# Patient Record
Sex: FEMALE | Race: WHITE
[De-identification: names, ages, dates, MRNs, and addresses within clinical notes are randomized per-mention and may not be internally consistent; named-entity substitution may affect disease eponyms.]

---

## 2023-11-07 ENCOUNTER — HOSPITAL ENCOUNTER
Dept: HOSPITAL 101 - LAB | Age: 63
Discharge: HOME | End: 2023-11-07
Payer: MEDICARE

## 2023-11-07 DIAGNOSIS — E03.9: Primary | ICD-10-CM

## 2023-11-07 DIAGNOSIS — I50.22: ICD-10-CM

## 2023-11-07 DIAGNOSIS — I11.0: ICD-10-CM

## 2023-11-07 DIAGNOSIS — R53.82: ICD-10-CM

## 2023-11-07 DIAGNOSIS — Z51.81: ICD-10-CM

## 2023-11-07 LAB
ADD MANUAL DIFF: NO
ALANINE AMINOTRANSFERASE: 17 U/L (ref 14–59)
ALBUMIN GLOBULIN RATIO: 0.9
ALBUMIN LEVEL: 3.4 G/DL (ref 3.4–5)
ALKALINE PHOSPHATASE: 100 U/L (ref 46–116)
ANION GAP: 11.7
ASPARTATE AMINO TRANSFERASE: 53 U/L (ref 15–37)
BLOOD UREA NITROGEN: 10 MG/DL (ref 7–18)
CALCIUM: 9.2 MG/DL (ref 8.5–10.1)
CARBON DIOXIDE: 32.5 MMOL/L (ref 21–32)
CHLORIDE: 98 MMOL/L (ref 98–107)
CO2 BLD-SCNC: 32.5 MMOL/L (ref 21–32)
ESTIMATED GFR (AFRICAN AMERICA: >60 (ref 60–?)
ESTIMATED GFR (NON-AFRICAN AME: >60 (ref 60–?)
GLOBULIN: 4 G/DL
GLUCOSE BLD-MCNC: 80 MG/DL (ref 74–106)
HCT VFR BLD CALC: 39.9 % (ref 36–48)
HEMATOCRIT: 39.9 % (ref 36–48)
HEMOGLOBIN: 12.9 G/DL (ref 12–16)
IMMATURE GRANULOCYTES ABS AUTO: 0.01 10^3/UL (ref 0–0.03)
IMMATURE GRANULOCYTES PCT AUTO: 0.2 % (ref 0–0.5)
LYMPHOCYTES  ABSOLUTE AUTO: 1.9 10^3/UL (ref 1.2–3.8)
MCV RBC: 92.8 FL (ref 81–99)
MEAN CORPUSCULAR HEMOGLOBIN: 30 PG (ref 26.7–34)
MEAN CORPUSCULAR HGB CONC: 32.3 G/DL (ref 29.9–35.2)
MEAN CORPUSCULAR VOLUME: 92.8 FL (ref 81–99)
PLATELET # BLD: 263 10^3/UL (ref 150–450)
PLATELET COUNT: 263 10^3/UL (ref 150–450)
POTASSIUM SERPLBLD-SCNC: 3.2 MMOL/L (ref 3.5–5.1)
POTASSIUM: 3.2 MMOL/L (ref 3.5–5.1)
RED BLOOD COUNT: 4.3 10^6/UL (ref 4.2–5.4)
SODIUM BLD-SCNC: 139 MMOL/L (ref 136–145)
SODIUM: 139 MMOL/L (ref 136–145)
TOTAL PROTEIN: 7.4 G/DL (ref 6.4–8.2)
VITAMIN B12: 294 PG/ML (ref 193–986)
WBC # BLD: 5.5 10^3/UL (ref 4–11)
WHITE BLOOD COUNT: 5.5 10^3/UL (ref 4–11)

## 2023-11-07 PROCEDURE — 82607 VITAMIN B-12: CPT

## 2023-11-07 PROCEDURE — 36415 COLL VENOUS BLD VENIPUNCTURE: CPT

## 2023-11-07 PROCEDURE — 85025 COMPLETE CBC W/AUTO DIFF WBC: CPT

## 2023-11-07 PROCEDURE — 80053 COMPREHEN METABOLIC PANEL: CPT

## 2023-11-07 PROCEDURE — 82306 VITAMIN D 25 HYDROXY: CPT

## 2023-11-07 PROCEDURE — 84439 ASSAY OF FREE THYROXINE: CPT

## 2024-02-07 ENCOUNTER — HOSPITAL ENCOUNTER
Dept: HOSPITAL 101 - LAB | Age: 64
Discharge: HOME | End: 2024-02-07
Payer: MEDICARE

## 2024-02-07 DIAGNOSIS — I48.11: Primary | ICD-10-CM

## 2024-02-07 LAB — MAGNESIUM: 1.7 MG/DL (ref 1.8–2.4)

## 2024-02-07 PROCEDURE — 36415 COLL VENOUS BLD VENIPUNCTURE: CPT

## 2024-02-07 PROCEDURE — 83735 ASSAY OF MAGNESIUM: CPT

## 2024-02-07 NOTE — XMS_ITS
Comprehensive CCD (C-CDA v2.1)  
  
                          Created on: 2024  
  
  
Giacomo Benavides  
External Reference #: CDR,PersonID:[id number]  
: 1960  
Sex: Female  
  
Demographics  
  
  
                                        Address             [address] [place]  [address]  
   
                                        Home Phone          [phone]  
   
                                        Home Phone          [phone]  
   
                                        Home Phone          [phone]  
   
                                        Home Phone          [phone]  
   
                                        Work Phone          [phone]  
   
                                        Preferred Language  en  
   
                                        Marital Status        
   
                                        Mandaen Affiliation Unknown  
   
                                        Race                White  
   
                                        Ethnic Group        Not  or Lati  
no  
  
  
Author  
  
  
                                        Name                Unknown  
   
                                        Address             [address]  
[address]  
[place]  [address]  
   
                                        Phone               [phone]  
   
                                        Organization        [facility]  
  
  
Care Team Providers  
  
  
                                Care Team Member Name Role            Phone  
   
                                Cesario Piedra Unavailable     [phone]  
   
                                Unavailable     Unavailable     [phone]  
   
                                DorotheaJe bettencourtew Unavailable     [phone]  
   
                                Dorothea, Cesario Unavailable     [phone]  
   
                                JESSE Wallace Attending Provider [phone]  
   
                                NO FAMILY, PHYSICIAN Primary Care Provider Unava  
MD Saeed Etienne Attending Provider [phone]  
0  
   
                                Pierce Wallace  Unavailable     [phone]  
   
                                DERREKC, DR DONALDSON Admitting       Unavailable  
   
                                DOROTHEA, CESARIO Primary Care    Unavailable  
   
                                MISC, DR DONALDSON Attending       Unavailable  
   
                                MISC, DR DONALDSON Consulting      Unavailable  
   
                                DOROTHEA, CESARIO Admitting       Unavailable  
   
                                DOROTHEA, CESARIO Primary Care    Unavailable  
   
                                DOROTHEA, CESARIO Attending       Unavailable  
   
                                DOROTHEA, CESARIO Consulting      Unavailable  
   
                                JEMAL, DR NASSAR Admitting       Unavailable  
   
                                AGUSTIN, DR ROGELIO VEE Consulting      Unavailable  
   
                                DOROTHEA, CESARIO Primary Care    Unavailable  
   
                                JEMAL, DR NASSAR Attending       Unavailable  
   
                                SALVADOR HUANG        Consulting      Unavailable  
   
                                JEMAL, DR NASSAR Consulting      Unavailable  
   
                                SHAIKH BEE H Admitting       Unavailable  
   
                                DOROTHEA, CESARIO Primary Care    Unavailable  
   
                                SHAIKH BEE H Attending       Unavailable  
   
                                DOROTHEA, CESARIO Attending       Unavailable  
   
                                DOROTHEA, CESARIO Consulting      Unavailable  
   
                                DOROTHEA, CESARIO Admitting       Unavailable  
   
                                DOROTHEA, CESARIO Attending       Unavailable  
   
                                DOROTHEA, CESARIO Consulting      Unavailable  
   
                                DOROTHEA, CESARIO Primary Care    Unavailable  
   
                                DOROTHEA, CESARIO Admitting       Unavailable  
   
                                NO FAMILY, PHYSICIAN Primary Care Provider Unava  
MD Laverne Watsonbuck Attending Provider [phone]  
   
                                DO Cesario Piedra Primary Care Provider [phone]  
   
                                DO Cesario Piedra Attending Provider [phone]  
   
                                NO FAMILY, PHYSICIAN Primary Care    Unavailable  
   
                                Cesario Joaquin Admitting       Unavailable  
   
                                JoaquinCesario hoover Attending       Unavailable  
   
                                Cesario Piedra KATHY Admitting       Unavailable  
   
                                DorotheaCesario bettencourt KATHY Primary Care    Unavailable  
   
                                Dorothea Cesario CHAVEZ Attending       Unavailable  
   
                                NO FAMILY, PHYSICIAN Primary Care    Unavailable  
   
                                Redd So Admitting       Unavailab  
le  
   
                                Redd So Attending       Unavailab  
le  
  
  
  
Allergies  
  
  
                                                    Allergy   
Classification                          Reported   
Allergen(s)                             Allergy   
Type                                    Date of   
Onset                     Reaction(s)               Facility  
   
                                                      
(20 sources)                            Prochlorperazine;   
Translations:   
[Compazine]                             Drug   
Allergy                                 neurological sx     [facility]   
[facility]   
[facility]  
Other Phone:   
[phone]  
   
                                                      
(1 source)                Prochlorperazine          Drug   
Allergy                                 20  
13                                                  [facility]   
Repository  
   
                                                      
(1 source)                Prochlorperazine          Drug   
Allergy                                 06-10-20  
20                                                  [facility]   
Repository  
  
  
  
Medications  
Current Medications  
  
  
  
                      Medication Drug Class(es) Dates      Sig (Normalized) Sig   
(Original)  
   
                                                    [id number]   
actuat albuterol   
0.09 mg/actuat   
metered dose   
inhaler  
(20 sources)                            beta2-Adrenergic   
Agonist                                 Start:   
05-                              take 1 puff(s) by   
inhalation every   
four hours as needed                    Albuterol Sulfate   
 (90 Base)   
MCG/ACT 1 puff as   
needed Inhalation   
every 4 hrs for   
30 day(s) 10 May,   
2021 Active  
  
  
  
                                        Start: 05-   take 1 puff(s) by in  
halation   
every four hours as needed              Albuterol Sulfate  (90   
Base) MCG/ACT 1 puff as needed   
Inhalation every 4 hrs for 30   
day(s) 10 May, 2021 Active  
   
                                Start: 05-                   
   
                                Start: 2019                 Albuterol Sulf  
ate (2.5 MG/3ML)   
0.083% Inhalation Nebulization   
Solution USE AS DIRECTED.   
Quantity: 0 Refills: 0 Ordered:   
4-Dec-2019 DO Start : 4-Dec-2019   
Active  
   
                                                    Start: 2019  
End: 2022                         take 2.5 mg by inhalation four   
times daily                             Albuterol Sulfate Discontinued 2.5   
MG INHALATION Four times daily -   
Respiratory 360    
12:00am 2022 4:48am  
  
  
  
                                                    Albuterol Sulfate (2.5 MG/ 3  
 ML) 2.5   
MG/3ML 0.083% Nebulization Solution  
(9 sources)                                                     Albuterol Sulfat  
e (2.5 MG/ 3 ML) 2.5   
MG/3ML 0.083% Nebulization Solution 3ml   
Inhalation 4 times a day Active  
  
  
  
                                                                  
  
  
  
                                                    apixaban 5 mg oral   
tablet  
(8 sources)         Factor Xa Inhibitor                     take 1 tablet by   
mouth every   
twelve hours                            Eliquis 5 MG 1   
tablet Orally   
Twice a day   
Active  
   
                                                    aspirin 81 mg   
delayed release   
oral tablet  
(20 sources)                            Platelet Aggregation   
Inhibitor, Nonsteroidal   
Anti-inflammatory Drug                  Start:   
2022                              take 81 mg by   
mouth once daily                        Aspirin Active 81   
MG PO Daily 90    
12:00am  
  
  
  
                                                    Start: 2019  
End: 2020           take 81 mg by mouth once daily Aspirin Discontinued 81  
 MG PO   
Daily   
2019 1:00am 2020 11:58am  
   
                                                            take 1 tablet by kathrin  
th once   
daily                                   Aspirin 81 MG 1 tablet Orally Once a   
day Active  
  
  
  
                                                    atorvastatin 80 mg   
oral tablet  
(20 sources)                            HMG-CoA   
Reductase   
Inhibitor                 Start: 2021         take 1 tablet   
by mouth once   
daily                                   Atorvastatin   
(Lipitor) 80 mg   
Tablet Active 80 MG   
PO Daily 2022 12:00am  
  
  
  
                                                    Start: 2019  
End: 2020                         take 40 mg by mouth once daily   
at bedtime                              Atorvastatin Discontinued 40 MG PO   
Daily at bedtime  1:00am 2020   
3:48pm  
   
                                                    Start: 2019  
End: 06-           take 10 mg by mouth once daily Atorvastatin Discontinu  
ed 10 MG   
PO   
Daily 2019 1:00am   
2019 5:45pm  
  
  
  
                                                    carvedilol 3.125 mg   
oral tablet  
(11 sources)                            alpha-Adrenergic   
Blocker,   
beta-Adrenergic   
Blocker                                 Start:   
2022                              take 3.125 mg   
by mouth   
twice daily   
at mealtime                             Carvedilol   
Active 3.125 MG   
PO Twice daily   
with meals 60   
 12:00am  
   
                                                    empagliflozin 10 mg   
oral tablet  
(11 sources)                            Sodium-Glucose   
Cotransporter 2   
Inhibitor                               Start:   
2022                              take 1 tablet   
by mouth   
every   
twenty-four   
hours                                   Jardiance 10 MG   
1 tablet Orally   
Once a day for   
90 day(s)    
Active  
   
                                                    furosemide 40 mg   
oral tablet  
(20 sources)              Loop Diuretic             Start:   
2022                              take 40 mg by   
mouth once   
daily                                   Furosemide   
Active 40 MG PO   
Daily at 0800   
30    
12:00am  
  
  
  
                                        Start: 2020   take 0.5 tablet by m  
outh once   
daily                                   Furosemide 20 MG 1/2 tablet Orally   
daily for 90 days    
Active  
   
                                        Start: 2020   take 1 tablet by kathrin  
th every   
twenty-four hours                       Furosemide 20 MG 1 tablet Orally   
daily for 90 days    
Active  
   
                                                    Start: 2020  
End: 2022           take 40 mg by mouth once daily Furosemide Discontinued  
 40 MG PO   
Daily 2020 12:01pm 2022 4:48am  
  
  
  
                                                    levothyroxine sodium   
0.088 mg oral tablet  
(20 sources)        l-Thyroxine         Start: 2019   take 1 tablet   
by mouth once   
daily                                   Levothyroxine   
(Synthroid) 88 mcg   
Tablet Active 88 MCG   
PO DAILY@0630 30    
12:00am  
  
  
  
                                                    Start: 2019  
End: 2019                         take 100 ug by mouth once   
daily                                   Levothyroxine Discontinued 100 MCG PO   
Daily 2019 1:00am 2019 2:12pm  
   
                                                                Levothyroxine So  
dium 88 MCG TAKE 1   
TABLET BY MOUTH EVERY DAY FOR 90 DAYS   
for 90 Active  
  
  
  
                                                    magnesium oxide 400 mg   
oral tablet  
(14 sources)                            Start: 2022   take 400 mg by   
mouth twice daily                       Magnesium Oxide Active   
400 MG PO Twice daily   
60    
12:00am  
  
  
  
                                                                Magnesium Oxide   
Active  
  
  
  
                                                    nitrofurantoin,   
macrocrystals 25 mg /   
nitrofurantoin,   
monohydrate 75 mg   
oral capsule  
(2 sources)                             Nitrofuran   
Antibacterial                           Start:   
2022                              take 1   
capsule by   
mouth every   
twelve hours                            Macrobid 100 MG 1   
capsule with food   
Orally every 12   
hrs for 7 day(s)   
28 Dec, 2022   
Active  
   
                                                    ondansetron 8 mg oral   
tablet  
(1 source)                              Serotonin-3   
Receptor Antagonist                                 take 1   
tablet by   
mouth every   
twenty-four   
hours                                   Ondansetron HCl 8   
MG 1 tablet as   
needed Orally   
Once a day for 30   
days Active  
   
                                                    pantoprazole 40 mg   
delayed release oral   
tablet  
(20 sources)                            Proton Pump   
Inhibitor                               Start:   
2019                              take 40 mg   
by mouth   
once daily                              Pantoprazole   
Active 40 MG PO   
Daily 2020 12:00am  
   
                                                    sacubitril 24 mg /   
valsartan 26 mg oral   
tablet  
(20 sources)                            Angiotensin 2   
Receptor Blocker                        Start:   
2022                              take 2   
tablets by   
mouth twice   
daily                                   Sacubitril-Valsar  
dorado (Entresto)   
24-26 mg Tablet   
Active 2 TAB PO   
Twice daily 120   
30 2022 12:00am  
  
  
  
                                                                ENTRESTO 24 mg/2  
6 mg 1 orally twice a day Active  
  
  
  
Completed/Discontinued Medications  
  
  
  
                      Medication Drug Class(es) Dates      Sig (Normalized) Sig   
(Original)  
   
                                                    alendronic acid 70   
mg oral tablet  
(5 sources)               Bisphosphonate            Start:   
2019  
End:   
2020                              take 70 mg by mouth   
every week                              Alendronate   
Discontinued 70 MG   
PO every week   
2019   
1:00am 2020 12:57pm  
   
                                                    ALPRAZolam 1 mg   
oral tablet  
(20 sources)              Benzodiazepine            Start:   
2019  
End:   
2020                              take 1 mg by mouth   
three times daily                       Alprazolam   
Discontinued 1 MG   
PO Three times   
daily 0 2019 2:09pm 2020 4:18pm  
  
  
  
                                                            take 1 tablet by kathrin  
th every twelve   
hours                                   ALPRAZolam 1 MG 1 tablet Orally Twice a   
day   
Active  
   
                                                            take 1 tablet by kathrin  
th three times daily   
as needed                               ALPRAZolam ER 1 MG Oral Tablet Extended   
Release 24 Hour 1 tablet three times daily   
as needed Quantity: 0 Refills: 0 Ordered:   
10-Feb-2022 DO Active  
  
  
  
                                                    amiodarone   
hydrochloride 200 mg   
oral tablet  
(14 sources)        Antiarrhythmic      Start: 2022   take 1 tablet   
by mouth once   
daily                                   Amiodarone HCl -   
200 MG Oral Tablet   
TAKE 1 TABLET   
DAILY. Quantity:   
90 Refills: 1   
Ordered:   
2022   
Tomi Rodgers MD   
Start :   
2022 Active   
new start  
  
  
  
                                                    Start: 03-  
End: 2020                         take 200 mg by mouth once   
daily                                   Amiodarone Discontinued 200 MG PO   
Daily 2020 12:00am 2020 3:48pm  
  
  
  
                                                    atenolol 25 mg oral   
tablet  
(2 sources)                             beta-Adrenergic   
Blocker                                 Start:   
2019  
End: 2019                         take 25 mg   
by mouth   
once daily                              Atenolol   
Discontinued 25 MG   
PO Daily 2019 1:00am   
2019 10:02am  
   
                                                    azithromycin 250 mg   
oral tablet  
(2 sources)                             Macrolide   
Antimicrobial                           Start:   
2019  
End: 03-                         take 250 mg   
by mouth   
every   
twenty-four   
hours                                   Azithromycin   
Discontinued 250   
MG PO Q24H 3 3   
2019   
12:00am 2020 4:04pm  
   
                                                    busPIRone   
hydrochloride 15 mg   
oral tablet  
(12 sources)                                        Start:   
03-  
End: 2022                         take 30 mg   
by mouth   
twice daily                             Buspirone   
Discontinued 30 MG   
PO Twice daily   
2020   
4:03pm 2022 2:17pm  
  
  
  
                                                    Start: 2019  
End: 03-                         take 15 mg by mouth twice   
daily                                   Buspirone Discontinued 15 MG PO Twice   
daily 60 2019 12:00am   
2020 4:04pm  
  
  
  
                                                    clopidogrel 75 mg   
oral tablet  
(12 sources)                            P2Y12 Platelet   
Inhibitor                               Start: 2019  
End: 2022                         take 75 mg by   
mouth once   
daily                                   Clopidogrel   
Discontinued 75 MG   
PO Daily  12:00am   
2022   
5:08am  
   
                                                    24 hr dilTIAZem   
hydrochloride 180 mg   
extended release   
oral capsule  
(5 sources)                             Calcium Channel   
Blocker                   Start: 2019         take 1 capsule   
by mouth once   
daily in the   
morning                                 dilTIAZem HCl ER   
Beads 180 MG Oral   
Capsule Extended   
Release 24 Hour   
TAKE 1 CAPSULE   
DAILY IN THE   
MORNING. Quantity:   
0 Refills: 0   
Ordered:   
4-Dec-2019 DO   
Start : 4-Dec-2019   
Active  
  
  
  
                                                    Start: 2019  
End: 03-                         take 120 mg by mouth once   
daily                                   Diltiazem Hcl Discontinued 120 MG PO   
Daily    
1:00am 2020 4:04pm  
  
  
  
                                                    ergocalciferol 1.25   
mg oral capsule  
(6 sources)                             Provitamin D2   
Compound                                Start:   
2019                              take 1   
capsule by   
mouth every   
week                                    Ergocalciferol 1.25   
MG (85589 UT) Oral   
Capsule TAKE 1   
CAPSULE WEEKLY.   
Quantity: 0   
Refills: 0 Ordered:   
4-Dec-2019 DO Start   
: 4-Dec-2019 Active  
   
                                                    ferrous sulfate 325   
mg oral tablet  
(2 sources)                                         Start:   
2020  
End: 2022                         take 325 mg   
by mouth   
once daily                              Ferrous Sulfate   
Discontinued 325 MG   
PO Daily 2020 12:00am   
2022   
5:08am  
   
                                                    folic acid 1 mg oral   
tablet  
(9 sources)                                         Start:   
2019  
End: 03-                         take 1 mg by   
mouth once   
daily                                   Folic Acid   
Discontinued 1 MG   
PO Daily 2019 1:00am   
2020   
4:04pm  
   
                                                    hydrOXYzine pamoate   
50 mg oral capsule  
(2 sources)               Antihistamine             Start:   
2019  
End: 03-                         take 50 mg   
by mouth   
three times   
daily                                   Hydroxyzine Pamoate   
Discontinued 50 MG   
PO Three times   
daily    
1:00am 2020 4:04pm  
   
                                                    lamoTRIgine 150 mg   
oral tablet  
(13 sources)                            Mood Stabilizer,   
Anti-epileptic   
Agent                                   Start:   
2022  
End: 2022                         take 1   
tablet by   
mouth once   
daily                                   Lamotrigine   
(Lamictal) 150 mg   
Tablet Discontinued   
150 MG PO Daily   
2022   
12:00am 2022 2:17pm  
  
  
  
                                                    Start: 2020  
End: 2020                         take 25 mg by mouth once daily   
at bedtime                              Lamotrigine Discontinued 25 MG PO   
Daily at bedtime  12:00am 2020 12:01pm  
   
                                                    Start: 2020  
End: 2020                         take 150 mg by mouth once daily   
in the morning                          Lamotrigine Discontinued 150 MG PO   
Every morning 2020   
12:00am 2020 4:21pm  
   
                                                    Start: 2020  
End: 2020                         take 75 mg by mouth once daily   
at bedtime                              Lamotrigine Discontinued 75 MG PO   
Daily at bedtime 2020   
12:00am 2020 4:32pm  
   
                                                            take 1 tablet by kathrin  
th once   
daily                                   LaMICtal XR 50 MG Oral Tablet   
Extended Release 24 Hour Take 1   
tablet daily Quantity: 0 Refills: 0   
Ordered: 10-Feb-2022 DO Active  
  
  
  
                                                    lisinopril 5 mg   
oral tablet  
(16 sources)                            Angiotensin   
Converting Enzyme   
Inhibitor                 Start: 02-         take 1 tablet   
by mouth once   
daily                                   Lisinopril 5 MG   
Oral Tablet TAKE 1   
TABLET DAILY.   
Quantity: 90   
Refills: 3   
Ordered:   
10-Feb-2022   
Tomi Rodgers MD   
Start :   
10-Feb-2022 Active   
dose increased  
  
  
  
                                        Start: 2021   take 1 tablet by kathrin  
th once   
daily                                   Lisinopril 2.5 MG Oral Tablet TAKE 1   
TABLET BY MOUTH EVERY DAY Quantity:   
90 Refills: 3 Ordered: 30-Dec-2021   
Tomi Rodgers MD Start :   
30-Dec-2021 Active  
   
                                Start: 2020 take 5 mg by mouth once daily   
Lisinopril Active 5 MG PO Daily   
2020 12:00am  
   
                                        Start: 2019   take 1 tablet by kathrin  
th once   
daily                                   Lisinopril 5 MG Oral Tablet TAKE 1   
TABLET DAILY. Quantity: 90 Refills:   
0 Ordered: 4-Dec-2019 DO Start :   
4-Dec-2019 Active  
   
                                                    Start: 2019  
End: 2020           take 5 mg by mouth once daily Lisinopril Discontinued   
5 MG PO   
Daily 2019 1:00am   
2020 11:59am  
   
                                                            take 0.5 tablet by m  
out once   
daily                                   Lisinopril 5 MG 1/2 tablet Orally   
Once a day for 30 days Active  
  
  
  
                                                    LORazepam 0.5 mg   
oral tablet  
(3 sources)               Benzodiazepine            Start:   
2019                              take 1 tablet   
by mouth   
three times   
daily as   
needed                                  LORazepam 0.5 MG   
Oral Tablet TAKE 1   
TABLET 3 TIMES   
DAILY AS NEEDED.   
Quantity: 0   
Refills: 0   
Ordered:   
4-Dec-2019 DO   
Start : 4-Dec-2019   
Active  
   
                                                    methotrexate 2.5   
mg oral tablet  
(9 sources)                             Folate Analog   
Metabolic Inhibitor                     Start:   
2019                              take 8   
tablets by   
mouth every   
week                                    Methotrexate   
Sodium 2.5 MG Oral   
Tablet TAKE 8   
TABLETS PER WEEK.   
Quantity: 0   
Refills: 0   
Ordered:   
4-Dec-2019 DO   
Start : 4-Dec-2019   
Active  
  
  
  
                                                    Start: 2019  
End: 2020                                     Methotrexate Discontinued 0   
.ROUTE .COMPLEX 2019  
   
1:00am 2020 1:00pm 8 2.5 MG TAB ONCE WEEKLY on   
   
                                                                Methotrexate Sod  
ium Active  
  
  
  
                                                    metoprolol   
tartrate 25 mg   
oral tablet  
(10 sources)                            beta-Adrenergic   
Blocker                                 Start: 2020  
End: 2020                         take 25 mg   
by mouth   
twice daily                             Metoprolol   
Tartrate   
Discontinued 25 MG   
PO Twice daily 60   
30 2020 12:00am 2020 12:00pm  
  
  
  
                                                take 0.5 tablet by mouth once da  
marco a Metoprolol Succinate ER 25 MG 1/2 tablet   
Orally Once a day Active  
   
                                                            take 1 tablet by kathrin  
th every twenty-four   
hours                                   Metoprolol Succinate ER 25 MG 1 tablet   
Orally Once a day Active  
  
  
  
                                                    Nebulizer Accessories  
(2 sources)                                         Start: 2019  
End: 2022                                     Nebulizer Accessories Discon  
tinued 0   
.ROUTE .MEDSUPPLY    
12:00am 2022 4:49am As   
directed  
  
  
  
                                                    Start: 2019  
End: 2022                                     Nebulizer Accessories Discon  
tinued 0 .ROUTE .MEDSUPPLY  11:00pm 2022 3:49am As directed  
  
  
  
                                                    nitroglycerin 0.4 mg   
sublingual tablet  
(19 sources)              Nitrate Vasodilator       Start: 2019  
End: 2022                                     Nitroglycerin   
Discontinued 0.4 MG   
SUBLINGUAL every 5 to   
15 minutes 2020 12:00am 2022 5:09am  
  
  
  
                                                    Start: 2019  
End: 2019                                     Nitroglycerin Discontinued 0  
.4 MG SUBLINGUAL every 5 to 15   
minutes  1:00am 2019 12:02am   
until response; do not exceed 3 doses per episode  
  
  
  
                                                    omeprazole 40 mg   
oral tablet  
(4 sources)                             Proton Pump   
Inhibitor                               Start: 2019  
End: 03-                         take 40 mg by   
mouth once   
daily at   
bedtime                                 Omeprazole   
Discontinued 40 MG   
PO Daily at bedtime   
 1:00am   
2020   
4:04pm  
  
  
  
                                                    Start: 2019  
End: 2019                         take 40 mg by mouth twice   
daily                                   Omeprazole Discontinued 40 MG PO   
Twice daily 2019   
1:00am 2019 10:04am  
  
  
  
                                                    potassium chloride 20   
meq extended release   
oral tablet  
(20 sources)                            Start: 2022   take 2 tablets by   
mouth once daily                        Potassium Chloride Modesta   
ER 20 MEQ Oral Tablet   
Extended Release TAKE 2   
TABLET Daily Quantity:   
0 Refills: 0 Ordered:   
2022 DO Start :   
2022 Active  
  
  
  
                                Start: 2022                 Potassium Chlo  
ride (Klor-Con M20) 20   
mEq Tablet,Er Particles/Crystals Active   
40 MEQ PO Daily 60    
12:00am  
   
                                        Start: 2022   take 1 tablet by kathrin  
th three   
times daily                             Potassium Chloride ER 10 MEQ Oral   
Tablet Extended Release Take 1 tablet   
by mouth three times a day Quantity:   
270 Refills: 3 Ordered: 16-Mar-2022   
Tomi Rodgers MD Start : 16-Mar-2022   
Active  
   
                                                    Start: 2020  
End: 2022                                     Potassium Chloride (Klor-Con  
 M20) 20   
mEq tablet,ER particles/crystals   
Discontinued 20 MEQ PO Daily 2020 12:01pm 2022 5:08am  
   
                                        Start: 2020   take 1 tablet by kathrin  
th every   
twenty-four hours                       Potassium Chloride ER 10 MEQ 1 tablet   
with food Orally Once a day for 30 days   
26 Mar, 2020 Active  
   
                                        Start: 2020   take 1 tablet by kathrin  
th every   
eight hours                             Potassium Chloride ER 10 MEQ 1 tablet   
with food Orally tid for 30 day(s) 26   
Mar, 2020 Active  
   
                                                    Start: 2020  
End: 2020                                     Potassium Chloride (Klor-Con  
 M20) 20   
mEq Tablet,Er Particles/Crystals   
Discontinued 40 MEQ PO Daily 60  12:00am 2020   
12:01pm  
  
  
  
                                                    predniSONE 5 mg oral   
tablet  
(7 sources)                             Start: 2019   take 0.5 tablet by   
mouth once daily                        predniSONE 5 MG Oral   
Tablet TAKE 0.5 TABLET   
Daily Quantity: 0   
Refills: 0 Ordered:   
4-Dec-2019 DO Start :   
4-Dec-2019 Active  
  
  
  
                                                    Start: 2019  
End: 2022           take 2.5 mg by mouth once daily Prednisone Discontinue  
d 2.5 MG   
PO   
Daily 2019 1:00am   
2022 4:41am  
   
                                                            take 1 tablet by kathrin  
th every   
twenty-four hours                       prednisone 10 MG 1 Tablet Orally   
Once a day Active  
  
  
  
                                                    rOPINIRole 0.5 mg   
oral tablet  
(4 sources)                             Nonergot   
Dopamine Agonist                        Start: 2019  
End: 03-                         take 0.5 mg by   
mouth once   
daily at   
bedtime                                 Ropinirole   
Discontinued 0.5 MG   
PO Daily at bedtime   
   
12:00am 2020 4:04pm  
  
  
  
                                                    Start: 2019  
End: 06-                         take 0.5 mg by mouth once daily   
at bedtime                              Ropinirole Discontinued 0.5 MG PO   
Daily at bedtime 2019   
1:00am 2019 12:20pm  
  
  
  
                                                    sertraline 100 mg   
oral tablet  
(5 sources)                             Serotonin   
Reuptake   
Inhibitor                               Start: 2022  
End: 2022                         take 1 tablet   
by mouth once   
daily                                   Sertraline (Zoloft)   
100 mg Tablet   
Discontinued 100 MG   
PO Daily 2022 12:00am   
2022   
2:17pm  
  
  
  
                                                                Zoloft 50 MG Ora  
l Tablet TAKE 1 AND 1/2 TABLETS DAILY. Quantity: 0 Refills: 0   
Ordered: 10-Feb-2022 DO Active  
  
  
  
                                                    sotalol hydrochloride   
120 mg oral tablet  
(7 sources)         Antiarrhythmic      Start: 2022   take 0.5   
tablet by   
mouth twice   
daily                                   Sotalol HCl - 120   
MG Oral Tablet   
TAKE 1/2 TABLET   
TWICE A DAY   
Quantity: 90   
Refills: 3   
Ordered:   
2022   
Tomi Rodgers MD   
Start :   
2022 Active  
  
  
  
                                                    Start: 2020  
End: 2022                                     Sotalol (Sotalol Af) 120 mg   
Tablet   
Discontinued 60 MG PO Twice daily  12:00am 2022 2:17pm  
   
                                                            take 0.5 tablet by m  
outh every   
twelve hours                            Sotalol HCl 120 MG 1/2 tablet Orally   
every 12 hrs Active  
   
                                                                  
  
  
  
                                                    spironolactone 25 mg   
oral tablet  
(17 sources)                            Aldosterone   
Antagonist                              Start:   
2022                              take 0.5   
tablet by   
mouth once   
daily                                   Spironolactone 25 MG   
Oral Tablet TAKE 1/2   
TABLET BY MOUTH   
EVERYDAY Quantity:   
45 Refills: 3   
Ordered: 2022   
Tomi Rodgers MD   
Start : 2022   
Active  
  
  
  
                                        Start: 2022   take 12.5 mg by mout  
h once   
daily                                   Spironolactone Active 12.5 MG PO Daily   
15 30 April 7th, 2022 12:00am  
  
  
  
                                                    ticagrelor 90 mg   
oral tablet  
(2 sources)                                         Start: 2019  
End: 06-                         take 1 tablet   
by mouth twice   
daily                                   Ticagrelor   
(Brilinta) 90 mg   
Tablet Discontinued   
90 MG PO Twice   
daily 180 90   
2019   
1:00am 2019 11:44am  
   
                                                    tiZANidine 2 mg   
oral capsule  
(2 sources)                             Central alpha-2   
Adrenergic   
Agonist                                             take 1 capsule   
by mouth every   
twenty-four   
hours                                   Zanaflex 2 MG 1   
capsule as needed   
Orally once a day   
Not-Taking  
   
                                                    warfarin sodium 5   
mg oral tablet  
(20 sources)                            Vitamin K   
Antagonist                Start: 2019         take 1 tablet   
by mouth once   
daily                                   Warfarin Sodium 5   
MG Oral Tablet TAKE   
1 TABLET DAILY AS   
DIRECTED by   
[facility]   
[facility]   
Quantity: 0   
Refills: 0 Ordered:   
4-Dec-2019 DO Start   
: 4-Dec-2019 Active  
  
  
  
                                Start: 06-                 Warfarin (Coum  
claudette) 2 mg tablet   
Active 1 MG PO As Directed 2019 12:00am as directed by   
[facility]  
   
                                                    Start: 2019  
End: 06-                         take 1 tablet by mouth once   
daily                                   Warfarin (Coumadin) 5 mg Tablet   
Discontinued 5 MG PO Daily at 1700   
30 30 2019 1:00am   
2019 5:48pm  
  
  
  
Problems  
Active Problems  
  
  
                      Problem Classification Problem    Date       Documented Da  
te Episodic/Chronic  
   
                                                    Abdominal pain  
(20 sources)                            Left sided abdominal   
pain; Translations:   
[Unspecified abdominal   
pain]                                   Onset:   
  
3                         2019                Episodic  
   
                                                    Acute and unspecified   
renal failure  
(2 sources)                             Injury of kidney;   
Translations: [Acute   
kidney failure,   
unspecified]                            2022          Episodic  
   
                                                    Anxiety disorders  
(16 sources)                            Anxiety; Translations:   
[Anxiety disorder,   
unspecified]                            2019          Chronic  
   
                                                    Cardiac dysrhythmias  
(20 sources)                            Paroxysmal atrial   
fibrillation;   
Translations: [Atrial   
fibrillation]                           Onset:   
11-  
1  
Resolved:   
  
2                                                   Chronic  
   
                                        Comment on above:   Status post radiofre  
quency ablation at ;   
   
                                                    Chronic obstructive   
pulmonary disease and   
bronchiectasis  
(14 sources)                            Chronic obstructive   
lung disease;   
Translations: [Chronic   
obstructive pulmonary   
disease, unspecified]                                         Chronic  
   
                                                    Complications of   
surgical procedures or   
medical care  
(1 source)                              Hypotension due to   
drugs                                                       Episodic  
   
                                                    Congestive heart   
failure;   
nonhypertensive  
(20 sources)                            Acute on chronic   
systolic heart   
failure; Translations:   
[Acute on chronic   
systolic (congestive)   
heart failure]                          Onset:   
  
2  
Resolved:   
  
2                                                   Chronic  
   
                                                    Coronary   
atherosclerosis and   
other heart disease  
(20 sources)                            Coronary   
atherosclerosis;   
Translations:   
[Coronary   
atherosclerosis of   
native coronary   
artery]                                 2019          Chronic  
   
                                                    Coronary   
atherosclerosis and   
other heart disease  
(12 sources)                            Patient post   
percutaneous   
transluminal coronary   
angioplasty;   
Translations:   
[Percutaneous   
transluminal coronary   
angioplasty status]                                         Episodic  
   
                                        Comment on above:   LAD 2019;   
   
                                                    Deficiency and other   
anemia  
(13 sources)                            Iron deficiency   
anemia; Translations:   
[Iron deficiency   
anemia, unspecified]                                         Episodic  
   
                                                    Disorders of lipid   
metabolism  
(14 sources)                            Dyslipidemia;   
Translations: [Other   
and unspecified   
hyperlipidemia]                         06-          Chronic  
   
                                                    Esophageal disorders  
(16 sources)                            Gastroesophageal   
reflux disease;   
Translations:   
[Gastro-esophageal   
reflux disease without   
esophagitis]                            Onset:   
  
3                                                   Chronic  
   
                                                    Essential hypertension  
(20 sources)                            Benign essential   
hypertension;   
Translations: [Benign   
essential   
hypertension]                           Onset:   
11-  
1  
Resolved:   
  
2                                                   Chronic  
   
                                                    Fluid and electrolyte   
disorders  
(6 sources)                             Metabolic acidosis;   
Translations:   
[Metabolic acidosis]                     2019          Episodic  
   
                                                    Genitourinary symptoms   
and ill-defined   
conditions  
(2 sources)     Dysuria                                         Episodic  
   
                                                    Hypertension with   
complications and   
secondary hypertension  
(1 source)                              Hypertensive heart   
disease with heart   
failure; Translations:   
[HTN HEART DISEASE   
W/HEART FAIL]                           Onset:   
  
3                                                   Chronic  
   
                                                    Malaise and fatigue  
(3 sources)                             Fatigue; Translations:   
[Chronic fatigue,   
unspecified]                                                Chronic  
   
                                                    Malaise and fatigue  
(2 sources)                             Asthenia;   
Translations:   
[Weakness]                              2020          Episodic  
   
                                                    Mood disorders  
(14 sources)                            Bipolar II disorder;   
Translations: [Bipolar   
II disorder]                            Onset:   
11-  
1  
Resolved:   
11-  
1                                                   Chronic  
   
                                                    Nausea and vomiting  
(12 sources)                            Nausea; Translations:   
[Nausea]                                                    Episodic  
   
                                                    Osteoarthritis  
(1 source)                              Unspecified   
osteoarthritis,   
unspecified site;   
Translations:   
[UNSPECIFIED   
OSTEOARTHRITIS UNS   
SITE]                                   Onset:   
  
3                                                   Chronic  
   
                                                    Osteoporosis  
(13 sources)                            Adult idiopathic   
generalized   
osteoporosis;   
Translations: [Other   
osteoporosis without   
current pathological   
fracture]                                                   Chronic  
   
                                                    Other aftercare  
(17 sources)                            Drug therapy finding;   
Translations:   
[Long-term (current)   
use of other   
medications]                                                Episodic  
   
                                                    Other aftercare  
(9 sources)                             Encounter for   
therapeutic drug level   
monitoring;   
Translations: [ENC   
THERAPEUTC DRUG LEVL   
MONITORING]                             Onset:   
11-  
1  
Resolved:   
11-  
1                                                   Episodic  
   
                                                    Other aftercare  
(2 sources)                             Polypharmacy ;   
Translations: [Other   
long term (current)   
drug therapy]                           2019          Episodic  
   
                                                    Other aftercare  
(1 source)                              Other long term   
(current) drug   
therapy; Translations:   
[OTH LONG TERM CURRENT   
DRUG THERAPY]                           Onset:   
  
3                                                   Episodic  
   
                                                    Other and ill-defined   
heart disease  
(7 sources)                             Systolic dysfunction;   
Translations: [Heart   
disease, unspecified]                                         Chronic  
   
                                                    Other and ill-defined   
heart disease  
(2 sources)                             Left ventricular   
systolic dysfunction;   
Translations: [Heart   
disease, unspecified]                     2022          Chronic  
   
                                                    Other circulatory   
disease  
(2 sources)                             Low blood pressure;   
Translations:   
[Hypotension,   
unspecified]                            2022          Episodic  
   
                                                    Other connective   
tissue disease  
(2 sources)                             Rhabdomyolysis;   
Translations:   
[Rhabdomyolysis]                        2019          Episodic  
   
                                                    Other gastrointestinal   
disorders  
(13 sources)                            Pharyngeal dysphagia;   
Translations:   
[Dysphagia, pharyngeal   
phase]                                                      Episodic  
   
                                                    Other hematologic   
conditions  
(4 sources)                             Raised cardiac enzyme   
or marker;   
Translations: [Other   
specified   
abnormalities of   
plasma proteins]                        06-          Episodic  
   
                                                    Other liver diseases  
(2 sources)                             Enzyme level -   
finding; Translations:   
[Elevated transaminase   
measurement]                            2020          Episodic  
   
                                                    Other liver diseases  
(2 sources)                             Cardiac enzymes   
abnormal;   
Translations:   
[Abnormal levels of   
other serum enzymes]                     2019          Episodic  
   
                                                    Other lower   
respiratory disease  
(2 sources)                             Hypoxia; Translations:   
[Hypoxemia]                             2022          Episodic  
   
                                                    Other nutritional;   
endocrine; and   
metabolic disorders  
(12 sources)                            Obesity; Translations:   
[Obesity, unspecified]                                         Chronic  
   
                                                    Other nutritional;   
endocrine; and   
metabolic disorders  
(13 sources)                            Obese class II;   
Translations: [Body   
mass index (BMI)   
38.0-38.9, adult]                                           Chronic  
   
                                                    Other nutritional;   
endocrine; and   
metabolic disorders  
(1 source)                              Body mass index (BMI)   
38.0-38.9, adult                                            Chronic  
   
                                                    Other screening for   
suspected conditions   
(not mental disorders   
or infectious disease)  
(20 sources)                            Prolonged QT interval;   
Translations:   
[Nonspecific abnormal   
electrocardiogram   
[ECG] [EKG]]                            Onset:   
11-  
1  
Resolved:   
11-  
1                                                   Episodic  
   
                                                    Pneumonia (except that   
caused by tuberculosis   
or sexually   
transmitted disease)  
(4 sources)                             Community acquired   
pneumonia;   
Translations:   
[Pneumonia,   
unspecified organism]                     06-          Episodic  
   
                                                    Residual codes;   
unclassified  
(4 sources)                             Tobacco user;   
Translations: [Tobacco   
use]                                    2022          Episodic  
   
                                                    Residual codes;   
unclassified  
(2 sources)                             Acute confusion;   
Translations:   
[Disorientation,   
unspecified]                            2019          Episodic  
   
                                                    Rheumatoid arthritis   
and related disease  
(20 sources)                            Rheumatoid arthritis -   
hand joint;   
Translations:   
[Rheumatoid arthritis,   
unspecified]                            Onset:   
  
2                                                   Chronic  
   
                                                    Screening and history   
of mental health and   
substance abuse codes  
(7 sources)                             Ex-smoker;   
Translations:   
[Personal history of   
tobacco use]                                                Episodic  
   
                                                    Septicemia (except in   
labor)  
(2 sources)                             Sepsis; Translations:   
[Sepsis, unspecified   
organism]                               2019          Episodic  
   
                                                    Substance-related   
disorders  
(19 sources)                            Smoker; Translations:   
[Tobacco use disorder]                  Onset:   
11-  
1  
Resolved:   
11-  
1                                                   Chronic  
   
                                                    Thyroid disorders  
(20 sources)                            Acquired   
hypothyroidism;   
Translations:   
[Hypothyroidism,   
unspecified]                            Onset:   
11-  
1  
Resolved:   
  
2                                                   Chronic  
   
                                                    Unclassified  
(1 source)                              Encounter for   
screening mammogram   
for malignant neoplasm   
of breast;   
Translations:   
[Encounter for   
screening mammogram   
for malignant neoplasm   
of breast]                              Onset:   
  
3                                                     
  
  
Past or Other Problems  
  
  
                      Problem Classification Problem    Date       Documented Da  
te Episodic/Chronic  
   
                                                    Other aftercare  
(1 source)                              Long term   
(current) use of   
anticoagulants;   
Translations:   
[LONG TERM   
CURRNT USE   
ANTICOAGULANTS]                         Onset:   
2022                                          Episodic  
  
  
  
Results  
  
  
                          Test Name    Value        Interpretation Reference   
Range                                   Facility  
   
                                                    MM screening mammo BI w/CADo  
n 2023   
   
                                                    MM screening mammo BI   
w/CAD                                   [facility] [place] [address]  
  
Mammography Report  
Signed  
  
Patient: Giacomo Benavides   
MR#: [id number]  
9  
: 1960   
Acct:[id number]  
  
Age/Sex: 62 / F ADM Date:   
23  
  
Loc: [place] Room: Type: [facility]  
Attending Dr: Cesario Piedra DO  
Copies to: Cesario Piedra DO  
  
  
  
Ordering Provider:   
Cesario Piedra DO  
Date of Service: 23  
Accession #:   
([id number]) MM/MM   
screening mammo BI w/CAD:   
Encounter for screening   
mammogram for  
malignant neoplasm of  
  
  
  
  
CLINICAL DATA: Screening   
for malignancy.  
  
SCREENING MAMMOGRAM -   
FULL FIELD DIGITAL WITH   
TOMOSYNTHESIS AND CAD  
  
COMPARISON:Mammograms   
dating back to   
  
Tomosynthesis   
craniocaudal and   
mediolateral oblique   
views of both breasts   
were obtained using low-  
dose digital technique.   
This examination was   
reviewed with the aid of   
CAD.  
  
The breast tissue is   
composed of scattered   
fibroglandular densities.   
There are no dominant   
masses,  
typically malignant   
calcifications or   
architectural distortion.   
There has been no   
significant  
interval change.  
  
  
  
  
ORDER #: [id number] MM/MM   
screening mammo BI w/CAD  
IMPRESSION:  
  
NO MAMMOGRAPHIC EVIDENCE   
OF MALIGNANCY.  
  
ROUTINE FOLLOW-UP IS   
RECOMMENDED IN ONE YEAR.  
  
RESULT CODE: 1  
Negative  
  
  
DENSITY CODE: 2   
(approximately 25-50%   
glandular)  
  
  
FOLLOW UP: 1YR  
  
  
The false-negative rate   
of mammography is   
approximately 10-percent.  
Management of a palpable   
abnormality must be based   
on clinical grounds.  
Patient was entered into   
a reminder system with a   
target due date for the   
next mammogram.  
  
Impression dictated by:   
Harsh Goodson Jr.,   
[email or web address] 9:36 AM  
  
  
Dictation Location: [facility]  
  
  
  
Transcribed By: [facility]   
23  
Dictated By: Harsh Goodson Jr, DO 23  
  
Signed By:  
23       [facility]  
   
                                                    PROF CHEM 8 (BAS METB)on    
   
                      Anion gap [Moles/Vol] 14.7 mmol/L Normal                [facility]  
   
                                        Comment on above:   Performed By: #### L  
IPID, [id number] ####  
[facility]  
[address] [address]  
Dr. Mae Thompson   
   
                      Calcium [Mass/Vol] 9.3 mg/dL  Normal     8.5-10.1   [facility]  
   
                                        Comment on above:   Performed By: #### L  
IPID, [id number] ####  
[facility]  
[address] [address]  
Dr. Mae Thompson   
   
                      Chloride [Moles/Vol] 99 mmol/L  Normal          [facility]  
   
                                        Comment on above:   Performed By: #### L  
IPID, [id number] ####  
[facility]  
[address] [address]  
Dr. Mae Thompson   
   
                      CO2 [Moles/Vol] 29.3 mmol/L Normal     21.0-32.0  [facility]  
   
                                        Comment on above:   Performed By: #### L  
IPID, [id number] ####  
[facility]  
[address] [address]  
Dr. Mae Thompson   
   
                      Creatinine [Mass/Vol] 0.85 mg/dL Normal     0.55-1.02  [facility]  
   
                                        Comment on above:   Performed By: #### L  
IPID, [id number] ####  
[facility]  
[address] [place] [address]  
Dr. Mae Thompson   
   
                      EGFR-AF AMERICAN >60        Normal     >=60       [facility]  
   
                                        Comment on above:   Performed By: #### L  
IPID, [id number] ####  
[facility]  
[address] [address]  
Dr. Mae Thompson   
   
                      EGFR-NON AF AMERICAN >60        Normal     >=60       [facility]  
   
                                        Comment on above:   Performed By: #### L  
IPID, [id number] ####  
[facility]  
[address] [place] [address]  
Dr. Mae Thompson   
   
                      Glucose [Mass/Vol] 89 mg/dL   Normal          [facility]  
   
                                        Comment on above:   Performed By: #### L  
IPID, [id number] ####  
[facility]  
[address] [place] [address]  
Dr. Mae Thompson   
   
                      Potassium [Moles/Vol] 4.0 mmol/L Normal     3.5-5.1    [facility]  
   
                                        Comment on above:   Performed By: #### L  
IPID, [id number] ####  
[facility]  
[address] [place] [address]  
Dr. Mae Thompson   
   
                      Sodium [Moles/Vol] 139 mmol/L Normal     136-145    [facility]  
   
                                        Comment on above:   Performed By: #### L  
IPID, [id number] ####  
[facility]  
[address] [place] [address]  
Dr. Mae Thompson   
   
                                                    Urea nitrogen   
[Mass/Vol]      9.0 mg/dL       Normal          7.0-18.0        [facility]  
   
                                        Comment on above:   Performed By: #### L  
IPID, [id number] ####  
[facility]  
[address] [place] [address]  
Dr. Mae Thompson   
   
                                                    Urea   
nitrogen/Creatinine   
[Mass ratio]    10.6 mg/mg      Normal                          [facility]  
   
                                        Comment on above:   Performed By: #### L  
IPID, [id number] ####  
[facility]  
[address] [place] [address]  
Dr. Mae Thompson   
   
                                                    XR HAND ALEXIS MIN 3Von   
023   
   
                                        XR HAND ALEXIS MIN 3V  EXAMINATION: XR HAND  
 ALEXIS   
MIN 3V, XR WRIST ALEXIS MIN   
3 V  
HISTORY: Pain of   
bilateral hands  
COMPARISON: No relevant   
comparison available.  
FINDINGS:  
RIGHT FINDINGS:  
BONES: No significant   
arthropathy or acute   
abnormality.  
SOFT TISSUES: No visible   
soft tissue swelling.  
OTHER: Negative.  
LEFT FINDINGS:  
BONES: Small cortical   
erosion along the medial   
head of first metacarpal   
and  
medial base of fifth   
metacarpal. Partial   
subluxation of the first  
carpal-metacarpal joint   
bilaterally. No fracture,   
dislocation, bone lesion.  
SOFT TISSUES: No visible   
soft tissue swelling.  
OTHER: Negative.  
IMPRESSION:  
RIGHT CONCLUSION: No   
significant findings to   
suggest rheumatoid   
arthritis.  
Laxity/mild subluxation   
of the first   
metacarpophalangeal   
joint. Relative  
arthritic/degenerative   
sparing of the remaining   
joints.  
LEFT CONCLUSION: Small   
erosion involving the   
first and fifth   
metacarpals which  
can be seen with   
rheumatoid arthritis.   
Relative   
arthritic/degenerative   
sparing  
of the joints.  
Electronically   
authenticated by: ROGELIO VELEZ Date: 2023   
08:38               Normal                                  [facility]  
   
                                                    CBC AUTO DIFFon 04-   
   
                      BASO #     0.0 103/ul Normal     0.0-0.1    [facility]  
   
                                        Comment on above:   Performed By: #### C  
BC ####  
[facility]  
[address] [place] [address]  
Dr. Mae Thompson   
   
                                                    Basophils/100 WBC   
(Bld)           0.3 %           Normal          0.2-2.0         [facility]  
   
                                        Comment on above:   Performed By: #### C  
BC ####  
[facility]  
[address] [place] [address]  
Dr. Mae Thompson   
   
                      EO #       0.2 103/ul Normal     0.0-0.7    [facility]  
   
                                        Comment on above:   Performed By: #### C  
BC ####  
[facility]  
[address] [place] [address]  
Dr. Mae Thompson   
   
                                                    Eosinophils/100 WBC   
(Bld)           2.0 %           Normal          0.9-7.0         [facility]  
   
                                        Comment on above:   Performed By: #### C  
BC ####  
[facility]  
[address] [place] [address]  
Dr. Mae Thompson   
   
                                                    Erythrocyte   
distribution width   
(RBC) [Ratio]   15.9 %          Critically high 11.0-15.0       [facility]  
   
                                        Comment on above:   Performed By: #### C  
BC ####  
[facility]  
[address] [place] [address]  
Dr. Mae Thompson   
   
                                                    Hematocrit (Bld)   
[Volume fraction] 44.3 %          Normal          36.0-48.0       [facility]  
   
                                        Comment on above:   Performed By: #### C  
BC ####  
[facility]  
[address] [place] [address]  
Dr. Mae Thompson   
   
                                                    Hemoglobin (Bld)   
[Mass/Vol]      14.3 g/dL       Normal          12.0-16.0       [facility]  
   
                                        Comment on above:   Performed By: #### C  
BC ####  
[facility]  
[address] [place] [address]  
Dr. Mae Thompson   
   
                      IG #       0.03 10e3/ul Normal     0.00-0.03  [facility]  
   
                                        Comment on above:   Performed By: #### C  
BC ####  
[facility]  
[address] [place] [address]  
Dr. Mae Thompson   
   
                      IG %       0.3 %      Normal     0.0-0.5    [facility]  
   
                                        Comment on above:   Performed By: #### C  
BC ####  
[facility]  
[address] [place] [address]  
Dr. Mae Thompson   
   
                      LYMPH #    2.4 103/ul Normal     1.2-3.8    [facility]  
   
                                        Comment on above:   Performed By: #### C  
BC ####  
[facility]  
[address] [place] [address]  
Dr. Mae Thompson   
   
                                                    Lymphocytes/100 WBC   
(Bld)           24.2 %          Normal          20.5-60.0       [facility]  
   
                                        Comment on above:   Performed By: #### C  
BC ####  
[facility]  
[address] [place] [address]  
Dr. Mae Thompson   
   
                      MANUAL DIFF REQ NO         Normal                [facility]  
   
                                        Comment on above:   Performed By: #### C  
BC ####  
[facility]  
[address] [place] [address]  
Dr. Mae Thompson   
   
                                                    MCH (RBC) [Entitic   
mass]           29.1 pg         Normal          26.7-34.0       [facility]  
   
                                        Comment on above:   Performed By: #### C  
BC ####  
[facility]  
[address] [address]  
Dr. Mae Thompson   
   
                      MCHC (RBC) [Mass/Vol] 32.3 g/dL  Normal     29.9-35.2  [facility]  
 [facility]  
   
                                        Comment on above:   Performed By: #### C  
BC ####  
[facility]  
[address] [place] [address]  
Dr. Mae Thompson   
   
                                                    MCV (RBC) [Entitic   
vol]            90.2 fL         Normal          81.0-99.0       [facility]  
   
                                        Comment on above:   Performed By: #### C  
BC ####  
[facility]  
[address] [place] [address]  
Dr. Mae Thompson   
   
                      MONO #     0.9 103/ul Critically high 0.3-0.8    [facility]  
   
                                        Comment on above:   Performed By: #### C  
BC ####  
[facility]  
[address] [place] [address]  
Dr. Mae Thompson   
   
                                                    Monocytes/100 WBC   
(Bld)           9.1 %           Normal          1.7-12.0        [facility]  
   
                                        Comment on above:   Performed By: #### C  
BC ####  
[facility]  
[address] [place] [address]  
Dr. Mae Thompson   
   
                      NEUT #     6.2 103/ul Normal     1.4-6.5    [facility]  
   
                                        Comment on above:   Performed By: #### C  
BC ####  
[facility]  
[address] [place] [address]  
Dr. Mae Thompson   
   
                                                    Neutrophils/100 WBC   
(Bld)           64.1 %          Normal          43.0-75.0       [facility]  
   
                                        Comment on above:   Performed By: #### C  
BC ####  
[facility]  
[address] [place] [address]  
Dr. Mae Thompson   
   
                                                    Platelet mean volume   
(Bld) [Entitic vol] 10.5 fL         Normal          9.5-13.5        [facility]  
   
                                        Comment on above:   Performed By: #### C  
BC ####  
[facility]  
[address] [place] [address]  
Dr. Mae Thompson   
   
                      PLT        341 103/ul Normal     150-450    [facility]  
   
                                        Comment on above:   Performed By: #### C  
BC ####  
[facility]  
[address] [address]  
Dr. Mae Thompson   
   
                      RBC        4.91 106/ul Normal     4.20-5.40  [facility]  
   
                                        Comment on above:   Performed By: #### C  
BC ####  
[facility]  
[address] [place] [address]  
Dr. Mae Thompson   
   
                      WBC        9.7 103/ul Normal     4.0-11.0   [facility]  
   
                                        Comment on above:   Performed By: #### C  
BC ####  
[facility]  
[address] [address]  
Dr. Mae Thompson   
   
                                                    CRPon 04-   
   
                      CRP [Mass/Vol] mg/L       Normal     <=1.0      [facility]  
   
                                        Comment on above:   Performed By: #### L  
MIMAID [id number] ####  
[facility]  
[address] [place] [address]  
Dr. Mae Thompson   
   
                                                    LIPID PROFILEon 04-   
   
                      CHOL-HDL RATIO NORM SEE BELOW  Normal                [facility]  
   
                                        Comment on above:   Result Comment: 3.3   
- 4.4 LOW RISK 4.4 - 7.1 AVERAGE RISK 7.1   
-   
11.0 MODERATE RISK >11.0 HIGH RISK   
   
                                                            Performed By: #### L  
IPID, [id number] ####  
[facility]  
[address] [place] [address]  
Dr. Mae Thompson   
   
                      Cholesterol [Mass/Vol] 146 mg/dL  Normal     <=200      [facility]  
   
                                        Comment on above:   Performed By: #### L  
IPID, [id number] ####  
[facility]  
[address] [place] [address]  
Dr. Mae Thompson   
   
                                                    Cholesterol in HDL   
[Mass/Vol]      43 mg/dL        Normal          40-60           [facility]  
   
                                        Comment on above:   Performed By: #### L  
IPID, [id number] ####  
[facility]  
[address] [place] [address]  
Dr. Mae Thompson   
   
                                                    Cholesterol in LDL   
[Mass/Vol]      76.2 mg/dL      Normal                          [facility]  
   
                                        Comment on above:   Performed By: #### L  
IPID, [id number] ####  
[facility]  
[address] [address]  
Dr. Mae Thompson   
   
                                                    Cholesterol.total/Chol  
esterol in HDL [Mass   
ratio]          3.4 {ratio}     Normal                          [facility]  
   
                                        Comment on above:   Performed By: #### L  
IPID, [id number] ####  
[facility]  
[address] [address]  
Dr. Mae Thompson   
   
                                        HDL NORMAL          > or = 60 mg/dl - LO  
W   
CARDIOVASCULAR RISK <40   
mg/dl - HIGH   
CARDIOVASCULAR RISK Normal                                  [facility]  
   
                                        Comment on above:   Performed By: #### L  
IPID [id number] ####  
[facility]  
[address] [address]  
Dr. Mae Thompson   
   
                      LDL CALC NORMAL SEE BELOW  Normal                [facility]  
   
                                        Comment on above:   Result Comment: <100  
 mg/dl OPTIMAL 100 - 129 mg/dl NEAR OR   
ABOVE OPTIMAL 130 - 159 mg/dl BORDERLINE HIGH 160 - 189 mg/dl   
HIGH >190 mg/dl VERY HIGH   
   
                                                            Performed By: #### L  
IPID, [id number] ####  
[facility]  
[address] [address]  
Dr. Mae Thompson   
   
                                                    Triglyceride   
[Mass/Vol]      134 mg/dL       Normal          <=150           [facility]  
   
                                        Comment on above:   Performed By: #### L  
IPID, [id number] ####  
[facility]  
[address] [place] [address]  
Dr. Mae Thompson   
   
                      VLDL CALC  26.8 mg/dL Normal                [facility]  
   
                                        Comment on above:   Performed By: #### L  
IPID, [id number] ####  
[facility]  
[address] [address]  
Dr. Mae Thompson   
   
                                                    PROF 14(COMP METB)on 04-10-2  
023   
   
                      Albumin [Mass/Vol] 3.6 g/dL   Normal     3.4-5.0    [facility]  
   
                                        Comment on above:   Performed By: #### L  
IPID, [id number] ####  
[facility]  
[address] [address]  
Dr. Mae Thompson   
   
                                                    Albumin/Globulin [Mass   
ratio]          0.8 {ratio}     Normal                          [facility]  
   
                                        Comment on above:   Performed By: #### L  
IPID, [id number] ####  
[facility]  
[address] [address]  
Dr. Mae Thompson   
   
                                                    ALP [Catalytic   
activity/Vol]   99 U/L          Normal                    [facility]  
   
                                        Comment on above:   Performed By: #### L  
IPID, [id number] ####  
[facility]  
[address] [place] [address]  
Dr. Mae Thompson   
   
                                                    ALT [Catalytic   
activity/Vol]   21 U/L          Normal          14-59           [facility]  
   
                                        Comment on above:   Performed By: #### L  
IPID, [id number] ####  
[facility]  
[address] [place] [address]  
Dr. Mae Thompson   
   
                      Anion gap [Moles/Vol] 12.9 mmol/L Normal                [facility]  
   
                                        Comment on above:   Performed By: #### L  
IPID, [id number] ####  
[facility]  
[address] [place] [address]  
Dr. Mae Thompson   
   
                                                    AST [Catalytic   
activity/Vol]   54 U/L          Critically high 15-37           [facility]  
   
                                        Comment on above:   Performed By: #### L  
IPID, [id number] ####  
[facility]  
[address] [place] [address]  
Dr. Mae Thompson   
   
                      Bilirubin [Mass/Vol] 0.7 mg/dL  Normal     0.2-1.0    [facility]  
   
                                        Comment on above:   Performed By: #### L  
IPID, [id number] ####  
[facility]  
[address] [place] [address]  
Dr. Mae Thompson   
   
                      Calcium [Mass/Vol] 10.1 mg/dL Normal     8.5-10.1   [facility]  
   
                                        Comment on above:   Performed By: #### L  
IPID, [id number] ####  
[facility]  
[address] [place] [address]  
Dr. Mae Thompson   
   
                      Chloride [Moles/Vol] 101 mmol/L Normal          [facility]  
   
                                        Comment on above:   Performed By: #### L  
IPID, [id number] ####  
[facility]  
[address] [place] [address]  
Dr. Mae Thompson   
   
                      CO2 [Moles/Vol] 30.2 mmol/L Normal     21.0-32.0  [facility]  
   
                                        Comment on above:   Performed By: #### L  
IPID, [id number] ####  
[facility]  
[address] [address]  
Dr. Mae Thompson   
   
                      Creatinine [Mass/Vol] 0.93 mg/dL Normal     0.55-1.02  The  
 [facility]  
   
                                        Comment on above:   Performed By: #### L  
IPID, [id number] ####  
[facility]  
[address] [address]  
Dr. aMe Thompson   
   
                      EGFR-AF AMERICAN >60        Normal     >=60       [facility]  
   
                                        Comment on above:   Performed By: #### L  
IPID, [id number] ####  
[facility]  
[address] [place] [address]  
Dr. Mae Thompson   
   
                      EGFR-NON AF AMERICAN >60        Normal     >=60       [facility]  
   
                                        Comment on above:   Performed By: #### L  
IPID, [id number] ####  
[facility]  
[address] [place] [address]  
Dr. Mae Thompson   
   
                                                    Globulin (S)   
[Mass/Vol]      4.3 g/dL        Normal                          [facility]  
   
                                        Comment on above:   Performed By: #### L  
IPID, [id number] ####  
[facility]  
[address] [place] [address]  
Dr. Mae Thompson   
   
                      Glucose [Mass/Vol] 100 mg/dL  Normal          [facility]  
   
                                        Comment on above:   Performed By: #### L  
IPID, [id number] ####  
[facility]  
[address] [place] [address]  
Dr. Mae Thompson   
   
                      Potassium [Moles/Vol] 4.1 mmol/L Normal     3.5-5.1    The  
 [facility]  
   
                                        Comment on above:   Performed By: #### L  
IPID, [id number] ####  
[facility]  
[address] [place] [address]  
Dr. Mae Thompson   
   
                      Protein [Mass/Vol] 7.9 g/dL   Normal     6.4-8.2    [facility]  
   
                                        Comment on above:   Performed By: #### L  
IPID, [id number] ####  
[facility]  
[address] [address]  
Dr. Mae Thompson   
   
                      Sodium [Moles/Vol] 140 mmol/L Normal     136-145    [facility]  
   
                                        Comment on above:   Performed By: #### L  
IPID, [id number] ####  
[facility]  
[address] [address]  
Dr. Mae Thompson   
   
                                                    Urea nitrogen   
[Mass/Vol]      10.0 mg/dL      Normal          7.0-18.0        [facility]  
   
                                        Comment on above:   Performed By: #### L  
IPID, [id number] ####  
[facility]  
[address] [place] [address]  
Dr. Mae Thompson   
   
                                                    Urea   
nitrogen/Creatinine   
[Mass ratio]    10.8 mg/mg      Normal                          [facility]  
   
                                        Comment on above:   Performed By: #### L  
IPID, [id number] ####  
[facility]  
[address] [place] [address]  
Dr. Mae Thompson   
   
                                                    SED RATE WESTERGRENon 04-10-  
2023   
   
                      SED RATE   32 mm/hr   Critically high <=30       [facility]  
   
                                        Comment on above:   Performed By: #### S  
EDR ####  
[facility]  
[address] [place] [address]  
Dr. Mae Thompson   
   
                                                    TSH W/ REFLEX TO FT4on 04-10  
-2023   
   
                          TSH          0.637 uIU/mL Normal       0.358-3.74  
0                                       [facility]  
   
                                        Comment on above:   Performed By: #### L  
IPID, [id number] ####  
[facility]  
[address] [place] [address]  
Dr. Mae Thompson   
   
                                                    XR CHEST 2 Von 04-   
   
                                        XR CHEST 2 V        EXAM: XR CHEST 2 V  
HISTORY: Rheumatoid   
arthritis  
COMPARISON: 3/3/2022  
TECHNIQUE: PA and lateral   
views of the chest.  
FINDINGS:  
The cardiomediastinal   
silhouette is normal.  
No focal consolidation is   
identified.  
There is no pneumothorax.  
No pleural effusion is   
noted.  
The osseous structures   
are intact.  
IMPRESSION:  
No acute cardiopulmonary   
process.  
Electronically   
authenticated by: SALVADOR HUANG   
Date: 2023-04-10 12:48 Normal                                  [facility]  
   
                                                    Albumin [Mass/volume] in Ser  
um or PlasmaOrdered By: Cesario Joaquin on 2023  
   
   
                      Albumin [Mass/Vol] 4.2 g/dL              3.2-5.5    [facility]  
   
                                                    Basophils Auto (Bld) [#/Vol]  
Ordered By: Cesario Joaquin on 2023   
   
                                                    Basophils (Bld)   
[#/Vol]         0.1 10*3/uL                     0.0-0.2         [facility]  
   
                                                    Basophils/100 WBC Auto (Bld)  
Ordered By: Cesario Joaquin on 2023   
   
                                                    Basophils/100 WBC   
(Bld)           0.6 %                           .               [facility]  
   
                                                    Complete Blood Count Auto Di  
ffon 2023   
   
                                                    Basophils (Bld)   
[#/Vol]         0.1 10*3/uL     Normal          0.0-0.2         [facility]  
   
                                        Comment on above:   Performed By: #### E  
SR, CBC, CMP ####  
[facility]  
[address] [place] [address]   
   
                                                    Basophils/100 WBC   
(Bld)           0.6 %           Normal          .               [facility]  
   
                                        Comment on above:   Performed By: #### E  
SR, CBC, CMP ####  
[facility]  
[address] [place] [address]   
   
                                                    Eosinophils (Bld)   
[#/Vol]         0.0 10*3/uL     Normal          0.0-0.45        [facility]  
   
                                        Comment on above:   Performed By: #### E  
SR, CBC, CMP ####  
[address]   
   
                                                    Eosinophils/100 WBC   
(Bld)           0.5 %           Normal          .               [facility]  
   
                                        Comment on above:   Performed By: #### E  
SR, CBC, CMP ####  
[facility]  
[address]   
   
                                                    Erythrocyte   
distribution width   
(RBC) [Ratio]   14.8 %          Normal          11.9-15.3       [facility]  
   
                                        Comment on above:   Performed By: #### E  
SR, CBC, CMP ####  
[facility]  
[address]   
   
                                                    Hematocrit (Bld)   
[Volume fraction] 46.9 %          High            34.0-46.4       [facility]  
   
                                        Comment on above:   Performed By: #### E  
SR, CBC, CMP ####  
[facility]  
[address]   
   
                                                    Hemoglobin (Bld)   
[Mass/Vol]      15.2 g/dL       Normal          11.8-15.4       [facility]  
   
                                        Comment on above:   Performed By: #### E  
SR, CBC, CMP ####  
[address]   
   
                                                    Lymphocytes (Bld)   
[#/Vol]         2.6 10*3/uL     Normal          1.00-4.8        [facility]  
   
                                        Comment on above:   Performed By: #### E  
SR, CBC, CMP ####  
[address]   
   
                                                    Lymphocytes/100 WBC   
(Bld)           27.5 %          Normal          .               [facility]  
   
                                        Comment on above:   Performed By: #### E  
SR, CBC, CMP ####  
[address]   
   
                                                    MCH (RBC) [Entitic   
mass]           28.5 pg         Normal          24.7-34.3       [facility]  
   
                                        Comment on above:   Performed By: #### E  
SR, CBC, CMP ####  
[address]   
   
                                                    MCV (RBC) [Entitic   
vol]            88.1 fL         Normal                    [facility]  
   
                                        Comment on above:   Performed By: #### E  
SR, CBC, CMP ####  
[address]   
   
                                                    Mean Corpuscular HGB   
Conc            32.4 g/dL       Normal          32.0-35.0       [facility]  
   
                                        Comment on above:   Performed By: #### E  
SR, CBC, CMP ####  
[place] [address] [place]   
   
                                                    Monocytes (Bld)   
[#/Vol]         1.0 10*3/uL     High            0.0-0.8         [facility]  
   
                                        Comment on above:   Performed By: #### E  
SR, CBC, CMP ####  
[address]   
   
                                                    Monocytes/100 WBC   
(Bld)           10.3 %          Normal          .               [facility]  
   
                                        Comment on above:   Performed By: #### E  
SR, CBC, CMP ####  
[facility]  
[address] [place] [address] [place]   
   
                                                    Neutrophils (Bld)   
[#/Vol]         5.8 10*3/uL     Normal          1.8-7.7         [facility]  
   
                                        Comment on above:   Performed By: #### E  
SR, CBC, CMP ####  
[facility]  
[address] [place] [address] [place]   
   
                                                    Neutrophils/100 WBC   
(Bld)           61.1 %          Normal          .               [facility]  
   
                                        Comment on above:   Performed By: #### E  
SR, CBC, CMP ####  
[facility]  
[address]   
   
                      NRBC%      0.1 /100{WBC} Normal     0-0.5      [facility]  
   
                                        Comment on above:   Performed By: #### E  
SR, CBC, CMP ####  
[facility]  
[address]   
   
                                                    Platelet mean volume   
(Bld) [Entitic vol] 10.2 fL         Normal          6.3-10.7        [facility]  
   
                                        Comment on above:   Performed By: #### E  
SR, CBC, CMP ####  
[facility]  
[address] [place] [address] [place]   
   
                                                    Platelets (Bld)   
[#/Vol]         357 10*3/uL     Normal          150-450         [facility]  
   
                                        Comment on above:   Performed By: #### E  
SR, CBC, CMP ####  
[facility]  
[address]   
   
                      RBC (Bld) [#/Vol] 5.32 10*6/uL High       3.60-5.00  [facility]  
   
                                        Comment on above:   Performed By: #### E  
SR, CBC, CMP ####  
[facility]  
[address] [place] [address] [place]   
   
                      WBC (Bld) [#/Vol] 9.5 10*3/uL Normal     3.8-11.6   [facility]  
   
                                        Comment on above:   Performed By: #### E  
SR, CBC, CMP ####  
[facility]  
[address]   
   
                                                    Comprehensive Metabolic Pane  
isma 2023   
   
                      Albumin [Mass/Vol] 4.2 g/dL   Normal     3.2-5.5    [facility]  
   
                                        Comment on above:   Performed By: #### E  
SR, CBC, CMP ####  
[address]   
   
                                                    Albumin/Globulin [Mass   
ratio]          1.2 {ratio}     Normal                          [facility]  
   
                                        Comment on above:   Performed By: #### E  
SR, CBC, CMP ####  
[address]   
   
                                                    ALP [Catalytic   
activity/Vol]   105 U/L         High            32-92           [facility]  
   
                                        Comment on above:   Result Comment: PERF  
ORMED BY:  
[place] [address]  
[phone]  
PATHOLOGIST MEDICAL DIRECTOR  
KAYLAH GEORGE M.D.   
   
                                                            Performed By: #### E  
SR, CBC, CMP ####  
[address]   
   
                                                    ALT [Catalytic   
activity/Vol]   12 U/L          Normal          10-60           [facility]  
   
                                        Comment on above:   Performed By: #### E  
SR, CBC, CMP ####  
[address]   
   
                      Anion gap [Moles/Vol] 12.9 mmol/L Normal     6.0-15.0   [facility]  
   
                                        Comment on above:   Performed By: #### E  
SR, CBC, CMP ####  
[address]   
   
                                                    AST [Catalytic   
activity/Vol]   16 U/L          Normal          10-42           [facility]  
   
                                        Comment on above:   Performed By: #### E  
SR, CBC, CMP ####  
[facility]  
[address] [place] [address]   
   
                      Bilirubin [Mass/Vol] 0.7 mg/dL  Normal     0.3-1.2    [facility]  
   
                                        Comment on above:   Performed By: #### E  
SR, CBC, CMP ####  
[facility]  
[address] [place] [address]   
   
                      Calcium [Mass/Vol] 9.9 mg/dL  Normal     8.2-10.2   [facility]  
   
                                        Comment on above:   Performed By: #### E  
SR, CBC, CMP ####  
[facility]  
[address] [place] [address] [place]   
   
                      Chloride [Moles/Vol] 99 mmol/L  Normal          [facility]  
   
                                        Comment on above:   Performed By: #### E  
SR, CBC, CMP ####  
[facility]  
[address]   
   
                      CO2 [Moles/Vol] 26.6 mmol/L Normal     22.0-30.0  [facility]  
   
                                        Comment on above:   Performed By: #### E  
SR, CBC, CMP ####  
[facility]  
[address]   
   
                      Creatinine [Mass/Vol] 0.89 mg/dL Normal     0.44-1.03  [facility]  
   
                                        Comment on above:   Performed By: #### E  
SR, CBC, CMP ####  
[facility]  
[address] [place] [address]   
   
                                                    Estimated GFR (   
Taylor         > 60            [facility]  
   
                                        Comment on above:   Result Comment: GFR   
estimated reference range: According to   
KDOQI  
guidelines, <60 ml/min/1.73m2 is sufficient to diagnose a  
patient with chronic kidney disease.   
   
                                                            Performed By: #### E  
SR, CBC, CMP ####  
[facility]  
[address] [place] [address]   
   
                                                    Estimated GFR   
(Non- Am > 60            [facility]  
   
                                        Comment on above:   Performed By: #### E  
SR, CBC, CMP ####  
[facility]  
[address]   
   
                                                    Globulin (S)   
[Mass/Vol]      3.4 g/dL        [facility]  
   
                                        Comment on above:   Performed By: #### E  
SR, CBC, CMP ####  
[facility]  
[address] [place] [address]   
   
                      Glucose [Mass/Vol] 91 mg/dL   Normal          [facility]  
   
                                        Comment on above:   Result Comment: Rand  
 Glucose Reference Range is dependent on  
   
time and  
content of last meal. Glucose of more than 200 mg/dL in a  
nonstressed, ambulatory subject supports the diagnosis of  
Diabetes Mellitus.  
ADA recommended reference range   
   
                                                            Performed By: #### E  
SR, CBC, CMP ####  
[facility]  
[address] [id number] [place]   
   
                      Potassium [Moles/Vol] 4.5 mmol/L Normal     3.5-5.1    [facility]  
   
                                        Comment on above:   Performed By: #### E  
SR, CBC, CMP ####  
[facility]  
[address] [place] [address] [place]   
   
                      Protein [Mass/Vol] 7.6 g/dL   Normal     6.1-7.9    [facility]  
   
                                        Comment on above:   Performed By: #### E  
SR, CBC, CMP ####  
[facility]  
[address] [place] [address] [place]   
   
                      Sodium [Moles/Vol] 134 mmol/L Low        136-146    [facility]  
   
                                        Comment on above:   Performed By: #### E  
SR, CBC, CMP ####  
[facility]  
[address] [place] [address] [place]   
   
                                                    Urea nitrogen   
[Mass/Vol]      10 mg/dL        Normal          9-23            [facility]  
   
                                        Comment on above:   Performed By: #### E  
SR, CBC, CMP ####  
[facility]  
[address] [place] [address] [place]   
   
                                                    Creatinine and Glomerular fi  
ltration rate.predicted panel (S/P/Bld)Ordered By:   
Cesario Joaquin on 2023   
   
                      Creatinine [Mass/Vol] 0.89 mg/dL            0.44-1.03  [facility]  
   
                                                    Eosinophils Auto (Bld) [#/Vo  
l]Ordered By: Cesario Joaquin on 2023   
   
                                                    Eosinophils (Bld)   
[#/Vol]         0.0 10*3/uL                     0.0-0.45        [facility]  
   
                                                    Eosinophils/100 WBC Auto (Bl  
d)Ordered By: Cesario Joaquin on 2023   
   
                                                    Eosinophils/100 WBC   
(Bld)           0.5 %                           .               [facility]  
   
                                                    Erythrocyte Sedimentation Ra  
rubi 2023   
   
                      ESR (Bld) [Velocity] 62 mm/h    High       0-29       [facility]  
   
                                        Comment on above:   Result Comment: PERF  
ORMED BY:  
[facility]  
[address] [place] [address]  
[phone]  
PATHOLOGIST MEDICAL DIRECTOR  
KAYLAH GEORGE M.D.   
   
                                                            Performed By: #### E  
SR, CBC, CMP ####  
[facility]  
[address]   
   
                                                    Erythrocyte distribution wid  
th Auto (RBC) [Ratio]Ordered By: Cesario Joaquin on   
2023   
   
                                                    Erythrocyte   
distribution width   
(RBC) [Ratio]   14.8 %                          11.9-15.3       [facility]  
   
                                                    Erythrocyte sedimentation ra  
te by Photometric methodOrdered By: Cesario Joaquin   
on   
2023   
   
                                                    ESR Photometric method   
(Bld) [Velocity] 62 mm/hr                        0-29            [facility]  
   
                                                    Estimated glomerular filtrat  
ion rate (GFR) non- AmericanOrdered By:   
Cesario Joaquin on 2023   
   
                                                    GFR/1.73 sq   
M.predicted among   
non-blacks MDRD   
(S/P/Bld) [Vol   
rate/Area]      > 60 mL/Min                                     [facility]  
   
                                                    Globulin Calc (S) [Mass/Vol]  
Ordered By: Cesario Joaquin on 2023   
   
                                                    Globulin (S)   
[Mass/Vol]      3.4 g/dL                                        [facility]  
   
                                                    Hematocrit Auto (Bld) [Volum  
e fraction]Ordered By: Cesario Joaquin on 2023   
   
                                                    Hematocrit (Bld)   
[Volume fraction] 46.9 %                          34.0-46.4       [facility]  
   
                                                    Hemoglobin [Mass/volume] in   
BloodOrdered By: Cesario Joaquin on 2023   
   
                                                    Hemoglobin (Bld)   
[Mass/Vol]      15.2 g/dL                       11.8-15.4       [facility]  
   
                                                    Leukocytes [#/volume] correc  
vinh for nucleated erythrocytes in Blood by Automated  
   
counOrdered By: Cesario Joaquin on 2023   
   
                                                    WBC corrected for nucl   
RBC Auto (Bld) [#/Vol] 9.5 10*3/uL                     3.8-11.6        [facility]  
   
                                                    Lymphocytes Auto (Bld) [#/Vo  
l]Ordered By: Cesario Joaquin on 2023   
   
                                                    Lymphocytes (Bld)   
[#/Vol]         2.6 10*3/uL                     1.00-4.8        [facility]  
   
                                                    Lymphocytes/100 WBC Auto (Bl  
d)Ordered By: Cesario Joaquin on 2023   
   
                                                    Lymphocytes/100 WBC   
(Bld)           27.5 %                          .               [facility]  
   
                                                    MCH Auto (RBC) [Entitic mass  
]Ordered By: Cesario Joaquin on 2023   
   
                                                    MCH (RBC) [Entitic   
mass]           28.5 pg                         24.7-34.3       [facility]  
   
                                                    MCHC Auto (RBC) [Mass/Vol]Or  
dered By: Cesario Joaquin on 2023   
   
                      MCHC (RBC) [Mass/Vol] 32.4 g/dL             32.0-35.0  [facility]  
   
                                                    MCV Auto (RBC) [Entitic vol]  
Ordered By: Cesario Joaquin on 2023   
   
                                                    MCV (RBC) [Entitic   
vol]            88.1 fL                                   [facility]  
   
                                                    Monocytes Auto (Bld) [#/Vol]  
Ordered By: Cesario Joaquin on 2023   
   
                                                    Monocytes (Bld)   
[#/Vol]         1.0 10*3/uL                     0.0-0.8         [facility]  
   
                                                    Monocytes/100 WBC Auto (Bld)  
Ordered By: Cesario Joaquin on 2023   
   
                                                    Monocytes/100 WBC   
(Bld)           10.3 %                          .               [facility]  
   
                                                    Neutrophils Auto (Bld) [#/Vo  
l]Ordered By: Cesario Joaquin on 2023   
   
                                                    Neutrophils (Bld)   
[#/Vol]         5.8 10*3/uL                     1.8-7.7         [facility]  
   
                                                    Neutrophils/100 WBC Auto (Bl  
d)Ordered By: Cesario Joaquin on 2023   
   
                                                    Neutrophils/100 WBC   
(Bld)           61.1 %                          .               [facility]  
   
                                                    No Panel InformationOrdered   
By: Cesario Joaquin on 2023   
   
                                                    Estimated GFR (   
American)       > 60 mL/Min                                     [facility]  
   
                                        Comment on above:   GFR estimated refere  
nce range: According to KDOQI guidelines,   
<60 ml/min/1.73m2 is sufficient to diagnose a patient with   
chronic kidney disease.   
   
                                                    Pharmacy Creatinine   
Clearance (Chem N/A                                             [facility]  
   
                                                    Nucleated erythrocytes [Pres  
ence] in Blood by Automated countOrdered By: Cesario Joaquin on 2023   
   
                                                    Nucleated RBC Auto Ql   
(Bld)           0.1 /100{WBC}                   0-0.5           [facility]  
   
                                                    Platelet mean volume Auto (B  
ld) [Entitic vol]Ordered By: Cesario Joaquin on   
2023   
   
                                                    Platelet mean volume   
(Bld) [Entitic vol] 10.2 fL                         6.3-10.7        [facility]  
   
                                                    Platelets Auto (Bld) [#/Vol]  
Ordered By: Cesario Joaquin on 2023   
   
                                                    Platelets (Bld)   
[#/Vol]         357 10*3/uL                     150-450         [facility]  
   
                                                    Protein [Mass/volume] in Ser  
um or PlasmaOrdered By: Cesario Joaquin on 2023  
   
   
                      Protein [Mass/Vol] 7.6 g/dL              6.1-7.9    [facility]  
   
                                                    RBC Auto (Bld) [#/Vol]Ordere  
d By: Cesario Joaquin on 2023   
   
                      RBC (Bld) [#/Vol] 5.32 10*6/uL            3.60-5.00  [facility]  
   
                                                    Serum or plasma alanine amin  
otransferase measurement without P-5'-P (enzymatic   
activiOrdered By: Cesario Joaquin on 2023   
   
                                                    ALT No additional   
P-5'-P [Catalytic   
activity/Vol]   12 U/L                          10-60           [facility]  
   
                                                    Serum or plasma albumin/glob  
ulin mass ratioOrdered By: Cesario Joaquin on   
2023   
   
                                                    Albumin/Globulin [Mass   
ratio]          1.2 {ratio}                                     [facility]  
   
                                                    Serum or plasma alkaline amanda  
sphatase measurement (enzymatic   
activity/volume)Ordered   
By: Cesario Joaquin on 2023   
   
                                                    ALP [Catalytic   
activity/Vol]   105 U/L                         32-92           [facility]  
   
                                                    Serum or plasma anion gap de  
terminationOrdered By: Cesario Joaquin on 2023   
   
                      Anion gap [Moles/Vol] 12.9 mmol/L            6.0-15.0   [facility]  
   
                                                    Serum or plasma aspartate am  
inotransferase measurement (enzymatic   
activity/volume)Ordered By: Cesario Joaquin on 2023   
   
                                                    AST [Catalytic   
activity/Vol]   16 U/L                          10-42           [facility]  
   
                                                    Serum or plasma calcium sue  
urement (mass/volume)Ordered By: Cesario Joaquin on   
2023   
   
                      Calcium [Mass/Vol] 9.9 mg/dL             8.2-10.2   [facility]  
   
                                                    Serum or plasma chloride manjit  
surement (moles/volume)Ordered By: Cesario Joaquin on  
   
2023   
   
                      Chloride [Moles/Vol] 99 mmol/L                  [facility]  
   
                                                    Serum or plasma glucose sue  
urement (mass/volume)Ordered By: Cesario Joaquin on   
2023   
   
                      Glucose [Mass/Vol] 91 mg/dL                   [facility]  
   
                                        Comment on above:   ADA recommended refe  
rence rangeRandom Glucose Reference Range   
is dependent on time and content of last meal. Glucose of more   
than 200 mg/dL in a nonstressed, ambulatory subject supports   
the diagnosis of Diabetes Mellitus.   
   
                                                    Serum or plasma potassium me  
asurement (moles/volume)Ordered By: Cesario Joaquin   
on   
2023   
   
                      Potassium [Moles/Vol] 4.5 mmol/L            3.5-5.1    [facility]  
   
                                                    Serum or plasma sodium measu  
rement (moles/volume)Ordered By: Cesario Joaquin on   
2023   
   
                      Sodium [Moles/Vol] 134 mmol/L            136-146    [facility]  
   
                                                    Serum or plasma total biliru  
bin measurement (mass/volume)Ordered By: Cesario Joaquin   
on 2023   
   
                      Bilirubin [Mass/Vol] 0.7 mg/dL             0.3-1.2    [facility]  
   
                                                    Serum or plasma total carbon  
 dioxide measurement (moles/volume)Ordered By:   
Cesario Joaquin on 2023   
   
                      CO2 [Moles/Vol] 26.6 mmol/L            22.0-30.0  [facility]  
   
                                                    Serum or plasma urea nitroge  
n measurement (mass/volume)Ordered By: Cesario Joaquin on   
2023   
   
                                                    Urea nitrogen   
[Mass/Vol]      10 mg/dL                        9-23            [facility]  
   
                                                    WBC Auto (Bld) [#/Vol]Ordere  
d By: Cesario Joaquin on 2023   
   
                      WBC (Bld) [#/Vol] 9.5 10*3/uL            3.8-11.6   [facility]  
   
                                                    Urinalysis - AUTOMATEDon    
   
                      Appearance (U) Cloudy                           [employer]  
t   
[employer]   
[employer]  
Other Phone:   
[phone]  
   
                      Bilirubin Ql (U) Negative                         [employer]  
[employer]   
[employer]   
[employer]  
Other Phone:   
[phone]  
   
                      Color (U)  Light Yellow                       [place]   
[employer]   
[employer]  
Other Phone:   
[phone]  
   
                      Glucose Ql (U) 250                              [place]  
t   
[employer]   
[employer]  
Other Phone:   
[phone]  
   
                      Hemoglobin Ql (U) Small                            [employer]  
[employer]   
[email or web address]  
Other Phone:   
[phone]  
   
                      Ketones Ql (U) Negative                         [employer] [place]  
t   
[employer]   
[employer]  
Other Phone:   
[phone]  
   
                                                    Leukocyte esterase   
Test strip Ql (U) Trace                                           [facility]   
[employer]   
[employer]  
Other Phone:   
[phone]  
   
                      Nitrite Ql (U) Negative                         [place]  
t   
[employer]   
[employer]  
Other Phone:   
[phone]  
   
                      pH (U)     5.0 [pH]                         [facility]   
[employer]   
[employer]  
Other Phone:   
[phone]  
   
                      Protein Ql (U) Negative                         [place]  
t   
[employer]   
[employer]  
Other Phone:   
[phone]  
   
                                                    Specific gravity (U)   
[Rel density]   1.015                                           [facility]   
[employer]   
[employer]  
Other Phone:   
[phone]  
   
                                                    Urobilinogen (U)   
[Mass/Vol]      0.2 mg/dL                                       [facility]   
[employer]   
[employer]  
Other Phone:   
[phone]  
   
                      Urinalysis - AUTOMATED                                  No  
[employer]  
Other Phone:   
[phone]  
   
                                                    Urine Cultureon 2022   
   
                                                    Bacteria identified Cx   
Nom (U)                                                         [place]   
[employer]   
[employer]  
Other Phone:   
[phone]  
   
                                                    Urine culture routineOrdered  
 By: Pierce Wallace on 2022   
   
                                                    Bacteria identified Cx   
Nom (U)         bacilli - 2 Days                                 [facility]  
   
                                                    MAGNESIUMon 11-   
   
                      Magnesium [Mass/Vol] 2.1 mg/dL  Normal     1.8-2.4    [facility]   
[facility]  
   
                                        Comment on above:   Performed By: #### C  
MP, MG, TSH ####  
[facility]  
[address] [address]  
Dr. Mae Thompson   
   
                                                    PROF 14(COMP METB)on 11-15-2  
022   
   
                      Albumin [Mass/Vol] 3.5 g/dL   Normal     3.4-5.0    [facility]  
   
                                        Comment on above:   Performed By: #### C  
MP, MG, TSH ####  
[facility]  
[address] [address]  
Dr. Mae Thompson   
   
                                                    Albumin/Globulin [Mass   
ratio]          0.8 {ratio}     Normal                          [facility]  
   
                                        Comment on above:   Performed By: #### C  
MP, MG, TSH ####  
[facility]  
[address] [address]  
Dr. Mae Thompson   
   
                                                    ALP [Catalytic   
activity/Vol]   103 U/L         Normal                    [facility]  
   
                                        Comment on above:   Performed By: #### C  
MP, MG, TSH ####  
[facility]  
[address] [address]  
Dr. Mae Thompson   
   
                                                    ALT [Catalytic   
activity/Vol]   13 U/L          Critically low  14-59           [facility]  
   
                                        Comment on above:   Performed By: #### C  
MP, MG, TSH ####  
[facility]  
[address] [place] [address]  
Dr. Mae Thompson   
   
                      Anion gap [Moles/Vol] 9.8 mmol/L Normal                [facility]  
   
                                        Comment on above:   Performed By: #### C  
MP, MG, TSH ####  
[facility]  
[address] [address]  
Dr. Mae Thompson   
   
                                                    AST [Catalytic   
activity/Vol]   39 U/L          Critically high 15-37           [facility]  
   
                                        Comment on above:   Performed By: #### C  
MP, MG, TSH ####  
[facility]  
[address] [address]  
Dr. Mae Thompson   
   
                      Bilirubin [Mass/Vol] 0.5 mg/dL  Normal     0.2-1.0    [facility]  
   
                                        Comment on above:   Performed By: #### C  
MP, MG, TSH ####  
[facility]  
[address] [address]  
Dr. Mae Thompson   
   
                      Calcium [Mass/Vol] 9.7 mg/dL  Normal     8.5-10.1   [facility]  
   
                                        Comment on above:   Performed By: #### C  
MP, MG, TSH ####  
[facility]  
[address] [address]  
Dr. Mae Thompson   
   
                      Chloride [Moles/Vol] 102 mmol/L Normal          [facility]  
   
                                        Comment on above:   Performed By: #### C  
MP, MG, TSH ####  
[facility]  
[address] [address]  
Dr. Mae Thompson   
   
                      CO2 [Moles/Vol] 29.0 mmol/L Normal     21.0-32.0  [facility]  
   
                                        Comment on above:   Performed By: #### C  
MP, MG, TSH ####  
[facility]  
[address] [address]  
Dr. Mae Thompson   
   
                      Creatinine [Mass/Vol] 0.94 mg/dL Normal     0.55-1.02  [facility]  
   
                                        Comment on above:   Performed By: #### C  
MP, MG, TSH ####  
[facility]  
[address] [address]  
Dr. Mae Thompson   
   
                      EGFR-AF AMERICAN >60        Normal     >=60       [facility]  
   
                                        Comment on above:   Performed By: #### C  
MP, MG, TSH ####  
[facility]  
[address] [address]  
Dr. Mae Thompson   
   
                      EGFR-NON AF AMERICAN =60        Normal     >=60       [facility]  
   
                                        Comment on above:   Performed By: #### C  
MP, MG, TSH ####  
[facility]  
[address] [address]  
Dr. Mae Thompson   
   
                                                    Globulin (S)   
[Mass/Vol]      4.5 g/dL        Normal                          [facility]  
   
                                        Comment on above:   Performed By: #### C  
MP, MG, TSH ####  
[facility]  
[address] [address]  
Dr. Mae Thompson   
   
                      Glucose [Mass/Vol] 89 mg/dL   Normal          [facility]  
   
                                        Comment on above:   Performed By: #### C  
MP, MG, TSH ####  
[facility]  
[address] [address]  
Dr. Mae Thompson   
   
                      Potassium [Moles/Vol] 3.8 mmol/L Normal     3.5-5.1    [facility]  
   
                                        Comment on above:   Performed By: #### C  
MP, MG, TSH ####  
[facility]  
[address] [address]  
Dr. Mae Thompson   
   
                      Protein [Mass/Vol] 8.0 g/dL   Normal     6.4-8.2    [facility]  
   
                                        Comment on above:   Performed By: #### C  
MP, MG, TSH ####  
[facility]  
[address] [place] [address]  
Dr. Mae Thompson   
   
                      Sodium [Moles/Vol] 137 mmol/L Normal     136-145    [facility]  
   
                                        Comment on above:   Performed By: #### C  
MP, MG, TSH ####  
[facility]  
[address] [place] [address]  
Dr. Mae Thompson   
   
                                                    Urea nitrogen   
[Mass/Vol]      18.0 mg/dL      Normal          7.0-18.0        [facility]  
   
                                        Comment on above:   Performed By: #### C  
MP, MG, TSH ####  
[facility]  
[address] [place] [address]  
Dr. Mae Thompson   
   
                                                    Urea   
nitrogen/Creatinine   
[Mass ratio]    19.1 mg/mg      Normal                          [facility]  
   
                                        Comment on above:   Performed By: #### C  
MP, MG, TSH ####  
[facility]  
[address] [place] [address]  
Dr. Mae Thompson   
   
                                                    TSHon 11-   
   
                          TSH          1.188 uIU/mL Normal       0.358-3.74  
0                                       [facility]  
   
                                        Comment on above:   Performed By: #### C  
MP, MG, TSH ####  
[facility]  
[address] [place] [address]  
Dr. Mae Thompson   
   
                                                    MAGNESIUMon 2022   
   
                      Magnesium [Mass/Vol] 2.1 mg/dL  Normal     1.8-2.4    [facility]  
   
                                        Comment on above:   Performed By: #### L  
IPID, [id number] ####  
[facility]  
[address] [place] [address]  
Dr. Mae Thompson   
   
                                                    PROF CHEM 8 (BAS METB)on    
   
                      Anion gap [Moles/Vol] 10.7 mmol/L Normal                [facility]  
   
                                        Comment on above:   Performed By: #### L  
IPID, [id number] ####  
[facility]  
[address] [place] [address]  
Dr. Mae Thompson   
   
                      Calcium [Mass/Vol] 9.5 mg/dL  Normal     8.5-10.1   [facility]  
   
                                        Comment on above:   Performed By: #### L  
IPID, [id number] ####  
[facility]  
[address] [address]  
Dr. Mae Thompson   
   
                      Chloride [Moles/Vol] 103 mmol/L Normal          [facility]  
   
                                        Comment on above:   Performed By: #### L  
IPID, [id number] ####  
[facility]  
[address] [address]  
Dr. Mae Thompson   
   
                      CO2 [Moles/Vol] 30.4 mmol/L Normal     21.0-32.0  [facility]  
   
                                        Comment on above:   Performed By: #### L  
IPID, [id number] ####  
[facility]  
[address] [place] [address]  
Dr. Mae Thompson   
   
                      Creatinine [Mass/Vol] 1.13 mg/dL Critically high 0.55-1.02  
  [facility]  
   
                                        Comment on above:   Performed By: #### L  
IPID, [id number] ####  
[facility]  
[address] [address]  
Dr. Mae Thompson   
   
                      EGFR-AF AMERICAN 59 mL/min/1.73m2 Critically low >=60       
  [facility]  
   
                                        Comment on above:   Performed By: #### L  
IPID, [id number] ####  
[facility]  
[address] [place] [address]  
Dr. Mae Thompson   
   
                      EGFR-NON AF AMERICAN 49 mL/min/1.73m2 Critically low >=60   
      [facility]  
   
                                        Comment on above:   Performed By: #### L  
IPID, [id number] ####  
[facility]  
[address] [place] [address]  
Dr. Mae Thompson   
   
                      Glucose [Mass/Vol] 93 mg/dL   Normal          [facility]  
   
                                        Comment on above:   Performed By: #### L  
IPID, [id number] ####  
[facility]  
[address] [place] [address]  
Dr. Mae Thompson   
   
                      Potassium [Moles/Vol] 4.1 mmol/L Normal     3.5-5.1    [facility]  
   
                                        Comment on above:   Performed By: #### L  
IPID, [id number] ####  
[facility]  
[address] [address]  
Dr. Mae Thompson   
   
                      Sodium [Moles/Vol] 140 mmol/L Normal     136-145    [facility]  
   
                                        Comment on above:   Performed By: #### L  
IPID, [id number] ####  
[facility]  
[address] [address]  
Dr[address] Mae Thompson   
   
                                                    Urea nitrogen   
[Mass/Vol]      18.0 mg/dL      Normal          7.0-18.0        [facility]  
   
                                        Comment on above:   Performed By: #### L  
IPID, [id number] ####  
[facility]  
[address] [place], [place] [address]  
Dr. Mae Thompson   
   
                                                    Urea   
nitrogen/Creatinine   
[Mass ratio]    15.9 mg/mg      Normal                          [facility]  
   
                                        Comment on above:   Performed By: #### L  
IPID, [id number] ####  
[facility]  
[address] [address]  
Dr. Mae Thompson   
   
                                                    Tobacco Screening.on   
022   
   
                                                    Adult depression   
screening assessment No                                              [facility]   
[facility]  
y 89 DO  
Work Phone:   
[phone]  
0  
   
                                                    Tobacco use status   
[facility]            b) No                                           [facility]   
[facility]  
y 89 DO  
Work Phone:   
[phone]  
0  
   
                                                    Heart Rateon 2022   
   
                      Heart Rate Regular                          [facility]   
[facility]  
y 250 DO  
Work Phone:   
[phone]  
0  
   
                                                    Office Visit (Cardiology)on   
2022   
   
                                        Follow-up visit     Diagnoses/Problems  
Assessed  
Benign essential   
hypertension (401.1)   
(I10)  
Orders  
Benign essential   
hypertension  
Stop: Furosemide 40 MG   
Oral Tablet  
Persistent atrial   
fibrillation  
Stop: Potassium Chloride   
ER 10 MEQ Oral Tablet   
Extended Release  
Unlinked  
Stop: Potassium Chloride   
10 MEQ TBCR  
Patient Instructions  
By signing my name below,   
I, Evie Long LPN, Scribe, attest that this   
documentation has been   
prepared under the   
direction and in the   
presence of Dr. Tomi Rodgers MD.  
All medical record   
entries made by the   
Scribe were at my   
direction and personally   
dictated by me. I have   
reviewed the chart and   
agree that the record   
accurately reflects my   
personal performance of   
the history, physical   
exam, discussion and   
plan.  
Please bring all   
medicines, vitamins, and   
herbal supplements with   
you when you come to the   
office.  
Prescriptions will not be   
filled unless you are   
compliant with your   
follow up appointments or   
have a follow up   
appointment scheduled as   
per instruction of your   
physician. Refills should   
be requested at the time   
of your visit.  
Keep appointment as   
scheduled  
  
Chief Complaint  
GIACOMO BENAVIDES is being seen   
for hypertension and BP   
Check.  
Patient is in the office   
for hypertension   
management since   
lisinopril 5 mg daily was   
added her blood pressure   
has become under control.   
She does not require   
diuretic therapy and   
potassium which I will   
discontinue.  
  
Active Problems Problems  
Benign essential   
hypertension (401.1)   
(I10)  
Assessed By: Neyda Regalado; Last Assessed: 22   
Mar 2022  
Current Meds  
  
Medication   
NameInstruction  
Albuterol Sulfate (2.5   
MG/3ML) 0.083% Inhalation   
Nebulization SolutionUSE   
AS DIRECTED.  
ALPRAZolam ER 1 MG Oral   
Tablet Extended Release   
24 Hour1 tablet three   
times daily as needed  
Atorvastatin Calcium 40   
MG Oral TabletTAKE 1   
TABLET AT BEDTIME.  
busPIRone HCl - 15 MG   
Oral TabletTAKE 1 TABLET   
TWICE DAILY.  
Clopidogrel Bisulfate 75   
MG Oral TabletTAKE 1   
TABLET BY MOUTH EVERY DAY  
Ergocalciferol 1.25 MG   
(88413 UT) Oral   
CapsuleTAKE 1 CAPSULE   
WEEKLY.  
Furosemide 40 MG Oral   
TabletTAKE ONE TABLET BY   
MOUTH DAILY AT 8 AM  
LaMICtal XR 50 MG Oral   
Tablet Extended Release   
24 HourTake 1 tablet   
daily  
Levothyroxine Sodium 88   
MCG Oral TabletTAKE 1   
TABLET DAILY AS DIRECTED.  
Lisinopril 5 MG Oral   
TabletTAKE 1 TABLET   
DAILY.  
Nitroglycerin 0.4 MG   
Sublingual Tablet   
SublingualPLACE 1 TABLET   
UNDER THE TONGUE EVERY 5   
MINUTES UP TO 3 DOSES AS   
NEEDED FOR CHEST PAIN.  
Pantoprazole Sodium 40 MG   
Oral Tablet Delayed   
ReleaseTAKE 1 TABLET   
DAILY.  
Potassium Chloride 10 MEQ   
TBCRTAKE 1 TABLET 3 TIMES   
DAILY.  
Potassium Chloride ER 10   
MEQ Oral Tablet Extended   
ReleaseTake 1 tablet by   
mouth three times a day  
Sotalol HCl - 120 MG Oral   
TabletTAKE 1/2 TABLET   
TWICE A DAY  
Warfarin Sodium 5 MG Oral   
TabletTAKE 1 TABLET DAILY   
AS DIRECTED by [facility]   
Zoloft 50 MG Oral   
TabletTAKE 1 AND 1/2   
TABLETS DAILY.  
Patient did not bring   
medications list or   
bottles. Updated verbally   
with patient.  
Allergies  
Medication  
Compazine  
Recorded By: Viviana Ring; 2022 7:02:42   
AM  
Vitals  
Vital Signs  
Recorded: 2022   
02:54PMRecorded:   
2022 02:51PM  
Yshunwna260, LUE,   
Bfeudqb518, RUE, Sitting  
Fqpowmysm90, LUE,   
Khywllc52, RUE, Sitting  
Heart Rate56, L Radial  
Pulse QualityRegular, L   
Radial  
Height5 ft 4 in  
Tjakni261 lb  
BMI Jombzikujv81.58 kg/m2  
BSA Calculated1.89  
Signatures  
Electronically signed by   
: Tomi Rodgers MD; Mar   
22 2022 5:17PM EST   
(Author)            Normal                                   [facility]  
   
                                                    Office Visit (Cardiology)on   
02-   
   
                                        Follow-up visit     Diagnoses/Problems  
Assessed  
Persistent atrial   
fibrillation (427.31)   
(I48.19)  
Status post   
radiofrequency ablation   
at   
High risk medication use   
(V58.69) (Z79.899)  
Benign essential   
hypertension (401.1)   
(I10)  
Class 1 obesity with body   
mass index (BMI) of 34.0   
to 34.9 in adult   
(278.00,V85.34)  
(E66.9,Z68.34)  
Dyslipidemia (272.4)   
(E78.5)  
S/P PTCA (percutaneous   
transluminal coronary   
angioplasty) (V45.82)   
(Z98.61)  
LAD 2019  
Orders  
Benign essential   
hypertension  
Start: Lisinopril 5 MG   
Oral Tablet; TAKE 1   
TABLET DAILY  
Benign essential   
hypertension,   
Dyslipidemia, High risk   
medication use,   
Persistent atrial  
fibrillation  
Lipid Panel;   
Status:Active; Requested   
for:[id number];  
Benign essential   
hypertension, High risk   
medication use,   
Persistent atrial   
fibrillation  
ALT - Alanine   
Aminotransferase, Serum;   
Status:Active; Requested   
for:67Oih1447;  
AST; Status:Active;   
Requested for:45Ijw1446;  
Basic Metabolic Panel;   
Status:Active; Requested   
for:88Wge5102;  
Complete Blood Count;   
Status:Active; Requested   
for:64Igq1852;  
SocHx: Never a smoker  
Tobacco Use Screening;   
Status:Complete; Done:   
90Tah1991  
Patient Instructions  
By signing my name below,   
I, Myke Alberto LPNScribe, attest that this   
documentation has been   
prepared under the   
direction and in the   
presence of Dr. Tomi Rodgers MD.  
All medical record   
entries made by the   
Scribe were at my   
direction and personally   
dictated by me. I have   
reviewed the chart and   
agree that the record   
accurately reflects my   
personal performance of   
the history, physical   
exam, discussion and   
plan.  
Please bring all   
medicines, vitamins, and   
herbal supplements with   
you when you come to the   
office.  
Prescriptions will not be   
filled unless you are   
compliant with your   
follow up appointments or   
have a follow up   
appointment scheduled as   
per instruction of your   
physician. Refills should   
be requested at the time   
of your visit.  
Follow up in 6 months  
Blood pressure follow up   
in 3 weeks  
  
Chief Complaint  
GIACOMO BENAVIDES is being seen   
for a 6 month follow-up   
of.  
Patient is in the office   
for follow-up for the   
problems noted below.   
Since her last visit she   
has not had any major   
breakthrough atrial   
fibrillation. She is on   
sotalol 60 mg twice daily   
and Coumadin therapy. She   
continues to smoke half   
pack of cigarettes per   
day something I always   
discouraged her from   
doing. She has no   
symptoms suggestive   
angina pectoris or heart   
failure. Her weight is   
still working progress.   
Her examination is   
unremarkable for obesity   
and mild hypertension.   
EKG confirmed normal   
sinus rhythm and normal   
QTc interval.  
Assessment/recommendation  
s:  
1?coronary artery disease   
single vessel status post   
plasty to the LAD in   
2019. She will   
remain on Plavix along   
with statin therapy,   
there has been no recent   
angina pectoris patient   
remains at risk for   
recurrent disease due to   
active tobacco abuse,   
this was made clear to   
her  
2?paroxysmal atrial   
fibrillation status post   
radiofrequency ablation   
at [facility]   
with recurrent atrial   
fibrillation, presently   
on sotalol 60 mg twice   
daily. ECG confirmed   
normal sinus rhythm   
today. Patient remains on   
Coumadin  
3?hyperlipidemia on   
statin therapy , lipid   
profile is due and was   
requested  
4? obesity encouraged   
more weight control with   
diet and exercise  
5?hypertension on medical   
therapy not completely   
under control, will   
increase lisinopril up to   
5 mg daily and check her   
blood pressure readings   
in few weeks  
6?rheumatoid arthritis on   
methotrexate and   
prednisone followed by   
rheumatology  
7?high-risk medication   
with warfarin and sotalol   
with no complications.  
8?tobacco abuse, was   
counseled again on   
tobacco cessation  
  
  
Current Meds  
Medication   
NameInstruction  
Albuterol Sulfate (2.5   
MG/3ML) 0.083% Inhalation   
Nebulization SolutionUSE   
AS DIRECTED.  
ALPRAZolam ER 1 MG Oral   
Tablet Extended Release   
24 Hour1 tablet three   
times daily as needed  
Atorvastatin Calcium 40   
MG Oral TabletTAKE 1   
TABLET AT BEDTIME.  
busPIRone HCl - 15 MG   
Oral TabletTAKE 1 TABLET   
TWICE DAILY.  
Clopidogrel Bisulfate 75   
MG Oral TabletTAKE 1   
TABLET BY MOUTH EVERY DAY  
Ergocalciferol 1.25 MG   
(61291 UT) Oral   
CapsuleTAKE 1 CAPSULE   
WEEKLY.  
Furosemide 40 MG Oral   
TabletTAKE 1 TABLET   
DAILY.  
LaMICtal XR 50 MG Oral   
Tablet Extended Release   
24 HourTake 1 tablet   
daily  
Levothyroxine Sodium 88   
MCG Oral TabletTAKE 1   
TABLET DAILY AS DIRECTED.  
Lisinopril 2.5 MG Oral   
TabletTAKE 1 TABLET BY   
MOUTH EVERY DAY  
Nitroglycerin 0.4 MG   
Sublingual Tablet   
SublingualPLACE 1 TABLET   
UNDER THE TONGUE EVERY 5   
MINUTES UP TO 3 DOSES AS   
NEEDED FOR CHEST PAIN.  
Pantoprazole Sodium 40 MG   
Oral Tablet Delayed   
ReleaseTAKE 1 TABLET   
DAILY.  
Potassium Chloride 10 MEQ   
TBCRTAKE 1 TABLET 3 TIMES   
DAILY.  
Sotalol HCl - 120 MG Oral   
Tablet1/2 tablet twice a   
day  
Warfarin Sodium 5 MG Oral   
TabletTAKE 1 TABLET DAILY   
AS DIRECTED by [facility]  
Zoloft 50 MG Oral   
TabletTAKE 1 AND 1/2   
TABLETS DAILY.  
Allergies  
Medication  
Compazine  
Recorded By: Viviana Ring; 2022 7:02:42   
(more content not   
included)...        Normal                                  [facility]  
   
                                                    Tobacco Screening.on 02-10-2  
022   
   
                                                    Tobacco use status   
[facility]            b) No                                           [facility]   
[facility]  
y 250 DO  
Work Phone:   
[phone]  
0  
   
                                                    Daily Progress Note-Electrop  
hysiologyon 2020   
   
                                                    Daily Progress   
Note-Electrophysiology                  Service:   
Electrophysiology  
  
Subjective Data:  
GIACOMO BENAVIEDS is a 59   
year old Female who is   
Hospital Day # 2.  
  
Additional Information:  
-Pt was examined at   
9:45AM and tele reviewed  
-Pt was seen by EP   
fellowJuan Diego Ty MD this   
AM also  
-denies   
CP/dyspnea/LH/palps;   
cannot feel that she is   
in AFL  
-pt is NPO for DCCV today   
per Dr Mullins  
  
  
Objective Data:  
  
Objective Information:  
T PRBPSpO2  
[id number]/8697%  
Date/Time 12:   
13: 13:   
13: 13:00  
Range(36.2C - 36.8C ) (75   
- 98 ) (11 - 26 ) (85 -   
115 )/ (48 - 86 ) (89%  
- 99% )  
Pain reported at    
12:00: 0 = None  
  
Tele reviewed: AFL 80s  
  
EKG 20 8:45AM   
reviewed by EP fellow   
also: atyp AFL 80s, no   
acute changes  
  
Physical Exam  
  
Constitutional: A+O x 3,   
no distress  
Eyes: Anicteric  
Head/Neck: NC/AT  
Respiratory/Thorax: CTAB  
Cardiovascular: irreg, no   
rub  
Gastrointestinal: soft,   
NT/ND +BS  
Extremities: no LE edema  
  
RFV access site-no   
bleeding, hematoma, or   
ecchymosis  
Psychological:   
Appropriate speech and   
affect.  
Skin: Warm and dry  
  
Medication  
  
Medications:  
  
Continuous Medications  
-------------------------  
-------  
No continuous medications   
are active  
  
Scheduled Medications  
-------------------------  
-------  
  
1. Aspirin Chewable: 81   
mg Oral Daily  
2. busPIRone (BUSPAR): 15   
mg Oral Every 12 Hours  
3. Clopidogrel: 75 mg   
Oral Daily  
4. dilTIAZem (CARDIZEM   
CD) Extended Release (24   
hour): 180 mg Oral Every   
24  
Hours  
5. Levothyroxine: 88   
microgram(s) Oral Daily  
6. Lisinopril: 5 mg Oral   
Daily  
7. Ophthalmic Ointment: 1   
application(s) Right Eye   
Every 12 Hours  
8. Pantoprazole: 40 mg   
Oral Daily  
9. predniSONE: 2.5 mg   
Oral Every 24 Hours  
10. Warfarin: 5 mg Oral   
Daily  
  
PRN Medications  
-------------------------  
-------  
  
1. Acetaminophen: 650 mg   
Oral Every 6 Hours  
2. ALPRAZolam: 0.5 mg   
Oral Every 8 Hours  
3. Morphine Injectable: 1   
mg IntraVenous Push Every   
4 Hours  
4. Ondansetron   
Injectable: 4 mg   
IntraVenous Push Every 8   
Hours  
5. Zolpidem: 5 mg Oral At   
Bedtime  
  
  
Recent Lab Results  
  
Results:  
  
  
I have reviewed these   
laboratory results:  
  
PT + INR, Plasma   
2020 11:23:00  
  
ResultValue  
Prothrombin Time, Plasma   
28.5 H  
International Normalized   
Ratio, Plasma 2.5 H  
  
  
Assessment and Plan:  
Assessment:  
58yo F with HTN, HLD,   
CAD/PCI LAD 2019, CVA   
, RA on Pred, and AF   
on  
Coumadin (managed by   
[facility] coumadin   
clinic). Follows with   
cards Dr Rodgers. Underwent PVI   
and atypical AFL ablation   
yesterday but went back   
into  
AFL last night. Per   
Fellow, POCT INR   
yesterday was 1.9;   
Lovenox 1mg/kg x 1  
given per Dr Mullins while   
awaiting stat INR, which   
is 2.5 today. Awaiting   
DCCV  
then will be dc'd home.  
  
*DCCV unsuccessful x 2   
shocks. Pt really wants   
to go home today. Per Dr Mullins, dc diltiazem ER   
and start Amio 400mg 3x/d   
x 3 days, then 200mg   
daily  
(mini-load). Pt to get   
EKG in Dr Rodgers's   
office in 1 week (says   
she has appt  
next week)-->if still in   
AFL, can get DCCV   
locally. Pt was advised   
on  
coumadin/AMio interaction   
and that she would likely   
need less coumadin. Pt  
agrees to get INR on 20.  
  
-MED changes: Cont   
Protonix daily as home,   
Dc dilt, Amio as   
above(called in Rx)  
-post procedure written   
instructions reviewed   
with pt and all questions  
answered  
-Pt was advised on   
importance of adherence   
to Coumadin regimen and   
rec to have  
minimum weekly INR x 4   
weeks then interval as   
determined by Coumadin   
clinic  
-90d intermittent EM   
given to pt by [facility]   
tech who instructed pt on   
use  
-F/u with cards Dr Rodgers 1 month (pt to   
call for appt)  
-F/w Dr Mullins 20 in   
[place] [address] office  
  
GRANT Robins PA-C, [facility]  
Cardiac Electrophysiology  
  
  
  
  
Electronic Signatures for   
Addendum Section:  
Myke Matthews (PAC)   
(Signed Addendum   
2020 16:21)  
Greater than 30 min spent   
on coordination of   
discharge.  
  
Electronic Signatures:  
Myke Matthews (PAC)   
(Signed 2020   
16:21)  
Authored: Service,   
Subjective Data,   
Objective Data,   
Assessment and Plan,  
Signature/Cosignature/Att  
estation  
  
  
Last Updated: 2020   
16:21 by Myke Matthews   
(PAC)               Normal                                  [facility]  
   
                                                    Discharge Planning Mksu0ed 0  
2020   
   
                                                    Discharge Planning   
Note2                                   Discharge Planning:  
Anticipated Discharge   
[id number]  
Discharge Planning  
20 16:45 Pt given   
discharge instructions.   
Prescription sent to   
pharmacy.  
Pt and pt's daughter   
verbalized understanding   
of discharge   
instructions.  
peripheral IV removed and   
pt removed from tele.   
right groin site   
dry/intact  
with no hematoma. No   
other discharge needs at   
this time- Nieves Trinh RN  
  
Assessment:  
Discharge Planning   
Assessment   
[id number]  
Stated Reason for   
Admissionprocedure (1)  
Arrived Fromhome (1)  
Lives Withalone(1)  
Living   
Arrangementshouse(1)  
Resource/Environmental   
Concernsnone(1)  
Anticipated Transition   
[place](1)  
Services Anticipated at   
Transitionnon(1)  
  
  
  
Electronic Signatures:  
Nieves Trinh (WINSTON)   
(Signed 2020   
16:54)  
Authored: Discharge   
Planning Note2  
  
  
Last Updated: 2020   
16:54 by Nieves Trinh   
(WINSTON)  
  
References:  
1. Data Referenced From   
 Patient Profile - Adult   
v2  2020 08:21 Normal                                  [facility]  
   
                                                    PT/INRon 2020   
   
                                                    INR Coag (PPP)   
[Relative time] 2.5 {INR}       High            0.9 - 1.1       [facility]  
   
                                        Comment on above:   Performed By: #### P  
TINR ####  
[facility]  
[id number]  
[place] [address]   
   
                      PT Coag (PPP) [Time] 28.5 s     High       9.7 - 12.7 [facility]  
   
                                        Comment on above:   Performed By: #### P  
TINR ####  
[facility]  
[id number]  
[place] [address]   
   
                                                    ACT-HIGH RANGEon 2020   
   
                      ACT-HIGH RANGE 221 SECONDS High       96 - 152   [facility]  
   
                                        Comment on above:   Result Comment: Note  
 new reference range as of 2019.  
Target ACT range will vary based on the patient population,  
clinical status, and surgical intervention occurring.   
   
                                                            Performed By: #### A  
CTP ####  
[facility]  
[address]  
[place] [address]   
   
                      ACT-HIGH RANGE 352 SECONDS High       96 - 152   [facility]  
   
                                        Comment on above:   Result Comment: Note  
 new reference range as of 2019.  
Target ACT range will vary based on the patient population,  
clinical status, and surgical intervention occurring.   
   
                                                            Performed By: #### A  
CTP ####  
[facility]  
[address]  
[place] [address]   
   
                      ACT-HIGH RANGE 360 SECONDS High       96 - 152   [facility]  
   
                                        Comment on above:   Result Comment: Note  
 new reference range as of 2019.  
Target ACT range will vary based on the patient population,  
clinical status, and surgical intervention occurring.   
   
                                                            Performed By: #### A  
CTP ####  
[facility]  
[address] [place]  
[place] [address]   
   
                      ACT-HIGH RANGE 351 SECONDS High       96 - 152   [facility]  
   
                                        Comment on above:   Result Comment: Note  
 new reference range as of 2019.  
Target ACT range will vary based on the patient population,  
clinical status, and surgical intervention occurring.   
   
                                                            Performed By: #### A  
CTP ####  
[facility]  
[address]  
[place] [address]   
   
                      ACT-HIGH RANGE 325 SECONDS High       96 - 152   [facility]  
   
                                        Comment on above:   Result Comment: Note  
 new reference range as of 2019.  
Target ACT range will vary based on the patient population,  
clinical status, and surgical intervention occurring.   
   
                                                            Performed By: #### A  
CTP ####  
[facility]  
[address]  
[place] [address]   
   
                      ACT-HIGH RANGE 286 SECONDS High       96 - 152   [facility]  
   
                                        Comment on above:   Result Comment: Note  
 new reference range as of 2019.  
Target ACT range will vary based on the patient population,  
clinical status, and surgical intervention occurring.   
   
                                                            Performed By: #### A  
CTP ####  
[facility]  
[address]  
[place] [address]   
   
                      ACT-HIGH RANGE 288 SECONDS High       96 - 152   [facility]  
   
                                        Comment on above:   Result Comment: Note  
 new reference range as of 2019.  
Target ACT range will vary based on the patient population,  
clinical status, and surgical intervention occurring.   
   
                                                            Performed By: #### A  
CTP ####  
[facility]  
[address] [place]  
[place] [address]   
   
                                                    Admission Risk Screen - Adul  
ton 2020   
   
                                                    Admission Risk Screen   
- Adult                                 Allergies:  
Allergies:  
Compazine: Unknown  
  
Patient Verification:  
New W ID Band Applied in   
my Departmentyes  
Patient Identity Verified   
Bypatient  
ID Band FULL Name,   
include Middle, spelling   
matches patient's ID used   
for  
verificationyes  
ID Band  Matches   
Patient ID used for   
Verficationyes  
ID Band MRN Matches EMR   
MRNyes  
  
Advance Directive:  
Advance Directive/DNRyes  
Advance Directive   
typeLiving Will, Durable   
Power of  for   
Healthcare  
Living Will   
AvailabilityLiving Will   
not available now  
Living Will   
[id number]  
Durable Power of    
AvailabilityDPOA not   
available now  
Durable Power of    
[id number]  
Durable Power of    
contact (name and   
number)pt doesn't know  
  
Falls Screen:  
Type of   
Assessmentadmission  
Moderate Risk   
Factorsnursing judgment   
(specify)  
High Risk Factorsnursing   
judgment (specify)  
Risk for Injury   
Associated with   
Fallcoagulation blood   
thinners (Coumadin,  
heparin gtt),   
coagulopathy  
Fall Risk Conclusionhigh   
falls risk with risk for   
associated injury  
Universal Safety   
InterventionsWDL *orient   
to call system *instruct   
to call for  
assistance before getting   
out of bed *non-slip   
footwear when patient is   
out of  
bed *call bell in reach   
*personal items and   
telephone in reach   
*physically safe  
environment (no spills or   
clutter) *bed in lowest   
position with wheels   
locked  
*appropriate side rails   
in place *room/bathroom   
lighting operational,   
light  
cord in reach   
*appropriate signage on   
door  
Fall and Injury Risk   
Interventionssupervised   
toileting (mandatory for   
all high  
risk patients), exit   
(bed/chair) alarms   
(mandatory for all high   
risk patients),  
educate pt/family,   
monitor med side effects,   
consult Pharmacy,   
individualized  
toileting schedule,   
nursing judgement   
(specify), educate   
patient/family for  
risk for injury   
(fractures and bleeding)  
  
  
Family Violence Screen:  
Are you or have you been   
threatened or abused   
physically, emotionally,   
or  
sexually by anyoneno  
Do you feel UNSAFE going   
back to the place where   
you are livingno  
Clinical assessment: Are   
there any apparent signs   
of injuries/behaviors   
that  
could be related to   
abuse/neglectno  
Social Service Consult   
for abuse/neglect needed   
this visitno  
  
Functional Screen:  
Functional Screen: In the   
recent/past 2-4 weeks,   
patient or family have  
noticedno issues that   
require a speech/language   
consult at this time  
  
AM-PAC- Basic   
Mobility/Daily Activity:  
Patient baseline   
bedboundno  
  
Learning Assessment   
(Patient):  
Patient is Able to be   
Assessed for Learningyes  
Factors Influencing   
Readiness to Learnpain  
Factors that Impact   
Ability to Learnnone  
Devices/Methods Used to   
Communicatenone  
Learning   
Preferencesindividual   
instruction; verbal   
instruction  
Cultural   
Considerationsnone  
Developmental   
Considerationsnone  
Mandaen   
Considerationsnone  
  
Learning Assessment   
(Other Learner):  
Other learner availableno  
  
Suicide/Depression   
Screen:  
During the past month,   
have you often been   
bothered by feeling down,  
depressed or hopelessno  
During the past month,   
have you often had little   
interest or pleasure in  
doing thingsno  
Have you had any thoughts   
of harming yourselfno  
Have you had any thoughts   
of harming anyone elseno  
  
Adult Nutrition Screen:  
Have you recently lost   
weight without tryingno  
Have you been eating   
poorly because of a   
decreased appetiteno  
Malnutrition Screening   
Tool Score0  
Malnutrition Screening   
Tool RiskMST = 0 or 1 Not   
at risk. Eating well with  
little or no weight loss  
Nutrition Consult needed   
this visitno  
Can Patient Participate   
in Room Serviceyes  
Patient requires Paper   
Dishes/Plastic Utensilsno  
  
Pain Screen:  
Pain ScaleCognitively   
Impaired Pain Assessment   
Tool  
Pain Scale   
Educationteaching   
provided  
Current Pain Level0 =   
None  
Acceptable Pain Level0 =   
None  
Expression of Pain   
(nonverbal)none  
Chronic Painyes pt has RA  
  
Spiritual Screen:  
Are there any cultural,   
spiritual, Evangelical   
practices/values/needs   
that are  
important for us to   
knowno  
  
CAGE:  
Is this an injured   
patient at a Trauma   
Center   
([facility]/[place]/  
[place]): no  
  
Vaccinations:  
Vaccination - Influenza   
Vaccination Screen:  
Is it flu season (between   
 and )Yes  
Screening for identified   
contraindications to   
influenza vaccination  
patient/caregiver refusal  
  
Vaccination - Pneumonia   
Vaccination Screen:  
Patient has received a   
previous pneumonia   
vaccine:yes  
  
Nima:  
Skin - Nima Scale:  
  
Nima: Sensory   
Perception (response to   
environment)(4) no   
impairment  
Nima: Moisture (degree   
skin exposed to   
moisture)(4) rarely moist  
Nima: Activity (ability   
to walk)(2) chairfast  
Nima: Mobility   
(amount/control of body   
movement)(3) slightly   
limited  
Nima: Nutrition   
(quality of food   
intake)(3) adequate  
Nima: Friction and   
Shear(3) no apparent   
problem  
Nima: Score19  
  
Significant Indicatiors:  
Significant Indicators:   
Complete  
  
Pressure Injury:  
Pressure Injury Present   
on Admissionno  
  
  
  
Electronic Signatures:  
Nieves Trinh (WINSTON)   
(Signed 2020   
08:20)  
Authored: Admission Risk   
Screens, Vaccinations,   
Nima, Pressure Injury  
  
  
Last Updated: 2020   
08:20 by Nieves Trinh   
(WINSTON)                Normal                                  [facility]  
   
                                                    History and Physicalon    
   
                                        History and Physical History of Present   
Illness:  
HPI:  
Giacomo Benavides is a 60 y/o   
female referred by Dr Rodgers for evaluation of   
AF.  
PMH includes HTN, CVA   
( w/ residual visual   
issues), CAD s/p PCI LAD  
(2019), HLD, mild   
obesity, RA,   
rhabdomyolysis and PAF   
(diagnosed 2019).  
Treatment of her AF   
includes AADs (Sotalol &   
Multaq - prolonged QT ,   
DAUGHTER  
NOT QUITE SURE ABOUT   
THAT). Pt is asymptomatic   
while in AF. Pt is   
currently  
managed on Diltiazem and   
Coumadin. Pts INRs are   
managed by [facility]  
Coumadin clinic. She is   
currently having her   
INR's checked monthly.   
Echo  
2019: LV size and   
thickness are normal, EF   
55-60%, LA mildly   
dilated, mild  
MR, & TR WE DISCUSSED THE   
RISKS AND BENEFITS OF   
AADs vs. CATHETER   
ABLATION FOR  
PERSISTENT AF. MRs. BENAVIDES   
AND HER DAUGHTER   
UNDERSTAND AND WANT TO   
PROCEED WITH  
ABLATION.  
  
  
Comorbidities:  
Comorbid Conditionsatrial   
fibrillation  
Type of Atrial   
FibrillationPermanent   
(chronic)  
  
Family History:  
Family History: reviewed   
and not pertinent to   
presenting problem  
  
Social History:  
Social History:  
Smoking Statusnever   
smoker  
  
  
  
Allergies:  
Compazine: Unknown  
  
Medications Prior to   
Admission:  
Outpatient Meds have not   
been reviewed.  
  
Review of Systems:  
Constitutional: NEGATIVE:   
Fever, Chills, Anorexia,   
Weight Loss, Malaise  
  
Eyes: NEGATIVE: Blurry   
Vision, Drainage,   
Diploplia, Redness,   
Vision Loss/  
Change  
  
ENMT: NEGATIVE: Nasal   
Discharge, Nasal   
Congestion, Ear Pain,   
Mouth Pain, Throat  
Pain  
  
Respiratory: NEGATIVE:   
Dry Cough, Productive   
Cough, Hemoptysis,   
Wheezing,  
Shortness of Breath  
  
Cardiac: POSITIVE:   
Dyspnea on Exertion;   
NEGATIVE: Chest Pain,   
Orthopnea,  
Palpitations, Syncope  
  
Gastrointestinal:   
NEGATIVE: Nausea,   
Vomiting, Diarrhea,   
Constipation, Abdominal  
Pain  
  
Genitourinary: NEGATIVE:   
Discharge, Dysuria, Flank   
Pain, Frequency,   
Hematuria  
  
Musculoskeletal:   
NEGATIVE: Decreased ROM,   
Pain, Swelling,   
Stiffness, Weakness  
  
Endocrine: NEGATIVE: Heat   
Intolerance, Cold   
Intolerance, Sweat,   
Polyuria,  
Thirst  
  
Hematologic/Lymph:   
NEGATIVE: Anemia,   
Bruising, Easy Bleeding,   
Night Sweats,  
Petechiae  
  
Allergic/Immunologic:   
NEGATIVE: Anaphylaxis,   
Itchy/ Teary Eyes,   
Itching,  
Sneezing, Swelling  
  
  
Objective:  
Physical Exam:  
  
Constitutional: Awake,   
alert, oriented x3  
Head/Neck: No JVD  
Respiratory/Thorax: CTA   
bilaterally  
Cardiovascular: RRR.  
Gastrointestinal: Soft.   
Nontender. No guarding or   
rebound.  
Musculoskeletal: Moves   
all extremities   
spontaneously.  
Extremities: Warm. No   
edema  
Neurological: Alert and   
oriented x 3.  
  
Assessment and Plan:  
Assessment:  
Giacomo Benavides is a 60 y/o   
female referred by Dr Rodgers for evaluation of   
AF.  
PMH includes HTN, CVA   
( w/ residual visual   
issues), CAD s/p PCI LAD  
(2019), HLD, mild   
obesity, RA,   
rhabdomyolysis and PAF   
(diagnosed 2019).  
Treatment of her AF   
includes AADs (Sotalol &   
Multaq - prolonged QT ,   
DAUGHTER  
NOT QUITE SURE ABOUT   
THAT). Pt is asymptomatic   
while in AF. Pt is   
currently  
managed on Diltiazem and   
Coumadin. Pts INRs are   
managed by [facility]  
Coumadin clinic. She is   
currently having her   
INR's checked monthly.   
Echo  
2019: LV size and   
thickness are normal, EF   
55-60%, LA mildly   
dilated, mild  
MR, & TR WE DISCUSSED THE   
RISKS AND BENEFITS OF   
AADs vs. CATHETER   
ABLATION FOR  
PERSISTENT AF. MRs. BENAVIDES   
AND HER DAUGHTER   
UNDERSTAND AND WANT TO   
PROCEED WITH  
ABLATION.  
  
  
Signatures/Attestation/Ce  
rtification:  
Note Completion:  
I am a: Resident/Fellow  
Attending AttestationI   
saw and evaluated the   
patient. I personally   
obtained  
the key and critical   
portions of the history   
and physical exam or was  
physically present for   
key and critical portions   
performed by the  
resident/fellow. I   
reviewed the   
resident/fellows   
documentation and   
discussed  
the patient with the   
resident/fellow. I agree   
with the resident/fellows  
medical decision making   
as documented in the   
note.  
I personally evaluated   
the patient [id number]  
Attending Provider   
Inpatient Certification   
StatementI certify this   
patients  
need for inpatient care   
based on the above   
documentation including;   
the order  
to admit as inpatient,   
the anticipated length of   
stay, diagnosis, problem   
list  
and plan of care, and   
discharge plan.  
  
  
  
Electronic Signatures:  
Rich Mullins)   
(Signed 12-Mar-2020   
07:48)  
Authored: Medications   
Prior to Admission,   
Signatures/Attestation/Ce  
rtification  
Co-Signer: History of   
Present Illness,   
Comorbidities, Family   
History, Social  
History, Allergies,   
Medications Prior to   
Admission, Review of   
Systems,  
Objective, Assessment and   
Plan,   
Signatures/Attestation/Ce  
rtification  
Juan Diego Ty (Fellow))   
(Signed 2020   
07:56)  
Authored: History of   
Present Illness,   
Comorbidities, Family   
History, Social  
History, Allergies,   
Medications Prior to   
Admission, Review of   
Systems,  
Objective, Assessment and   
Plan,   
Signatures/Attestation/Ce  
rtification  
  
  
Last Updated: 12-Mar-2020   
07:48 by Rich Mullins)                [facility]  
   
                                                    Patient Profile - Adult v2on  
 2020   
   
                                                    Patient Profile -   
Adult v2                                Profile:  
Initial Info:  
How to be AddressedDonna  
Spoken Language   
PreferredEnglish  
Source of   
Informationpatient  
Are you currently using   
the Personal Electronic   
Health Record or   
[facility]no  
Are you interested in   
learning more about   
[facility] for [facility]   
management of your  
healthnot at this time  
Stated Reason for   
Admissionprocedure  
Wants Family/Rep Notified   
of Admissionno  
Notify PCPdo not notify   
PCP  
Informed of Patient   
Visiting Rightsyes  
[place]  
Patient Belongingsremains   
with patient  
Patient Belongings   
Remaining with   
Patientclothing  
Medications Brought to   
Hospitalno  
  
General Health:  
Weight in kg86.1   
kilogram(s)  
Weight in put132 pound(s)  
Height in feet5 feet  
Height in inches5   
inch(es)  
Height in cm165.1   
centimeter(s)  
BMI (kg/m2)31.587 square   
meter  
Weight Methodstated  
Scale Typebed  
Height Methodstated  
  
[facility] Based Care:  
How would you like to   
participate in your   
carept denies  
What is the number one   
concern for you during   
this hospitalizationpt   
denies  
What is the most   
important thing we can do   
to support you during   
this  
hospitalizationpt denies  
Is there anything we need   
to know to best care for   
youpt denies  
  
Substance:  
Current or Former   
Substance Use never:   
Cigarette/Tobacco,   
e-Cigarette/Vaping,  
Alcohol, Street Drugs  
  
  
Health Mgmt:  
Symptoms/Conditions   
Managed at   
Homecardiovascular; none  
Cardiovascular   
Managementmanaged  
Barriers to Managing   
Healthnone  
  
Relationship/Environ:  
Primary Source of   
Support/Comfortchild(clint)  
Lives Withalone  
Living Arrangementshouse  
Resource/Environmental   
Concernsnone  
Anticipated Transition   
[place]  
Services Anticipated at   
Transitionnone  
Significant   
IndicatorsComplete  
  
  
  
Information Review:  
Allergies, Home Meds and   
Significant Events have   
been Reviewed and   
Verified  
with Patient/Familyyes  
  
ALLERGY, INTOLERANCE,   
ADVERSE EVENT:  
Allergies:  
Compazine: Drug, Unknown,   
Active  
  
  
Electronic Signatures:  
Nieves Trinh)   
(Signed 2020   
08:24)  
Authored: Profile,   
Additional Information  
  
  
Last Updated: 2020   
08:24 by Trinh, Nieves   
(CN)                [facility]   
[facility]   
[facility]  
  
  
  
Vital Signs  
  
  
                          Date Time    Vital Sign   Value        Performing   
Clinician                               Facility  
   
                                                    2023   
14:          Body height         163.83 cm           Cesario Piedra  
Other Phone:   
[phone]                           [facility]  
Other Phone:   
[phone]  
   
                                                    2023   
14:                              Body mass index   
(BMI) [Ratio]             23.32 kg/m2               Cesario Piedra  
Other Phone:   
[phone]                           [facility]  
Other Phone:   
[phone]  
   
                                                    2023   
14:          Body temperature    98.2 [degF]         Cesario Piedra  
Other Phone:   
[phone]                           [facility]  
Other Phone:   
[phone]  
   
                                                    2023   
14:          Body weight         62.6 kg             Cesario Piedra  
Other Phone:   
[phone]                           [facility]  
Other Phone:   
[phone]  
   
                                                    2023   
14:                              Diastolic blood   
pressure                  60 mm[Hg]                 Cesario Piedra  
Other Phone:   
[phone]                           [facility]  
Other Phone:   
[phone]  
   
                                                    2023   
14:                              SaO2% (BldA) [Mass   
fraction]                 97 %                      Cesario Piedra  
Other Phone:   
[phone]                           [facility]  
Other Phone:   
[phone]  
   
                                                    2023   
14:                              Systolic blood   
pressure                  90 mm[Hg]                 Cesario Piedra  
Other Phone:   
[phone]                           [facility]  
Other Phone:   
[phone]  
   
                                                    2023   
15:          Body height         163.83 cm           Cesario Piedra  
Other Phone:   
[phone]                           [facility]  
Other Phone:   
[phone]  
   
                                                    2023   
15:                              Body mass index   
(BMI) [Ratio]             26.53 kg/m2               Cesario Piedra  
Other Phone:   
[phone]                           [facility]  
Other Phone:   
[phone]  
   
                                                    2023   
15:          Body temperature    97.7 [degF]         Cesario Piedra  
Other Phone:   
[phone]                           [facility]  
Other Phone:   
[phone]  
   
                                                    2023   
15:          Body weight         71.22 kg            Cesario Dorothea  
Other Phone:   
[phone]                           [facility]  
Other Phone:   
[phone]  
   
                                                    2023   
15:                              Diastolic blood   
pressure                  60 mm[Hg]                 Cesario Piedra  
Other Phone:   
[phone]                           [facility]  
Other Phone:   
[phone]  
   
                                                    2023   
15:          Respiratory rate    18 /min             Cesario Dorothea  
Other Phone:   
[phone]                           [facility]  
Other Phone:   
[phone]  
   
                                                    2023   
15:                              SaO2% (BldA) [Mass   
fraction]                 97 %                      Cesario Dorothea  
Other Phone:   
[phone]                           [facility]  
Other Phone:   
[phone]  
   
                                                    2023   
15:                              Systolic blood   
pressure                  96 mm[Hg]                 Cesario Piedra  
Other Phone:   
[phone]                           [facility]  
Other Phone:   
[phone]  
   
                                                    05-   
16:          Body height         163.83 cm           Cesario Dorothea  
Other Phone:   
[phone]                           [facility]  
Other Phone:   
[phone]  
   
                                                    05-   
16:                              Body mass index   
(BMI) [Ratio]             26.26 kg/m2               Cesario Piedra  
Other Phone:   
[phone]                           [facility]  
Other Phone:   
[phone]  
   
                                                    05-   
16:          Body temperature    97.4 [degF]         Cesario Piedra  
Other Phone:   
[phone]                           [facility]  
Other Phone:   
[phone]  
   
                                                    05-   
16:          Body weight         70.49 kg            Cesario Piedra  
Other Phone:   
[phone]                           [facility]  
Other Phone:   
[phone]  
   
                                                    05-   
16:                              Diastolic blood   
pressure                  74 mm[Hg]                 Cesario Dorothea  
Other Phone:   
[phone]                           [facility]  
Other Phone:   
[phone]  
   
                                                    05-   
16:          Respiratory rate    18 /min             Cesario Pinkmer  
Other Phone:   
[phone]                           [facility]  
Other Phone:   
[phone]  
   
                                                    05-   
16:                              SaO2% (BldA) [Mass   
fraction]                 95 %                      Cesario Pinkmer  
Other Phone:   
[phone]                           [facility]  
Other Phone:   
[phone]  
   
                                                    05-   
16:                              Systolic blood   
pressure                  104 mm[Hg]                Cesario Dorothea  
Other Phone:   
[phone]                           [facility]  
Other Phone:   
[phone]  
   
                                                    2022   
16:          Body height         163.83 cm           Pierce Wallace  
Other Phone:   
[phone]                           [facility]  
Other Phone:   
[phone]  
   
                                                    2022   
16:                              Body mass index   
(BMI) [Ratio]             30.37 kg/m2               Peirce Wallace  
Other Phone:   
[phone]                           [facility]  
Other Phone:   
[phone]  
   
                                                    2022   
16:          Body temperature    98.1 [degF]         Pierce Wallace  
Other Phone:   
[phone]                           [facility]  
Other Phone:   
[phone]  
   
                                                    2022   
16:          Body weight         81.51 kg            Pierce Wallace  
Other Phone:   
[phone]                           [facility]  
Other Phone:   
[phone]  
   
                                                    2022   
16:                              Diastolic blood   
pressure                  84 mm[Hg]                 Pierce Wallace  
Other Phone:   
[phone]                           [facility]  
Other Phone:   
[phone]  
   
                                                    2022   
16:          Respiratory rate    18 /min             Pierce Wallace  
Other Phone:   
[phone]                           [facility]  
Other Phone:   
[phone]  
   
                                                    2022   
16:                              SaO2% (BldA) [Mass   
fraction]                 98 %                      Pierce Wallace  
Other Phone:   
[phone]                           [facility]  
Other Phone:   
[phone]  
   
                                                    2022   
16:                              Systolic blood   
pressure                  122 mm[Hg]                Pierce Wallace  
Other Phone:   
[phone]                           [facility]  
Other Phone:   
[phone]  
   
                                                    2022   
16:          Body height         163.83 cm           Cesario Piedra  
Other Phone:   
[phone]                           [facility]  
Other Phone:   
[phone]  
   
                                                    2022   
16:                              Body mass index   
(BMI) [Ratio]             31.21 kg/m2               Cesario Piedra  
Other Phone:   
[phone]                           [facility]  
Other Phone:   
[phone]  
   
                                                    2022   
16:          Body weight         83.78 kg            Cesario Piedra  
Other Phone:   
[phone]                           [facility]  
Other Phone:   
[phone]  
   
                                                    2022   
16:                              Diastolic blood   
pressure                  84 mm[Hg]                 Cesario Piedra  
Other Phone:   
[phone]                           [facility]  
Other Phone:   
[phone]  
   
                                                    2022   
16:          Respiratory rate    18 /min             Cesario Piedra  
Other Phone:   
[phone]                           [facility]  
Other Phone:   
[phone]  
   
                                                    2022   
16:                              SaO2% (BldA) [Mass   
fraction]                 97 %                      Cesario Piedra  
Other Phone:   
[phone]                           [facility]  
Other Phone:   
[phone]  
   
                                                    2022   
16:                              Systolic blood   
pressure                  104 mm[Hg]                Cesario Piedra  
Other Phone:   
[phone]                           [facility]  
Other Phone:   
[phone]  
   
                                                    2022   
16:          Body height         163.83 cm           Cesario Piedra  
Other Phone:   
[phone]                           [facility]  
Other Phone:   
[phone]  
   
                                                    2022   
16:                              Body mass index   
(BMI) [Ratio]             30.47 kg/m2               Cesario Piedra  
Other Phone:   
[phone]                           [facility]  
Other Phone:   
[phone]  
   
                                                    2022   
16:          Body weight         81.78 kg            Cesario Piedra  
Other Phone:   
[phone]                           [facility]  
Other Phone:   
[phone]  
   
                                                    2022   
16:                              Diastolic blood   
pressure                  60 mm[Hg]                 Cesario Piedra  
Other Phone:   
[phone]                           [facility]  
Other Phone:   
[phone]  
   
                                                    2022   
16:          Respiratory rate    18 /min             Cesario Piedra  
Other Phone:   
[phone]                           [facility]  
Other Phone:   
[phone]  
   
                                                    2022   
16:                              SaO2% (BldA) [Mass   
fraction]                 95 %                      Cesario Piedra  
Other Phone:   
[phone]                           [facility]   
[employer]   
[employer]  
Other Phone:   
[phone]  
   
                                                    2022   
16:                              Systolic blood   
pressure                  120 mm[Hg]                Cesario Piedra  
Other Phone:   
[phone]                           [facility]   
[employer]   
[employer]  
Other Phone:   
[phone]  
   
                                                    2022   
11:                              Diastolic blood   
pressure                  60 mm[Hg]                 Cesario Piedra  
Work Phone:   
[phone]                          [facility] [address]   
DO  
Work Phone:   
[phone]  
   
                                                    2022   
11:                              Systolic blood   
pressure                  88 mm[Hg]                 Cesario Piedra  
Work Phone:   
[phone]                          [facility]   
DO  
Work Phone:   
[phone]  
   
                                                    2022   
11:          Body height         162.56 cm           Cesario Piedra  
Work Phone:   
[phone]                          [facility]   
DO  
Work Phone:   
[phone]  
   
                                                    2022   
11:                              Body mass index   
(BMI) [Ratio]             30.9 kg/m2                Cesario Piedra  
Work Phone:   
[phone]                          [facility]   
[facility] [address]   
DO  
Work Phone:   
[phone]  
   
                                                    2022   
11:                              Body surface area   
Derived from   
formula                   1.87 m2                   Cesario Piedra  
Work Phone:   
[phone]                          [facility]   
[facility] [address]   
DO  
Work Phone:   
[phone]  
   
                                                    2022   
11:          Body weight         81.65 kg            Cesario Piedra  
Work Phone:   
[phone]                          [facility]   
[facility] [address]   
DO  
Work Phone:   
[phone]  
   
                                                    2022   
11:          Heart rate          73 /min             Cesario Piedra  
Work Phone:   
[phone]                          [facility]   
[facility] [address]   
DO  
Work Phone:   
[phone]  
   
                                                    2022   
10:          Body height         162.56 cm           Cesario Piedra  
Work Phone:   
[phone]                          [facility]   
[facility] [address]   
DO  
Work Phone:   
[phone]  
   
                                                    2022   
10:                              Body mass index   
(BMI) [Ratio]             30.9 kg/m2                Cesario Piedra  
Work Phone:   
[phone]                          [facility]   
[facility] [address]   
DO  
Work Phone:   
[phone]  
   
                                                    2022   
10:                              Body surface area   
Derived from   
formula                   1.87 m2                   Cesario Piedra  
Work Phone:   
[phone]                          [facility]   
[facility] [address]   
DO  
Work Phone:   
[phone]  
   
                                                    2022   
10:          Body weight         81.65 kg            Cesario Piedra  
Work Phone:   
[phone]                          [facility]   
[facility]   
DO  
Work Phone:   
[phone]  
   
                                                    2022   
10:                              Diastolic blood   
pressure                  72 mm[Hg]                 Cesario Piedra  
Work Phone:   
[phone]                          [facility]   
[facility] [address]   
DO  
Work Phone:   
[phone]  
   
                                                    2022   
10:          Heart rate          67 /min             Cesario Piedra  
Work Phone:   
[phone]                          [facility]   
[facility]   
DO  
Work Phone:   
[phone]  
   
                                                    2022   
10:                              Systolic blood   
pressure                  98 mm[Hg]                 Cesario Piedra  
Work Phone:   
[phone]                          [facility]   
[facility]   
DO  
Work Phone:   
[phone]  
   
                                                    2022   
00:                              40 1                Cesario Piedra  
Work Phone:   
[phone]                          [facility]   
[facility] [address]   
DO  
Work Phone:   
[phone]  
   
                                                    Comment on   
above:                                  [id number]   
   
                                                    2022   
14:                              Diastolic blood   
pressure                  78 mm[Hg]                 Cesario Piedra  
Work Phone:   
[phone]                          [facility]   
[facility] [address]   
DO  
Work Phone:   
[phone]  
   
                                                    2022   
14:                              Systolic blood   
pressure                  120 mm[Hg]                Cesario Piedra  
Work Phone:   
[phone]                          [facility]   
[facility]   
DO  
Work Phone:   
[phone]  
   
                                                    2022   
14:          Body height         162.56 cm           Cesario Piedra  
Work Phone:   
[phone]                          [facility]   
[facility] [address]   
DO  
Work Phone:   
[phone]  
   
                                                    2022   
14:                              Body mass index   
(BMI) [Ratio]             31.58 kg/m2               Cesario Piedra  
Work Phone:   
[phone]                          [facility]   
[facility]   
DO  
Work Phone:   
[phone]  
   
                                                    2022   
14:                              Body surface area   
Derived from   
formula                   1.89 m2                   Cesario Piedra  
Work Phone:   
[phone]                          [facility]   
[facility] [address]   
DO  
Work Phone:   
[phone]  
   
                                                    2022   
14:          Body weight         83.46 kg            Cesario Piedra  
Work Phone:   
[phone]                          [facility]   
[facility]   
DO  
Work Phone:   
[phone]  
   
                                                    2022   
14:                              Diastolic blood   
pressure                  80 mm[Hg]                 Cesario Pinkmer  
Work Phone:   
[phone]                          [facility]   
[facility]   
DO  
Work Phone:   
[phone]  
   
                                                    2022   
14:          Heart rate          56 /min             Cesario Piedra  
Work Phone:   
[phone]                          [facility]   
[facility]   
DO  
Work Phone:   
[phone]  
   
                                                    2022   
14:                              Systolic blood   
pressure                  126 mm[Hg]                Cesario Pinkmer  
Work Phone:   
[phone]                          [facility]   
[facility]   
DO  
Work Phone:   
[phone]  
   
                                                    2022   
10:                              75.8 1              Cesario KATHY PinkDorothea  
Work Phone:   
[phone]                          [facility]   
[facility]   
DO  
Work Phone:   
[phone]  
   
                                                    Comment on   
above:                                  F[facility]   
   
                                                    02-   
11:                              Diastolic blood   
pressure                  80 mm[Hg]                 Cesario Piedra  
Work Phone:   
[phone]                          [facility]   
[facility] [address]   
DO  
Work Phone:   
[phone]  
   
                                                    02-   
11:                              Systolic blood   
pressure                  142 mm[Hg]                Cesario Piedra  
Work Phone:   
[phone]                          [facility]   
[facility]   
DO  
Work Phone:   
[phone]  
   
                                                    02-   
11:                              Diastolic blood   
pressure                  71 mm[Hg]                 Cesario Pinkmer  
Work Phone:   
[phone]                          [facility]   
[facility]   
DO  
Work Phone:   
[phone]  
   
                                                    02-   
11:                              Systolic blood   
pressure                  178 mm[Hg]                Cesario Piedra  
Work Phone:   
[phone]                          [facility]   
[facility]   
DO  
Work Phone:   
[phone]  
   
                                                    02-   
11:          Body height         157.48 cm           Cesario KATHY Piedra  
Work Phone:   
[phone]                          [facility]   
[facility]   
DO  
Work Phone:   
[phone]  
   
                                                    02-   
11:                              Body mass index   
(BMI) [Ratio]             34.75 kg/m2               Cesario KATHY Pink[place]a  
Work Phone:   
[phone]                          [facility]   
[facility]   
DO  
Work Phone:   
[phone]  
   
                                                    02-   
11:                              Body surface area   
Derived from   
formula                   1.87 m2                   Cesario KATHY Piedra  
Work Phone:   
[phone]                          [facility]   
[facility] [address]   
DO  
Work Phone:   
[phone]  
   
                                                    02-   
11:          Body weight         86.18 kg            Cesario KATHY Piedra  
Work Phone:   
[phone]                          [facility]   
[facility] [address]   
DO  
Work Phone:   
[phone]  
   
                                                    02-   
11:                              Diastolic blood   
pressure                  99 mm[Hg]                 Cesario Piedra  
Work Phone:   
[phone]                          [facility]   
[facility]   
DO  
Work Phone:   
[phone]  
   
                                                    02-   
11:          Heart rate          163 /min            Cesario Piedra  
Work Phone:   
[phone]                          [facility]   
[place]   
DO  
Work Phone:   
[phone]  
   
                                                    02-   
11:                              Systolic blood   
pressure                  178 mm[Hg]                Cesario Piedra  
Work Phone:   
[phone]                          [facility]   
[facility]   
DO  
Work Phone:   
[phone]  
   
                                                    11-   
14:          Body height         163.83 cm           Cesario Piedra  
Other Phone:   
[phone]                           [facility]   
Professional   
Corporation  
Other Phone:   
[phone]  
   
                                                    11-   
14:                              Body mass index   
(BMI) [Ratio]             34.62 kg/m2               Cesario Dorothea  
Other Phone:   
[phone]                           [facility]   
[employer]   
[employer]  
Other Phone:   
[phone]  
   
                                                    11-   
14:          Body temperature    98.8 [degF]         Cesario Dorothea  
Other Phone:   
[phone]                           [facility]   
[employer]   
[employer]  
Other Phone:   
[phone]  
   
                                                    11-   
14:          Body weight         92.94 kg            Cesario Dorothea  
Other Phone:   
[phone]                           [facility]   
[employer]   
[employer]  
Other Phone:   
[phone]  
   
                                                    11-   
14:                              Diastolic blood   
pressure                  88 mm[Hg]                 Cesario Dorothea  
Other Phone:   
[phone]                           [facility]   
[employer]   
[employer]  
Other Phone:   
[phone]  
   
                                                    11-   
14:          Respiratory rate    18 /min             Cesario Piedra  
Other Phone:   
[phone]                           [facility]   
[employer]   
[employer]  
Other Phone:   
[phone]  
   
                                                    11-   
14:                              SaO2% (BldA) [Mass   
fraction]                 94 %                      Cesario Dorothea  
Other Phone:   
[phone]                           [facility]   
[employer]   
[employer]  
Other Phone:   
[phone]  
   
                                                    11-   
14:                              Systolic blood   
pressure                  132 mm[Hg]                Cesario Piedra  
Other Phone:   
[phone]                           [facility]   
[employer]   
[employer]  
Other Phone:   
[phone]  
  
  
  
Encounters  
  
  
                          Encounter Date Encounter Type Care Provider Facility  
   
                          Start: 2023 ambulatory   PHYSICIAN NO FAMILY Fac  
ility:[facility]  
   
                                                    Start: 2023  
End: 2023           ambulatory                Cesario Piedra  
Other Phone:   
[phone]                           [facility]   
[employer]   
[employer]  
Other Phone:   
[phone]  
   
                          Start: 2023 Telephone encounter Cesario PYLE  
 Family Medicine   
[place]  
   
                                                    Start: 2023  
End: 2023           ambulatory                Cesario Piedra  
Other Phone:   
[phone]                           [facility]   
[employer]   
[employer]  
Other Phone:   
[phone]  
   
                                        Start: 2023   Office outpatient vi  
sit   
25 minutes                Cesario Piedra            [facility]   
[place]  
   
                                                    Start: 2023  
End: 2023     ambulatory          Cesario Piedra    Facility:[facility]  
   
                                                    Start: 2023  
End: 2023     ambulatory          PHYSICIAN NO [facility]  
Work Phone:   
[phone]  
   
                                                    Start: 2023  
End: 2023                         Patient encounter   
procedure                 PHYSICIAN NO [facility]  
Work Phone:   
[phone]  
   
                                                    Start: 2023  
End: 2023           ambulatory                Cesario Piedra  
Other Phone:   
[phone]                           [facility]   
[employer]   
[employer]  
Other Phone:   
[phone]  
   
                                        Start: 2023   Office outpatient vi  
sit   
15 minutes                Cesario Piedra            [facility]   
[place]  
   
                                                    Start: 2023  
End: 2023     ambulatory          CESARIO PIEDRA      Facility:[facility]  
   
                                                    Start: 05-  
End: 05-           ambulatory                Cesario Piedra  
Other Phone:   
[phone]                           [facility]   
[employer]   
[employer]  
Other Phone:   
[phone]  
   
                                        Start: 05-   Encounter for genera  
l   
adult medical   
examination without   
abnormal findings         Cesario Piedra            [facility]   
[place]  
   
                                        Start: 05-   Patient encounter   
procedure                 Cesario Piedra            [facility]   
[place]  
   
                          Start: 2023 Registered Recurring PHYSICIAN NO [facility]  
   
                                                    Start: 04-  
End: 2023     ambulatory          DR CESARIO JOAQUIN   Facility:[facility]  
   
                                                    Start: 2023  
End: 2023           ambulatory                Cesario Piedra  
Other Phone:   
[phone]                           [facility]   
[facility]   
[employer]  
Other Phone:   
[phone]  
   
                          Start: 2023 Telephone encounter Cesario PYLE  
[facility]   
[place]  
   
                                Start: 2023 Rx Renewal      Cesario Smith  
er  
Work Phone:   
[phone]                          [facility]   
[facility] DO  
Work Phone:   
[phone]  
   
                                                    Start: 2023  
End: 2023     ambulatory          PHYSICIAN NO FAMILY Facility:[facility]  
   
                                                    Start: 2023  
End: 2023     ambulatory          PHYSICIAN NO [facility]  
Work Phone:   
[phone]  
   
                                                    Start: 2023  
End: 2023                         Patient encounter   
procedure                 PHYSICIAN NO [facility] [address]   
[address]  
Work Phone:   
[phone]  
   
                                                    Start: 2022  
End: 2022     Departed Referred   PHYSICIAN NO [facility]   
[place]  
Work Phone:   
[phone]  
   
                                                    Start: 2022  
End: 2022           ambulatory                Pierce Wallace  
Other Phone:   
[phone]                           [facility]   
[facility]   
[employer]  
Other Phone:   
[phone]  
   
                                        Start: 2022   Office outpatient vi  
sit   
15 minutes                Pierce Wallace             [facility]   
[place]  
   
                                                    Start: 2022  
End: 2022           ambulatory                Cesario Piedra  
Other Phone:   
[phone]                           [facility]   
[facility]   
[employer]ation  
Other Phone:   
[phone]  
   
                          Start: 2022 Telephone encounter Cesario PYLE  
[facility]   
[place]  
   
                                                    Start: 11-  
End: 2022     ambulatory          CESARIO PIEDRA      Facility:[facility]  
   
                                                    Start: 2022  
End: 2022           ambulatory                Cesario Piedra  
Other Phone:   
[phone]                           [facility]   
[facility]   
[facility]  
Other Phone:   
[phone]  
   
                                        Start: 2022   Office outpatient vi  
sit   
25 minutes                Cesario Piedra            [facility]   
[place]  
   
                                                    Start: 2022  
End: 07-     ambulatory          DR DOCTOR ALMAZAN      Facility:[facility]  
   
                                                    Start: 2022  
End: 2022     ambulatory          SHAIKH PATRICIA BEE     Facility:[facility]  
   
                                                    Start: 2022  
End: 2022           ambulatory                Cesario Piedra  
Other Phone:   
[phone]                           [facility]   
[facility]   
[employer]  
Other Phone:   
[phone]  
   
                          Start: 2022 Telephone encounter Cesario PYLE  
[facility]   
[place]  
   
                                Start: 2022 Rx Renewal      Cesario Smith  
er  
Work Phone:   
[phone]                          [facility] DO  
Work Phone:   
[phone]  
   
                                                    Start: 2022  
End: 2022           ambulatory                Cesario Piedra  
Other Phone:   
[phone]                           [facility]   
Professional   
Corporation  
Other Phone:   
[phone]  
   
                          Start: 2022 Telephone encounter Cesario PYLE  
[facility]   
[place]  
   
                                                    Start: 2022  
End: 2022           ambulatory                Cesario Piedra  
Other Phone:   
[phone]                           [facility]   
Professional   
Corporation  
Other Phone:   
[phone]  
   
                                        Start: 2022   Office outpatient vi  
sit   
15 minutes                Cesario Piedra            [facility]   
[place]  
   
                                        Start: 2022   Patient encounter   
procedure                 Cesario Piedra            [facility]   
[place]  
   
                                        Start: 2022   EKG, Provider: [facility]   
RN 1,[id number],   
Status: Pen, Time: 2:00   
PM                                      Cesario Piedra  
Work Phone:   
[phone]                          [facility]   
[facility] DO  
Work Phone:   
[phone]  
   
                                Start: 2022 Rx Renewal      Cesario Smith  
er  
Work Phone:   
[phone]                          [facility]   
[facility] DO  
Work Phone:   
[phone]  
   
                                        Start: 2022   Office outpatient vi  
sit   
40 minutes                              Cesario Piedra  
Work Phone:   
[phone]                          [facility]   
[facility] DO  
Work Phone:   
[phone]  
   
                                Start: 2022 Rx Renewal      Cesario Smith  
er  
Work Phone:   
[phone]                          [facility]   
[facility] [address] DO  
Work Phone:   
[phone]  
   
                                        Start: 2022   Patient encounter   
procedure                               Cesario Piedra  
Work Phone:   
[phone]                          [facility]   
[facility] [address] DO  
Work Phone:   
[phone]  
   
                                        Start: 2022   Office outpatient vi  
sit   
10 minutes                              Cesario Piedra  
Work Phone:   
[phone]                          [facility]   
[facility] [address] DO  
Work Phone:   
[phone]  
   
                                                    Start: 2022  
End: 2022           ambulatory                Cesario Piedra  
Other Phone:   
[phone]                           [facility]   
Professional   
Corporation  
Other Phone:   
[phone]  
   
                          Start: 2022 Telephone encounter Cesario PYLE  
[facility]  
   
                                Start: 2022 Rx Renewal      Cesario Smith  
er  
Work Phone:   
[phone]                          [facility]   
[facility] [address] DO  
Work Phone:   
[phone]  
   
                                        Start: 02-   Office outpatient vi  
sit   
25 minutes                              Cesario Piedra  
Work Phone:   
[phone]                          [facility]   
[facility] DO  
Work Phone:   
[phone]  
   
                                Start: 2022 Rx Renewal      Cesario Smith  
er  
Work Phone:   
[phone]                          [facility]   
[facility] [address] DO  
Work Phone:   
[phone]  
   
                                Start: 2021 Rx Renewal      Cesario Smith  
er  
Work Phone:   
[phone]                          [facility]   
[facility] [address] DO  
Work Phone:   
[phone]  
   
                                Start: 2021 Rx Renewal      Cesario Smith  
er  
Work Phone:   
[phone]                          [facility]   
[address] [place]  
Work Phone:   
[phone]  
   
                                                    Start: 11-  
End: 11-           ambulatory                Cesario Dorothea  
Other Phone:   
[phone]                           [facility]   
Professional   
Corporation  
Other Phone:   
[phone]  
   
                                        Start: 11-   Office outpatient vi  
sit   
25 minutes                Cesario Piedra            [facility]  
  
  
  
Procedures  
  
  
                          Date         Procedure    Procedure Detail Performing   
Clinician  
   
                          Start: 2022 Urine culture              PHYSICIAN  
 NO FAMILY  
   
                                                            History of placement  
 of   
stent in anterior   
descending branch of   
left coronary artery                                Cesario Piedra  
Other Phone:   
[phone]  
   
                                                            History of placement  
 of   
stent in anterior   
descending branch of   
left coronary artery                    Status post insertion   
of drug-eluting stent   
into left anterior   
descending (LAD) artery                 PHYSICIAN NO FAMILY  
  
  
  
Plan of Treatment  
  
  
                          Date         Care Activity Detail       Author  
   
                                        Start: 2023   Screening mammograph  
y of   
bilateral breasts                       MM screening mammo BI   
w/CAD                                   [facility]  
   
                                        Start: 2022   FUV, Provider:   
Tomi Rodgers, Status:   
Pen, Time: 11:10 AM                     FUV, Provider:   
Tomi Rodgers, Status:   
Pen, Time: 11:10 AM                     [facility] [address] DO  
Work Phone:   
[phone]  
   
                                        Start: 2022   ECHO, Provider: GUANAKO MARINO   
[facility] ULTRASOUND   
01,[id number], Status:   
Pen, Time: 1:30 PM                      ECHO, Provider:   
LISA [facility]   
ULTRASOUND   
01,[id number], Status:   
Pen, Time: 1:30 PM                      [facility] [address] DO  
Work Phone:   
[phone]  
   
                                        Start: 2022   EKG, Provider: ANTONIETA SMYTH CARDIOLOGY RN   
1,[id number], Status:   
Pen, Time: 2:00 PM                      EKG, Provider: ANTONIETA SMYTH CARDIOLOGY   
RN 1,[id number],   
Status: Pen, Time: 2:00   
PM                                      [facility]   
[facility] [address] DO  
Work Phone:   
[phone]  
   
                                        Start: 2022   FUV, Provider:   
Tomi Rodgers, Status:   
Pen, Time: 11:10 AM                     FUV, Provider:   
Tomi Rodgers, Status:   
Pen, Time: 11:10 AM                     [facility] [address] DO  
Work Phone:   
[phone]  
   
                                        Start: 2022   NURSEVST, Provider:   
ANTONIETA SMYTH CARDIOLOGY RN   
1,[id number], Status:   
Pen, Time: 2:30 PM                      NURSEVST, Provider: ANTONIETA SMYTH CARDIOLOGY   
RN 1,[id number],   
Status: Pen, Time: 2:30   
PM                                      [facility]   
[facility] [address] DO  
Work Phone:   
[phone]  
   
                                        Start: 2022   FUV, Provider:   
Tomi Rodgers, Status:   
Pen, Time: 1:45 PM                      FUV, Provider:   
Tomi Rodgers, Status:   
Pen, Time: 1:45 PM                      [place] [address]   
[place]  
Work Phone:   
[phone]  
  
  
  
Immunizations  
  
  
                      Immunization Date Immunization Notes      Care Provider Messi henry  
   
                                        12-          [employer] COVI  
D-19   
Vacc 30 MCG/0.3ML   
Intramuscular Suspension                            Cesario Piedra  
Work Phone:   
[phone]                          [facility] [address]   
DO  
Work Phone:   
[phone]  
   
                                        2021          [employer] COVI  
D-19   
Vacc 30 MCG/0.3ML   
Intramuscular Suspension                            Cesariosendy Piedra  
Work Phone:   
[phone]                          [facility] [address]   
DO  
Work Phone:   
[phone]  
   
                                        2021          [employer] COVI  
D-19   
Vacc 30 MCG/0.3ML   
Intramuscular Suspension                            Cesario Piedra  
Work Phone:   
[phone]                          [facility] [address]   
DO  
Work Phone:   
[phone]  
   
                                        10-          pneumococcal conjuga  
te   
vaccine, 13 valent                                  Cesariosendy Piedra  
Work Phone:   
[phone]                          [facility] [address]   
DO  
Work Phone:   
[phone]  
  
  
  
Payers  
  
  
                          Date         Payer Category Payer        Policy ID  
   
                          2023   Medicare                  [id number]   
[id number]  
   
                          2023   Self-pay                  [id number]  
[id number]  
   
                          1960   Medicare                  [id number] 2.  
[id number]  
   
                          1959   Unknown                   [id number]  
[id number]  
   
                          1959   Unknown                   [id number] 2.16.84  
[id number]  
   
                          1959   Unknown                   [id number] 2.16.84  
[id number]  
   
                          1959   Unknown                   [id number] [id number]  
[id number]  
   
                          1959   Unknown                   [id number] [id number]  
[id number]  
   
                          1959   Unknown                   [id number] 2.16.84  
[id number]  
   
                                       Medicaid [employer]   [id number]   
[id number]  
   
                                       Unknown                     
   
                                       Unknown      HCAP/HFA/FAP Active [id number]  
[id number]   
[id number]  
   
                                       Unknown                   [id number] 2.16.8  
[id number]  
   
                                       Unknown                   [id number] 2.16.8  
[id number]  
   
                                       Unknown                   [id number] 2.16.8  
[id number]  
  
  
  
Social History  
  
  
                          Date         Type         Detail       Facility  
   
                                                            Daily caffeine   
consumption                             Daily caffeine   
consumption                             -[facility]   
[place]  
Work Phone:   
[phone]  
   
                                        Comment on above:   2 CUPS OF COFFEE IN   
AM;   
   
                                       Sex Assigned At Birth Sex Assigned At Bir  
th [facility]   
Professional   
Corporation  
Other Phone:   
[phone]  
   
                                                    Start: 2022  
End: 2022                         Tobacco smoking status   
NHIS                      Smoker (finding)          [facility]  
   
                          Start: 1960 Sex Assigned At Birth Female       F  
[facility]  
  
  
  
Medical Equipment  
  
  
                                Procedure Code  Equipment Code  Equipment Origin  
al   
Text                      Equipment Identifier      Dates  
   
                                                                CL STENT ALEX   
3.0   
X 12                      FDA                       Start:   
2019  
   
                                                                CL STENT ALEX   
3.0   
X 12                      FDA                       Start:   
2019  
  
  
  
Clinical Notes 2022 to 2023  
  
  
                                Note Date & Type Note            Facility  
  
  
  
                                                    2023 Evaluation note   
  
  
  
                                                    Encounter   
Date                      Diagnosis                 Assessment   
Notes  
   
                                                                                        BMI   
38.0-38.9,adult   
(ICD-10 -   
Z68.38)                                                       
   
                                                                                        Chronic   
obstructive   
pulmonary   
disease,   
unspecified   
COPD type   
(ICD-10 -   
J44.9)                                                        
   
                                                                                        Rheumatoid   
arthritis of   
hand,   
unspecified   
laterality,   
unspecified   
rheumatoid   
factor presence   
(ICD-10 -   
M06.9)                                                        
  
  
[facility]  
Other Phone: [phone]- Evaluation note*   
  
                      Encounter Date Diagnosis  Assessment Notes Treatment Notes  
 Treatment   
Clinical Notes  
   
                                                Essential   
hypertension (ICD-10   
- I10)                                              Due to patient   
being hypotensive   
which is likely   
contributing to a   
lot of her symptoms   
of feeling weak and   
tired she will have   
her metoprolol   
extended release 25   
mg cut in half. She   
is to continue   
monitoring her   
blood pressure and   
let me know if it   
continues to run   
low.  
                                          
   
                                                Acquired   
hypothyroidism   
(ICD-10 - E03.9)                                    We will check   
thyroid to make   
sure she is well   
controlled on   
levothyroxine 88   
mcg.  
                                          
   
                                                GERD   
(gastroesophageal   
reflux disease)   
(ICD-10 - K21.9)                                    Continue with   
Protonix 40 mg   
daily as it is   
working well for   
her reflux.  
                                          
   
                                                Chronic systolic   
congestive heart   
failure (ICD-10 -   
I50.22)                                             Patient is to check   
with cardiology to   
discuss decreasing   
Entresto to also   
help with her   
hypotension.  
                                          
   
                                                Medication   
monitoring encounter   
(ICD-10 - Z51.81)                                             
   
                                                Chronic fatigue   
(ICD-10 - R53.82)                                   We will check labs   
of B12 and vitamin   
D to assess if   
these are low and   
contributing to her   
fatigue.  
                                          
  
  
[facility]  
Other Phone: [phone]- Evaluation note*   
  
                      Encounter Date Diagnosis  Assessment Notes Treatment Notes  
 Treatment   
Clinical Notes  
   
                                        31 May, 2023        Nausea (ICD-10 -   
R11.0)                                              Nausea has resolved   
at this time. She   
hasn't needed the   
Zofran past 2-3   
days from her   
previous visit.  
                                          
   
                                        31 May, 2023        Stomach pain   
(ICD-10 - R10.9)                                    Stomach pain has   
improved with   
decrease the   
potassium. She will   
continue on only   
once daily   
potassium as she is   
having less stomach   
pains and her   
electrolytes are   
normal on labs.  
                                          
   
                                        31 May, 2023        Hypotension due to   
drugs (ICD-10 -   
I95.2)                                              Patient instructed   
to cut the Lasix in   
half to 20 mg   
daily. She is to   
track her BP over   
the next 2 weeks   
and then let me   
know. She is also   
to monitor her   
weight and if it   
goes up quickly she   
is to call. Also   
call if there is   
swelling.  
                                          
   
                                        31 May, 2023        Acute on chronic   
systolic   
congestive heart   
failure (ICD-10 -   
I50.23)                                                       
  
  
[facility]  
Other Phone: [phone]- Evaluation note*   
  
                      Encounter Date Diagnosis  Assessment Notes Treatment Notes  
 Treatment   
Clinical Notes  
   
                                        10 May, 2023        Essential   
hypertension (ICD-10   
- I10)                                                        
   
                                        10 May, 2023        Well adult exam   
(ICD-10 - Z00.00)                                   62-year-old   
female who is   
doing well with   
her chronic   
medical   
conditions   
however she is   
having some   
increased nausea   
and decreased   
appetite as well   
as stomach pain.   
She thinks it   
might be related   
to the potassium   
tablets though   
there is   
concerned that   
she is having a   
stomach ulcer   
again as she has   
had this in the   
past but she has   
no symptoms   
currently of   
melena or   
hematochezia. We   
will decrease   
potassium to once   
daily to see if   
this improves her   
symptoms and   
place her on   
Zofran so we can   
decrease her   
nausea so she can   
eat and drink   
properly. She is   
due for screening   
mammogram and   
this was ordered   
for her. We will   
recheck a   
metabolic panel   
in a couple weeks   
and see her back   
in the office in   
3 weeks.  
                                          
   
                                        10 May, 2023        Acquired   
hypothyroidism   
(ICD-10 - E03.9)                                              
   
                                        10 May, 2023        GERD   
(gastroesophageal   
reflux disease)   
(ICD-10 - K21.9)                                              
   
                                        10 May, 2023        Anxiety (ICD-10 -   
F41.9)                                                        
   
                                        10 May, 2023        Paroxysmal atrial   
fibrillation (ICD-10   
- I48.0)                                                      
   
                                        10 May, 2023        Chronic systolic   
congestive heart   
failure (ICD-10 -   
I50.22)                                                       
   
                                        10 May, 2023        Encounter for   
screening mammogram   
for malignant   
neoplasm of breast   
(ICD-10 - Z12.31)                                             
   
                                        10 May, 2023        Nausea (ICD-10 -   
R11.0)                                                        
   
                                        10 May, 2023        Stomach pain (ICD-10  
   
- R10.9)                                                      
  
  
[facility]  
Other Phone: [phone]- Evaluation note*   
  
                      Encounter Date Diagnosis  Assessment Notes Treatment Notes  
 Treatment   
Clinical Notes  
   
                                        21 Mar, 2023        Acute on chronic   
systolic congestive   
heart failure (ICD-10   
- I50.23)                                                     
   
                                        21 Mar, 2023        Essential   
hypertension (ICD-10   
- I10)                                                        
   
                                        21 Mar, 2023        Acquired   
hypothyroidism   
(ICD-10 - E03.9)                                              
   
                                        21 Mar, 2023        Medication monitorin  
g   
encounter (ICD-10 -   
Z51.81)                                                       
   
                                        21 Mar, 2023        Encounter for   
screening for   
cardiovascular   
disorders (ICD-10 -   
Z13.6)                                                        
  
  
[facility]  
Other Phone: [phone]- Evaluation note*   
  
                      Encounter Date Diagnosis  Assessment Notes Treatment Notes  
 Treatment   
Clinical Notes  
   
                                        28 Dec, 2022        Burning with   
urination (ICD-10   
- R30.0)                                                      
   
                                        28 Dec, 2022        Dysuria (ICD-10 -   
R30.0)                                              62 y.o. female   
presents to the   
office today with   
symptoms of dysuria   
and painful   
urination.   
Urinalysis was   
performed in the   
office today and   
does show positive   
WBC, small amount of   
blood and for this   
urine will be sent   
for culture. Advised   
to start macrobid   
100mg orally BID x 7   
days. Advised   
patient to take   
antibiotic as   
prescribed, take   
with food,  
complete entire   
course of antibiotic   
therapy even if   
feeling better.   
Advised patient that   
the medication   
Jadiance does cause   
excretion of glucose   
in her urine and   
this can increase   
risk of UTIs.   
Advised on good   
hygiene practices.   
Allergies and recent   
antibiotic use were  
reviewed with   
patient. Advised   
patient  
that urine will be   
sent for culture   
today and we will   
call with results in   
2-5 days. Patient   
instructed to   
increase oral   
hydration. Advised   
patient that   
symptoms should   
improve  
in the next 48   
hours. Advised that   
if symptoms persist   
or experiences flank  
pain, fever, chills,   
N/V or with other   
concerns, then to   
call back   
immediately. Patient   
acknowledges   
understanding and   
agrees  
to treatment.  
                                          
  
  
[facility]  
Other Phone: [phone]- Evaluation note*   
  
                      Encounter Date Diagnosis  Assessment Notes Treatment Notes  
 Treatment   
Clinical Notes  
   
                                                Rheumatoid arthritis  
   
of hand, unspecified   
laterality,   
unspecified   
rheumatoid factor   
presence (ICD-10 -   
M06.9)                                              Patient has a   
history of   
rheumatoid and was   
on medication for   
this but went off   
of it a year ago   
and now is having   
a lot of increased   
joint pain. We   
will refer her   
back to   
rheumatology for   
treatment.  
                                          
   
                                                Chronic systolic   
congestive heart   
failure (ICD-10 -   
I50.22)                                             Continue following   
with cardiology we   
will check   
magnesium levels   
due to her   
decreasing her   
magnesium and   
feeling like she   
is having more   
muscle aches.   
Continue with   
Jardiance and   
Entresto.  
                                          
   
                                                Acquired   
hypothyroidism   
(ICD-10 - E03.9)                                    Patient has not   
had her thyroid   
checked in a while   
and given her   
complaints of   
increased fatigue   
it is certainly   
possible her   
thyroid is not   
properly   
corrected. We will   
contact her with   
the results of the   
testing.  
                                          
   
                                                Medication monitorin  
g   
encounter (ICD-10 -   
Z51.81)                                                       
   
                                                Paroxysmal atrial   
fibrillation (ICD-10   
- I48.0)                                            Continue on   
metoprolol and   
Eliquis and follow   
with cardiology   
for this issue.  
                                          
  
  
[facility]  
Other Phone: [phone]- Evaluation note*   
  
                      Encounter Date Diagnosis  Assessment Notes Treatment Notes  
 Treatment   
Clinical Notes  
   
                                        09 May, 2022        Medicare annual   
wellness visit,   
initial (ICD-10 -   
Z00.00)                                             Mrs. Benavides seen   
today doing fairly   
well. She states   
disatisfaction with   
current   
cardiologists in   
regards to recent   
admission to   
hospital. Discussed   
with her the need to   
stay on her Lasix   
40mg. She is very   
satisfied with this   
decsion and will   
refer to new   
cardiologist for   
further evaluauton   
and continued care.   
Her BP is being well   
managed on   
Carvedilol 3.125,   
Amiodarone 200mg,   
and Lasix 40mg. At   
this visit she does   
not exhibit any   
signs of BLE edema,   
CHF exacerbation.   
She acknowledges   
treatment plan   
contiuing Lasix and   
to call if with   
increased signs of   
fluid overload. TSH   
level is 1.767 and   
is being well   
managed with   
Levothyroxine 88mcg.  
                                          
   
                                        09 May, 2022        Acute on chronic   
systolic congestive   
heart failure   
(ICD-10 - I50.23)                                   Lasix 40 mg daily  
                                          
   
                                        09 May, 2022        Essential   
hypertension (ICD-10   
- I10)                                              BP is well   
controlled on   
current medications   
and no adjustments   
since the hospital   
stay.  
                                          
   
                                        09 May, 2022        Paroxysmal atrial   
fibrillation (ICD-10   
- I48.0)                                            Heart is regular on   
todays exam.  
                                          
   
                                        09 May, 2022        Acquired   
hypothyroidism   
(ICD-10 - E03.9)                                              
  
  
[facility]  
Other Phone: [phone]- Evaluation note*   
  
                      Encounter Date Diagnosis  Assessment Notes Treatment Notes  
 Treatment   
Clinical Notes  
   
                                        21 Mar, 2022        Acquired   
hypothyroidism   
(ICD-10 - E03.9)                                      
  
                                          
  
  
  
  
[facility]  
Other Phone: [phone] noteNort Coast Professional Corporation  
Other Phone: [phone] noteNo InformationNort Coast Professional 
Corporation  
Other Phone: [phone] noteNo assessment information available
[facility]  
Work Phone: [phone] general Narrative - ReportedNort Coast 
Professional Corporation  
Other Phone: [phone] general Narrative - Reported*   
  
                                Type            Description     Date  
   
                                Medical History hypothyroidism    
   
                                Medical History HTN               
   
                                Medical History osteoporosis      
   
                                Medical History asthma            
   
                                Medical History GERD              
   
                                Medical History hyperlipdemia     
   
                                Medical History dysphagia         
   
                                Medical History rheumatoid arthritis   
   
                                Medical History anxiety           
   
                                Medical History copd              
   
                                Medical History A-FIB             
   
                                Surgical History BRONCHIAL CLEFT REPAIR   
   
                                Hospitalization History Child birth x2    
   
                                Hospitalization History Sepsis/Rhabdo   2019  
  
  
[facility]  
Other Phone: [phone] general Narrative - Reported*   
  
                                Type            Description     Date  
   
                                Medical History hypothyroidism    
   
                                Medical History HTN               
   
                                Medical History osteoporosis      
   
                                Medical History asthma            
   
                                Medical History GERD              
   
                                Medical History hyperlipdemia     
   
                                Medical History dysphagia         
   
                                Medical History rheumatoid arthritis   
   
                                Medical History anxiety           
   
                                Medical History copd              
   
                                Medical History A-FIB             
   
                                Surgical History BRONCHIAL CLEFT REPAIR   
   
                                Hospitalization History Child birth x2    
   
                                Hospitalization History Sepsis/Rhabdo   2019  
   
                                Hospitalization History Heart failure   3/2022  
  
  
[facility]  
Other Phone: [phone]  
  
Summary Purpose  
  
  
                                                      
  
  
  
Family History  
No Family History Records FoundUnknown Family Member  
  
                                Name            Dates           Details  
   
                                                    No pertinent family history:  
 Mother, Father, Sister,   
Brother(V49.89, Z78.9)  
                                                    Status:Active  
  
                              Unknown Family Member  
  
                                Name            Dates           Details  
   
                                                    No pertinent family history:  
 Mother, Father, Sister,   
Brother(V49.89, Z78.9)  
                                                    Status:Active  
  
                              Unknown Family Member  
  
                                Name            Dates           Details  
   
                                                    No pertinent family history:  
 Mother, Father, Sister,   
Brother(V49.89, Z78.9)  
                                                    Status:Active  
  
                              Unknown Family Member  
  
                                Name            Dates           Details  
   
                                                    No pertinent family history:  
 Mother, Father, Sister,   
Brother(V49.89, Z78.9)  
                                                    Status:Active  
  
                              Unknown Family Member  
  
                                Name            Dates           Details  
   
                                                    No pertinent family history:  
 Mother, Father, Sister,   
Brother(V49.89, Z78.9)  
                                                    Status:Active  
  
                              Unknown Family Member  
  
                                Name            Dates           Details  
   
                                                    No pertinent family history:  
 Mother, Father, Sister,   
Brother(V49.89, Z78.9)  
                                                    Status:Active  
  
                              Unknown Family Member  
  
                                Name            Dates           Details  
   
                                                    No pertinent family history:  
 Mother, Father, Sister,   
Brother(V49.89, Z78.9)  
                                                    Status:Active  
  
                              Unknown Family Member  
  
                                Name            Dates           Details  
   
                                                    No pertinent family history:  
 Mother, Father, Sister,   
Brother(V49.89, Z78.9)  
                                                    Status:Active  
  
                              Unknown Family Member  
  
                                Name            Dates           Details  
   
                                                    No pertinent family history:  
 Mother, Father, Sister,   
Brother(V49.89, Z78.9)  
                                                    Status:Active  
  
                              Unknown Family Member  
  
                                Name            Dates           Details  
   
                                                    No pertinent family history:  
 Mother, Father, Sister,   
Brother(V49.89, Z78.9)  
                                                    Status:Active  
  
                              Unknown Family Member  
  
                                Name            Dates           Details  
   
                                                    No pertinent family history:  
 Mother, Father, Sister,   
Brother(V49.89, Z78.9)  
                                                    Status:Active  
  
                              Unknown Family Member  
  
                                Name            Dates           Details  
   
                                                    No pertinent family history:  
 Mother, Father, Sister,   
Brother(V49.89, Z78.9)  
                                                    Status:Active  
  
  
  
Advance Directives  
No Advanced Directives Records Found  
  
                                Advance Directive Response        Recorded Date/  
Time  
   
                                Advance Directives Yes              1:10pm  
  
  
  
                                Advance Directive Response        Recorded Date/  
Time  
   
                                Advance Directives Yes              2:10pm  
  
  
  
Procedure Findings  
  
  
                                                    Note  
   
                                                    Pre-procedure Verification a  
nd Time Out: Pre-Procedure Verification and Time   
Out:   
Procedure Locationprocedure area HUDDLE - Pre-procedure Verificationcompleted 
TIME   
OUT - Final Verificationcompleted DEBRIEFcompleted General Information:   
Post-Procedure Diagnosis: Atrial Fibrillation, Atypical Atrial Flutter Procedure
   
Name: Atrial Fibrillation and Atypical Atrial Flutter Ablation Findings: See 
Below   
Procedure performed by: Dr. Rich Mullins Assistant(s): Dr. Juan Diego Ty 
Estimated   
Blood Loss (mL): 10 Specimen: no Procedure Details: Procedure Details: The   
indications, risks, benefits, alternatives, and details of the procedure were   
explained to the patient who expressed understanding of the risks including but 
are   
not limited to: pain, bleeding, and infection for which the risk is less than 
1%.   
More serious risks, including cardiac perforation and tamponade, vascular 
trauma,   
pulmonary vein stenosis, atrio-esophageal fistula, diaphragmatic paralysis,   
life-threatening arrhythmia, need for (more content not included)...  
  
  
  
Chief Complaint  
* GIACOMO BENAVIDES is being seen for a 6 month follow-up of.  
* Patient is in the office for follow-up for the problems noted below. Since her
   last visit she has not had any major breakthrough atrial fibrillation. She is
   on sotalol 60 mg twice daily and Coumadin therapy. She continues to smoke 
  half pack of cigarettes per day something I always discouraged her from doing.
   She has no symptoms suggestive angina pectoris or heart failure. Her weight 
  is still working progress. Her examination is unremarkable for obesity and 
  mild hypertension. EKG confirmed normal sinus rhythm and normal QTc interval.  
* Assessment/recommendations:  
* 1 coronary artery disease single vessel status post plasty to the LAD in 
  2019. She will remain on Plavix along with statin therapy, there has 
  been no recent angina pectoris patient remains at risk for recurrent disease 
  due to active tobacco abuse, this was made clear to her  
* 2 paroxysmal atrial fibrillation status post radiofrequency ablation at 
  [facility] with recurrent atrial fibrillation, presently on sotalol 
  60 mg twice daily. ECG confirmed normal sinus rhythm today. Patient remains on
   Coumadin  
* 3 hyperlipidemia on statin therapy , lipid profile is due and was requested  
* 4 obesity encouraged more weight control with diet and exercise  
* 5 hypertension on medical therapy not completely under control, will increase 
  lisinopril up to 5 mgdaily and check her blood pressure readings in few weeks  
* 6 rheumatoid arthritis on methotrexate and prednisone followed by rheumatology  
* 7 high-risk medication with warfarin and sotalol with no complications.  
* 8 tobacco abuse, was counseled again on tobacco cessation  
* GIACOMO BENAVIDES is being seen for hypertension and BP Check.  
* Patient is in the office for hypertension management since lisinopril 5 mg 
  daily was added her blood pressure has become under control. She does not 
  require diuretic therapy and potassium which I will discontinue.  
Patient is here for EKG ordred by Dr. Tomi Rodgers MD due to SSS diganosis. 
Dr. Lakeshia Vanegas MD in suite. Pt is here due to prolonged QT Interval. 
Medications updated verbally and patient did not bring in list or bottles. 
Patient has no cardiac complaints. EKG reviewed by BENTON Arroyo RN prior to 
discharge. To Dr. Tomi Rodgers MD for review.* GIACOMO BENAVIDES is being seen for 
  follow-up of a hospitalization for. # week/ [facility] d/c  
* Patient is in the office for follow-up to recent admission to the hospital 
  with pulmonary edema caused by left ventricular systolic and diastolic 
  dysfunction with atrial fibrillation RVR leading to the decompensation. She 
  had cardiac catheterization which revealed patent stent and no coronary diseas
  e otherwise. Ejection fraction 40%. The patient had problem with prolongation 
  of QTc interval whichwas mostly attributed to antiarrhythmic therapy and 
  antipsychotic medications. She left the hospital in sinus rhythm with no 
  antiarrhythmic therapy but has been anticoagulated. EKG subsequently revealed 
  normalization of QTc interval and today's EKG confirmed the same findings. She
   is in sinus rhythm at the present time. Her blood pressure is under control. 
  We initiated therapy with Entresto in the hospital which so far has been 
  tolerated. She currently has no evidence of volume overload.  
* Assessment/recommendations:  
* 1 coronary artery disease single vessel status post plasty to the LAD in 
  2019. Cardiac catheterization 2022 revealed no indication of 
  recurrent disease. Ejection fraction though was down to 40%  
* 2 paroxysmal atrial fibrillation status post radiofrequency ablation at 
  [facility], patient relapsed while she was on sotalol recently 
  leading to pulmonary edema. Due to QTC prolongation andLV systolic dysfunction
   sotalol was discontinued and the patient not tolerate dofetilide or amiodaro
  ne in the hospital because of QTC prolongation. She is currently in sinus 
  rhythm with normal QTc interval since her antipsychotic medication were 
  eliminated and will resume amiodarone at 200 mg dailyand monitor QTc interval.
   We will initiate amiodarone follow-up. ECG in 1 week will be scheduled. We 
  will continue anticoagulation.  
* 3 hyperlipidemia on statin therapy , LDL will be followed closely.  
* 4 obesity encouraged more weight control with diet and exercise  
* 5 hypertension on medical therapy, currently under control we will continue 
  small dose Coreg and Entresto and will titrate if tolerated down the road.  
* 6 rheumatoid arthritis followed by rheumatology  
* 7 high-risk medication with warfarin and amiodarone, will monitor both  
* 8 left ventricular systolic dysfunction, ejection fraction 40% 2022. 
  Currently on medium doseEntresto and small dose of Coreg which down the road 
  could be uptitrated if tolerated and possible introduction of Aldactone.  
* GIACOMO BENAVIDES is being seen for follow-up of a hospitalization for. # week/ [facility]
   d/c  
* Patient is in the office for follow-up to recent admission to the hospital 
  with pulmonary edema caused by left ventricular systolic and diastolic 
  dysfunction with atrial fibrillation RVR leading to the decompensation. She 
  had cardiac catheterization which revealed patent stent and no coronary diseas
  e otherwise. Ejection fraction 40%. The patient had problem with prolongation 
  of QTc interval whichwas mostly attributed to antiarrhythmic therapy and 
  antipsychotic medications. She left the hospital in sinus rhythm with no 
  antiarrhythmic therapy but has been anticoagulated. EKG subsequently revealed 
  normalization of QTc interval and today's EKG confirmed the same findings. She
   is in sinus rhythm at the present time. Her blood pressure is under control. 
  We initiated therapy with Entresto in the hospital which so far has been 
  tolerated. She currently has no evidence of volume overload.  
* Assessment/recommendations:  
* 1 coronary artery disease single vessel status post plasty to the LAD in 
  2019. Cardiac catheterization 2022 revealed no indication of 
  recurrent disease. Ejection fraction though was down to 40%  
* 2 paroxysmal atrial fibrillation status post radiofrequency ablation at 
  [facility], patient relapsed while she was on sotalol recently 
  leading to pulmonary edema. Due to QTC prolongation andLV systolic dysfunction
   sotalol was discontinued and the patient not tolerate dofetilide or amiodaro
  ne in the hospital because of QTC prolongation. She is currently in sinus 
  rhythm with normal QTc interval since her antipsychotic medication were 
  eliminated and will resume amiodarone at 200 mg dailyand monitor QTc interval.
   We will initiate amiodarone follow-up. ECG in 1 week will be scheduled. We 
  will continue anticoagulation.  
* 3 hyperlipidemia on statin therapy , LDL will be followed closely.  
* 4 obesity encouraged more weight control with diet and exercise  
* 5 hypertension on medical therapy, currently under control we will continue 
  small dose Coreg and Entresto and will titrate if tolerated down the road.  
* 6 rheumatoid arthritis followed by rheumatology  
* 7 high-risk medication with warfarin and amiodarone, will monitor both  
* 8 left ventricular systolic dysfunction, ejection fraction 40% 2022. 
  Currently on medium doseEntresto and small dose of Coreg which down the road 
  could be uptitrated if tolerated and possible introduction of Aldactone.  
* GIACOMO BENAVIDES is being seen for follow-up of a hospitalization for. # week/ [facility]
   d/c  
* Patient is in the office for follow-up to recent admission to the hospital 
  with pulmonary edema caused by left ventricular systolic and diastolic 
  dysfunction with atrial fibrillation RVR leading to the decompensation. She 
  had cardiac catheterization which revealed patent stent and no coronary diseas
  e otherwise. Ejection fraction 40%. The patient had problem with prolongation 
  of QTc interval whichwas mostly attributed to antiarrhythmic therapy and 
  antipsychotic medications. She left the hospital in sinus rhythm with no 
  antiarrhythmic therapy but has been anticoagulated. EKG subsequently revealed 
  normalization of QTc interval and today's EKG confirmed the same findings. She
   is in sinus rhythm at the present time. Her blood pressure is under control. 
  We initiated therapy with Entresto in the hospital which so far has been 
  tolerated. She currently has no evidence of volume overload.  
* Assessment/recommendations:  
* 1 coronary artery disease single vessel status post plasty to the LAD in 
  2019. Cardiac catheterization 2022 revealed no indication of 
  recurrent disease. Ejection fraction though was down to 40%  
* 2 paroxysmal atrial fibrillation status post radiofrequency ablation at 
  [facility], patient relapsed while she was on sotalol recently 
  leading to pulmonary edema. Due to QTC prolongation andLV systolic dysfunction
   sotalol was discontinued and the patient not tolerate dofetilide or amiodaro
  ne in the hospital because of QTC prolongation. She is currently in sinus 
  rhythm with normal QTc interval since her antipsychotic medication were 
  eliminated and will resume amiodarone at 200 mg dailyand monitor QTc interval.
   We will initiate amiodarone follow-up. ECG in 1 week will be scheduled. We 
  will continue anticoagulation.  
* 3 hyperlipidemia on statin therapy , LDL will be followed closely.  
* 4 obesity encouraged more weight control with diet and exercise  
* 5 hypertension on medical therapy, currently under control we will continue 
  small dose Coreg and Entresto and will titrate if tolerated down the road.  
* 6 rheumatoid arthritis followed by rheumatology  
* 7 high-risk medication with warfarin and amiodarone, will monitor both  
* 8 left ventricular systolic dysfunction, ejection fraction 40% 2022. 
  Currently on medium doseEntresto and small dose of Coreg which down the road 
  could be uptitrated if tolerated and possible introduction of Aldactone.  
  
  
Reason for Referral  
  
  
                                        Reason              Former patient of Dr Evans Joaquin, wants to re-establish  
   
                                        Diagnosis 1         Rheumatoid arthritis  
 of hand, unspecified laterality,   
unspecified rheumatoid factor presence (M06.9)  
   
                                        Referral Organization [facility]  
e Lisa  
   
                                        Referring Provider First Name Cesario  
   
                                        Referring Provider Last Name Dorothea  
   
                                        Referring Provider Specialty Family Prac  
lily  
   
                                        Referred Organization [facility]  
jonna  
   
                                        Referred Address    [address] [place],[address]  
   
                                        Referred Provider Specialty Rheumatology  
   
                                        Referral Priority   Routine  
  
  
  
                                        Reason              ** [facility]  
[place] in [place]  
   
                                        Diagnosis 1         Acute on chronic sys  
tolic congestive heart failure   
(I50.23)  
   
                                        Diagnosis 2         Paroxysmal atrial fi  
brillation (I48.0)  
   
                                        Diagnosis 3         Coronary artery dise  
ase involving native coronary   
artery of native heart without angina pectoris (I25.10)  
   
                                        Referral Organization [facility]  
e Lisa  
   
                                        Referring Provider First Name Cesario  
   
                                        Referring Provider Last Name Dorothea  
   
                                        Referring Provider Specialty Family Prac  
lily  
   
                                        Referred Organization [facility]  
   
                                        Referred Address    [address]  
[place]  
   
                                        Referred Provider Specialty Cardiology  
   
                                        Referral Priority   Routine  
   
                                        General Notes       Celine Meyers 05/  
10/2022 08:21:44 AM > referral   
received waiting for notes to be locked  
   
                                        Clinical Notes      P- [phone] [phone]  
5  
F - [phone]  
  
  
  
Chief Complaint and Reason for Visit  
  
  
                                        Chief Complaint     R30.0  
  
  
  
                                        Chief Complaint     BH  
Z12.31  
  
  
  
Additional Source Comments  
  
  
  
                                                    INFORMATION SOURCE (unrecogn  
ized section and content)  
   
                                          
  
  
  
                                        DATE CREATED        AUTHOR  
   
                                2020                      [facility]  
  
  
  
                                DATE CREATED    AUTHOR          AUTHOR'S ORGANIZ  
ATION  
   
                                2022                       [employer]  
  
  
  
                                DATE CREATED    AUTHOR          AUTHOR'S ORGANIZ  
ATION  
   
                                2023                      [facility]  
  
  
  
                                DATE CREATED    AUTHOR          AUTHOR'S ORGANIZ  
ATION  
   
                                2024                      [facility]  
  
  
  
  
  
                                                    REASON FOR VISIT (unrecogniz  
ed section and content)  
   
                                                    refillrefill6 month Follow u  
p for [facility] AWVdischarge6 month Follow upNo   
Informationpossible UTIrefillAnnual3 wk f/u5 MONTH FOLLOW UPfailed diagnosis 
code  
  
  
  
  
                                                    Care Teams (unrecognized sec  
tion and content)  
   
                                          
  
  
  
                                                    Team Status: Inactive   
   
                          Member       Role         Status       Dates  
   
                          JESSE Hopkins Attending Provider Active    
       
   
                          PHYSICIAN NO FAMILY Primary Care Provider Active        
   
  
  
  
                                                    Team Status: Inactive   
   
                          Member       Role         Status       Dates  
   
                          PHYSICIAN NO FAMILY Primary Care Provider Active        
   
   
                          Saeed Joaquin MD Attending Provider Active         
  
  
  
                                                    Team Status: Active   
   
                          Member       Role         Status       Dates  
   
                          PHYSICIAN NO FAMILY Primary Care Provider Active        
   
  
  
  
                                                    Team Status: Active   
   
                          Member       Role         Status       Dates  
   
                          Cesario Piedra DO Primary Care Provider Active      
     
  
  
  
                                                    Team Status: Active   
   
                          Member       Role         Status       Dates  
   
                          PHYSICIAN NO FAMILY Primary Care Provider Active        
   
   
                          Arnav So MD Attending Provider Active     
      
  
  
  
                                                    Team Status: Inactive   
   
                          Member       Role         Status       Dates  
   
                          Cesario Piedra DO Primary Care Provider, Attending  
 Provider Active         
  
  
  
  
  
                                                    Goals (unrecognized section   
and content)  
   
                                                    Goals may be documented in a  
n alternate section  
  
FOR RECORDS PERTAINING TO PATIENTS WHO ARE OR HAVE BEEN ENROLLED IN A CHEMICAL 
DEPENDENCY/SUBSTANCEABUSE PROGRAM, SOME INFORMATION MAY BE OMITTED. This 
clinical summary was aggregated from multiple sources. Caution should be 
exercised in using it in the provision of clinical care. This summary normalizes
 information from multiple sources, and as a consequence, information in this 
document may materially change the coding, format and clinical context of 
patient data. In addition, data may be omitted in some cases. CLINICAL DECISIONS
 SHOULD BE BASED ON THE PRIMARY CLINICAL RECORDS. [facility] [employer] [facility]. provides
 no warranty or guarantee of the accuracy or completeness of information in this
 document.

## 2024-02-28 ENCOUNTER — HOSPITAL ENCOUNTER
Age: 64
Discharge: HOME | End: 2024-02-28
Payer: MEDICARE

## 2024-02-28 DIAGNOSIS — Z72.0: ICD-10-CM

## 2024-02-28 DIAGNOSIS — E03.9: Primary | ICD-10-CM

## 2024-02-28 DIAGNOSIS — R06.00: ICD-10-CM

## 2024-02-28 DIAGNOSIS — R63.4: ICD-10-CM

## 2024-02-28 DIAGNOSIS — Z51.81: ICD-10-CM

## 2024-02-28 LAB
ADD MANUAL DIFF: NO
ALANINE AMINOTRANSFERASE: 16 U/L (ref 14–59)
ALBUMIN GLOBULIN RATIO: 0.6
ALBUMIN LEVEL: 3 G/DL (ref 3.4–5)
ALKALINE PHOSPHATASE: 97 U/L (ref 46–116)
ANION GAP: 9.9
ASPARTATE AMINO TRANSFERASE: 83 U/L (ref 15–37)
BLOOD UREA NITROGEN: 6 MG/DL (ref 7–18)
CALCIUM: 8.7 MG/DL (ref 8.5–10.1)
CARBON DIOXIDE: 34 MMOL/L (ref 21–32)
CHLORIDE: 99 MMOL/L (ref 98–107)
ESTIMATED GFR (AFRICAN AMERICA: >60 (ref 60–?)
ESTIMATED GFR (NON-AFRICAN AME: >60 (ref 60–?)
GLOBULIN: 4.7 G/DL
GLUCOSE: 88 MG/DL (ref 74–106)
HEMATOCRIT: 41.2 % (ref 36–48)
HEMOGLOBIN: 13.2 G/DL (ref 12–16)
IMMATURE GRANULOCYTES ABS AUTO: 0.01 10^3/UL (ref 0–0.03)
IMMATURE GRANULOCYTES PCT AUTO: 0.2 % (ref 0–0.5)
LYMPHOCYTES  ABSOLUTE AUTO: 2.2 10^3/UL (ref 1.2–3.8)
MCV RBC: 89.4 FL (ref 81–99)
MEAN CORPUSCULAR HEMOGLOBIN: 28.6 PG (ref 26.7–34)
MEAN CORPUSCULAR HGB CONC: 32 G/DL (ref 29.9–35.2)
PLATELET COUNT: 296 10^3/UL (ref 150–450)
POTASSIUM: 2.9 MMOL/L (ref 3.5–5.1)
RED BLOOD COUNT: 4.61 10^6/UL (ref 4.2–5.4)
SODIUM: 140 MMOL/L (ref 136–145)
TOTAL PROTEIN: 7.7 G/DL (ref 6.4–8.2)
TSH W/ REFLEX FT4: 0.57 UIU/ML (ref 0.36–3.74)
WHITE BLOOD COUNT: 6.1 10^3/UL (ref 4–11)

## 2024-02-28 PROCEDURE — 85025 COMPLETE CBC W/AUTO DIFF WBC: CPT

## 2024-02-28 PROCEDURE — 94726 PLETHYSMOGRAPHY LUNG VOLUMES: CPT

## 2024-02-28 PROCEDURE — 36415 COLL VENOUS BLD VENIPUNCTURE: CPT

## 2024-02-28 PROCEDURE — 99406 BEHAV CHNG SMOKING 3-10 MIN: CPT

## 2024-02-28 PROCEDURE — 94060 EVALUATION OF WHEEZING: CPT

## 2024-02-28 PROCEDURE — 80053 COMPREHEN METABOLIC PANEL: CPT

## 2024-02-28 PROCEDURE — 84443 ASSAY THYROID STIM HORMONE: CPT

## 2024-02-28 PROCEDURE — 94729 DIFFUSING CAPACITY: CPT

## 2024-02-28 NOTE — RT_ITS
[facility] 
                                        
                                       Test Date:    2024 
Pat Name:     GIACOMO BENAVIDES              Department:    
Patient ID:   [id number]               Room:         - 
Gender:       Female                   Technician:   Elsi Lamb RRT 
:          1960               Requested By: [id number] 
Order Number: [id number]              Reading MD:   Umesh Salvador 
                           Interpretive Statements 
Pulmonary function testing was completed according to ATS criteria. Findings 
were considered accurate and reproducible. Both pre- and post-bronchodilator 
values utilized for spirometry.  
 
Spirometry (based on pre-bronchodilator values): 
-FEV1/FVC: Reduced @ 70% 
-FEV1: Normal @ 103% 
-FVC: Normal @ 114% 
-There is no significant bronchodilator response. 
 
Lung volumes by plethysmography: 
-RV: Increased @ 131% 
-TLC: Increased @ 122% 
 
Diffusion capacity: 
-DLCO: Mild reduction @ 69% when corrected for Hb 13.2g/dL 
 
Flow-volume loop: 
-Mild obstructive pattern 
 
Impressions: 
-Spirometry suggests mild obstruction without a bronchodilator response.  
An elevated RV and TLC suggest air trapping and hyperinflation respectively. 
There is a mildly reduced diffusion capacity. Overall study is compatible 
with mild COPD/emphysema. Clinical correlation required. 
 
Electronically Signed On 2024 16:40:27 EST by Umesh Salvador

## 2024-09-05 ENCOUNTER — HOSPITAL ENCOUNTER
Dept: HOSPITAL 101 - LAB | Age: 64
Discharge: HOME | End: 2024-09-05
Payer: MEDICARE

## 2024-09-05 DIAGNOSIS — E03.9: Primary | ICD-10-CM

## 2024-09-05 LAB — TSH W/ REFLEX FT4: 0.79 UIU/ML (ref 0.36–3.74)

## 2024-09-05 PROCEDURE — 36415 COLL VENOUS BLD VENIPUNCTURE: CPT

## 2024-09-05 PROCEDURE — 84443 ASSAY THYROID STIM HORMONE: CPT

## 2024-09-05 NOTE — XMS_ITS
Comprehensive CCD (C-CDA v2.1)  
  
                          Created on: 2024  
  
  
Giacomo Berman  
External Reference #: CDR,PersonID:[id number]  
: 1960  
Sex: Female  
  
Demographics  
  
  
                                        Address             [address] [place]  [address]  
   
                                        Mobile Phone        [phone]  
   
                                        Preferred Language  en  
   
                                        Marital Status        
   
                                        Islam Affiliation Unknown  
   
                                        Race                White  
   
                                        Ethnic Group        Not  or Lati  
no  
  
  
Author  
  
  
                                        Organization        [facility]  
  
  
Care Team Providers  
  
  
                                Care Team Member Name Role            Phone  
   
                                Cesario Piedra N Unavailable     [phone]  
   
                                Unavailable     Unavailable     [phone]  
   
                                Dorothea, Cesario Unavailable     [phone]  
   
                                Dorothea, Cesario Unavailable     [phone]  
   
                                JESSE Wallace Attending Provider [phone]  
   
                                NO FAMILY, PHYSICIAN Primary Care Provider Unava  
MD Saeed Etienne Attending Provider [phone]  
[phone]  
   
                                Pierce Wallace  Unavailable     [phone]  
   
                                MISC, DR DONALDSON Admitting       Unavailable  
   
                                DOROTHEA, CESARIO Primary Care    Unavailable  
   
                                MISC, DR DONALDSON Attending       Unavailable  
   
                                MISC, DR DONALDSON Consulting      Unavailable  
   
                                DOROTHEA, CESARIO Admitting       Unavailable  
   
                                DOROTHEA, CESARIO Primary Care    Unavailable  
   
                                DOROTHEA, CESARIO Attending       Unavailable  
   
                                DOROTHEA, CESARIO Consulting      Unavailable  
   
                                JEMAL, DR NASSAR Admitting       Unavailable  
   
                                ZIEBER, DR ROGELIO VEE Consulting      Unavailable  
   
                                DOROTHEA, CESARIO Primary Care    Unavailable  
   
                                JOAQUIN, DR NASSAR Attending       Unavailable  
   
                                SALVADOR HUANG        Consulting      Unavailable  
   
                                JOAQUIN, DR NASSAR Consulting      Unavailable  
   
                                SHAIKH PATRICIA BEE Admitting       Unavailable  
   
                                DOROTHEA, CESARIO Primary Care    Unavailable  
   
                                SHAIKH PATRICIA BEE Attending       Unavailable  
   
                                DOROTHEA, CESARIO Attending       Unavailable  
   
                                DOROTHEA, CESARIO Consulting      Unavailable  
   
                                DOROTHEA, CESARIO Admitting       Unavailable  
   
                                DOROTHEA, CESARIO Attending       Unavailable  
   
                                DOROTHEA, CESARIO Consulting      Unavailable  
   
                                DOROTHEA, CESARIO Primary Care    Unavailable  
   
                                DOROTHEA, CESARIO Admitting       Unavailable  
   
                                NO FAMILY, PHYSICIAN Primary Care Provider Unava  
MD Arnav Watson Attending Provider [phone]  
   
                                Dorothea, DO Cesario CHAVEZ Primary Care Provider [phone]  
   
                                Dorothea, DO Cesario CHAVEZ Attending Provider [phone]  
   
                                DorotheaCesario bettencourt DO Primary Care Provider [phone]  
   
                                NO FAMILY, PHYSICIAN Primary Care Provider Unava  
ilMD Redd Pagan Attending Provider [phone]  
[phone]  
   
                                Cesario Piedra DO Primary Care Provider   
[phone]  
   
                                NO FAMILY, PHYSICIAN Primary Care Provider Unava  
ilMD Redd Pagan Attending Provider [phone]  
[phone]  
   
                                NO FAMILY, PHYSICIAN Primary Care Provider Unava  
ilable  
   
                                MD Redd So Attending Provider [phone]  
[phone]  
   
                                DorotheaDO Cesario Primary Care Provider [phone]  
   
                                DO Dorothea Cesario KATHY Attending Provider [phone]  
   
                                NO FAMILY, PHYSICIAN Primary Care Provider Unava  
ilMD Redd Pagan Attending Provider [phone]  
[phone]  
   
                                CESARIO PIEDRA Primary Care    Unavailab  
le  
   
                                ABHYANKAR, SATURNINO Referring       Unavailable  
   
                                ABHYANAMARIAAR, SATURNINO Attending       Unavailable  
   
                                CESARIO PIEDRA Primary Care    Unavailab  
le  
   
                                ABHYANKAR, SATURNINO Referring       Unavailable  
   
                                CESARIO PIEDRA Primary Care    Unavailab  
le  
   
                                ABHYANKAR, SATURNINO Attending       Unavailable  
   
                                CESARIO PIEDRA Primary Care    Unavailab  
le  
   
                                NO FAMILY, PHYSICIAN Primary Care Provider Unava  
ilMD Redd Pagan Attending Provider [phone]  
[phone]  
   
                                MD Francisco Baeza  Attending Provider [phone]  
   
                                JOSE RAMIREZ    Attending       Unavailable  
   
                                Dorothea, Cesario N Admitting       Unavailable  
   
                                Dorothea, Cesario N Primary Care    Unavailable  
   
                                Dorothea, Cesario N Attending       Unavailable  
   
                                Dorothea, Cesario N Primary Care    Unavailable  
   
                                Dorothea, Cesario N Attending       Unavailable  
   
                                Dorothea, Cesario N Admitting       Unavailable  
   
                                NO FAMILY, PHYSICIAN Primary Care    Unavailable  
   
                                Redd So Admitting       Unavailab  
Redd Vega Attending       Unavailab  
le  
   
                                Dorothea, Cesario N Primary Care    Unavailable  
   
                                Asaad, Imad     Admitting       Unavailable  
   
                                Asaad Imad     Attending       Unavailable  
   
                                DO Cesario Piedra Primary Care Provider [phone]  
   
                                DO Cesario Piedra Attending Provider [phone]  
   
                                NO FAMILY, PHYSICIAN Primary Care Provider MD Redd Delarosa Attending Provider [phone]  
  
  
  
Allergies  
  
  
                                                    Allergy   
Classification            Reported Allergen(s)      Allergy   
Type                                    Date of   
Onset                     Reaction(s)               Facility  
   
                                                      
(20 sources)                            Prochlorperazine;   
Translations:   
[Compazine]                             Drug   
Allergy                                 10-  
3                                       Other (See   
Comments),   
Intolerance                             [facility]  
   
                                                      
(1 source)                Prochlorperazine          Drug   
Allergy                                   
3                                                   [facility]   
Repository  
   
                                                      
(2 sources)                             Prochlorperazine;   
Translations:   
[PROCHLORPERAZINE]                      Drug   
Allergy                                 10-  
3                                                   [facility]   
Repository  
  
  
  
Medications  
Current Medications  
  
  
  
                      Medication Drug Class(es) Dates      Sig (Normalized) Sig   
(Original)  
   
                                                    [id number]   
actuat albuterol   
0.09 mg/actuat   
metered dose   
inhaler  
(20 sources)                            beta2-Adrenergic   
Agonist                                 Start:   
2024                                          Albuterol Sulfate   
Active 1 INH   
INHALATION Every 4   
hours 2024 12:00am  
  
  
  
                                Start: 2024                 albuterol HFA   
(PROVENTIL HFA,   
VENTOLIN HFA) 90 mcg/actuation   
inhaler Inhale 2 Puffs as   
instructed. 0 2024 Active  
   
                                        Start: 2024   take 2 puff(s) by in  
halation   
four times daily                        albuterol (PROVENTIL HFA;VENTOLIN   
HFA) 90 mcg/actuation inhaler   
Inhale 2 puffs 4 (four) times a   
day. 0 2024 Active  
   
                                                    Start: 2024  
End: 2024                         take 90 ug by inhalation every   
four hours                              Albuterol Sulfate Discontinued 90   
MCG INHALATION Every 4 hours  1:00am 2024 10:23am  
   
                                        Start: 05-   take 1 puff(s) by in  
halation   
every four hours as needed              Albuterol Sulfate  (90 Base)   
MCG/ACT 1 puff as needed Inhalation   
every 4 hrs for 30 day(s) 10 May,   
2021 Active  
   
                                        Start: 05-   take 1 puff(s) by in  
halation   
every four hours as needed              Albuterol Sulfate  (90 Base)   
MCG/ACT 1 puff as needed Inhalation   
every 4 hrs for 30 day(s) 10 May,   
2021 Active  
   
                                Start: 05-                   
   
                                Start: 2019                 Albuterol Sulf  
ate (2.5 MG/3ML)   
0.083% Inhalation Nebulization   
Solution USE AS DIRECTED. Quantity:   
0 Refills: 0 Ordered: 4-Dec-2019 DO   
Start : 4-Dec-2019 Active  
   
                                                    Start: 2019  
End: 2022                         take 2.5 mg by inhalation four   
times daily                             Albuterol Sulfate Discontinued 2.5   
MG INHALATION Four times daily -   
Respiratory 360    
12:00am 2022 4:48am  
  
  
  
                                                    Albuterol Sulfate (2.5 MG/ 3  
 ML) 2.5   
MG/3ML 0.083% Nebulization Solution  
(9 sources)                                                     Albuterol Sulfat  
e (2.5 MG/ 3 ML) 2.5   
MG/3ML 0.083% Nebulization Solution 3ml   
Inhalation 4 times a day Active  
  
  
  
                                                                  
  
  
  
                                                    amitriptyline   
hydrochloride 25 mg   
oral tablet  
(19 sources)                            Tricyclic   
Antidepressant      Start: 2024                       Amitriptyline Active  
   
0 .ROUTE .COMPLEX    
12:39pm TAKE 1 TABLET   
AT BEDTIME DAILY  
  
  
  
                                                    Start: 2024  
End: 2024                         take 25 mg by mouth once daily   
at bedtime                              Amitriptyline Discontinued 25 MG PO   
Daily at bedtime  1:05pm 2024   
12:39pm  
  
  
  
                                                    apixaban 5 mg   
oral tablet  
(20 sources)        Factor Xa Inhibitor Start: 09-   take 1 tablet   
by mouth   
twice daily                             Apixaban Active 5   
MG PO Twice daily   
2024   
1:00am FreeTextSi tablet Orally   
Twice a day; Note:   
Source Status:   
Taking; Provider:   
Dorothea Nassar   
(NPI: [id number])  
   
                                                    aspirin 81 mg   
delayed release   
oral tablet  
(20 sources)                            Platelet Aggregation   
Inhibitor,   
Nonsteroidal   
Anti-inflammatory   
Drug                      Start: 2022         take 81 mg by   
mouth once   
daily                                   Aspirin Active 81   
MG PO Daily 90    
12:00am  
  
  
  
                                                    Start: 2019  
End: 2020           take 81 mg by mouth once daily Aspirin Discontinued 81  
 MG PO   
Daily   
2019 1:00am 2020 11:58am  
   
                                Start: 2003                 ASPIRIN 81MG T  
ABLET Take one (1)   
tablet daily . 0 0 2003 Active  
  
  
  
                                                    atorvastatin 80 mg   
oral tablet  
(20 sources)                            HMG-CoA Reductase   
Inhibitor                 Start: 2024         take 80 mg   
by mouth   
once daily                              Atorvastatin Active   
80 MG PO Daily 2024 12:00am  
  
  
  
                                                    Start: 2021  
End: 2024                         take 1 tablet by mouth once   
daily                                   Atorvastatin (Lipitor) 80 mg Tablet   
Discontinued 80 MG PO Daily 2022 12:00am 2024 11:26am  
   
                                                    Start: 2019  
End: 2024           take 40 mg by mouth once daily Atorvastatin Discontinu  
ed 40 MG   
PO   
Daily May 7th, 2024 12:00am 2024 10:22am  
   
                                                    Start: 2019  
End: 06-           take 10 mg by mouth once daily Atorvastatin Discontinu  
ed 10 MG   
PO   
Daily 2019 1:00am   
2019 5:45pm  
  
  
  
                                                    furosemide 20 mg   
oral tablet  
(20 sources)        Loop Diuretic       Start: 2024   take 0.5   
tablet by   
mouth once   
daily                                   Furosemide Active 20   
MG PO Daily 2024 1:00am   
FreeTextSi/2   
tablet Orally daily;   
Note: Source Status:   
Taking; Refills: 1;   
Provider: Dorothea Nassar  
  
  
  
                                                    Start: 2022  
End: 2024           take 40 mg by mouth once daily Furosemide Discontinued  
 40 MG PO   
Daily at 0800 30 30 2022   
12:00am 2024 11:26am  
   
                                        Start: 2020   take 1 tablet by kathrin  
th once   
daily                                   furosemide (LASIX) 20 mg tablet Take   
1 tablet (20 mg total) by mouth   
daily. 90 tablet 2 2023 Active  
   
                                        Start: 2020   take 0.5 tablet by m  
outh once   
daily                                   Furosemide 20 MG 1/2 tablet Orally   
daily for 90 days    
Active  
   
                                                    Start: 2020  
End: 2022           take 40 mg by mouth once daily Furosemide Discontinued  
 40 MG PO   
Daily 2020 12:01pm 2022 4:48am  
  
  
  
                                                    levothyroxine sodium   
0.075 mg oral tablet  
(20 sources)        l-Thyroxine         Start: 2024   take 75 ug by   
mouth once   
daily                                   Levothyroxine Active   
75 MCG PO Daily 2024 12:00am  
  
  
  
                                                    Start: 2024  
End: 2024           take 88 ug by mouth once daily Levothyroxine Discontin  
ued 88 MCG  
 PO   
Daily 90 May 29th, 2024 2:19pm May   
29th, 2024 2:39pm  
   
                                                    Start: 2024  
End: 2024           take 75 ug by mouth once daily Levothyroxine Discontin  
ued 75 MCG  
 PO   
Daily 90 May 29th, 2024 12:00am 2024 10:22am  
   
                                                    Start: 2024  
End: 2024                         take 1 tablet by mouth once   
daily                                   Levothyroxine Discontinued 0 .ROUTE   
.COMPLEX  8:08am   
May 29th, 2024 2:20pm TAKE 1 TABLET   
BY MOUTH EVERY DAY  
   
                                                    Start: 2019  
End: 2024                         take 1 tablet by mouth once   
daily                                   Levothyroxine (Synthroid) 88 mcg   
Tablet Discontinued 88 MCG PO   
DAILY@0630 30 30 2019   
12:00am 2024 8:08am  
   
                                                    Start: 10-  
End: 2024                         take 100 ug by mouth once   
daily                                   Levothyroxine Discontinued 100 MCG PO   
Daily 2019 1:00am 2019 2:12pm  
   
                                                                Levothyroxine So  
dium 88 MCG TAKE 1   
TABLET BY MOUTH EVERY DAY FOR 90 DAYS   
for 90 Active  
  
  
  
                                                    magnesium glycinate   
100 mg magnesium   
capsule  
(3 sources)                                         Start: 2024  
End: 2024                         take 1 capsule   
by mouth once   
daily                                   magnesium   
glycinate 100 mg   
magnesium capsule   
Take 1 capsule by   
mouth once daily.   
15 capsule 1   
2024 Active  
   
                                                    24 hr metoprolol   
succinate 25 mg   
extended release   
oral tablet  
(20 sources)                            beta-Adrenergic   
Blocker                   Start: 2023         take 0.5 tablet   
by mouth once   
daily                                   Metoprolol   
Succinate Active   
12.5 MG PO Daily   
2024 1:00am   
FreeTextSi/2   
tablet Orally Once   
a day; Note:   
Source Status:   
Decrease;   
Provider: Dorothea CHAVEZ  
  
  
  
                                        Start: 2023   take 1 tablet by kathrin  
th once   
daily in the morning                    metoprolol succinate XL (TOPROL XL)   
25 mg 24 hr tablet TAKE 1 TABLET BY   
MOUTH EVERY MORNING 90 tablet 3   
2023 Active  
   
                                                    Start: 2020  
End: 2020           take 25 mg by mouth twice daily Metoprolol Tartrate Di  
scontinued  
 25   
MG PO Twice daily 60  12:00am 2020 12:00pm  
   
                                                            take 1 tablet by kathrin  
th once   
daily                                   metoprolol succinate ER (TOPROL XL)   
25 mg 24 hr tablet Take 12.5 mg by   
mouth once daily. 0 Active  
   
                                                            take 1 tablet by kathrin  
th every   
twenty-four hours                       Metoprolol Succinate ER 25 MG 1   
tablet Orally Once a day Active  
  
  
  
                                                    nitrofurantoin,   
macrocrystals 25 mg /   
nitrofurantoin,   
monohydrate 75 mg   
oral capsule  
(2 sources)                             Nitrofuran   
Antibacterial                           Start:   
2022                              take 1   
capsule by   
mouth every   
twelve hours                            Macrobid 100 MG 1   
capsule with food   
Orally every 12   
hrs for 7 day(s)   
28 Dec, 2022   
Active  
   
                                                    ondansetron 8 mg oral   
tablet  
(1 source)                              Serotonin-3   
Receptor Antagonist                                 take 1   
tablet by   
mouth every   
twenty-four   
hours                                   Ondansetron HCl 8   
MG 1 tablet as   
needed Orally   
Once a day for 30   
days Active  
   
                                                    pantoprazole 40 mg   
delayed release oral   
tablet  
(20 sources)                            Proton Pump   
Inhibitor                               Start:   
2024                              take 2   
tablets by   
mouth once   
daily, then   
take 1   
tablet by   
mouth once   
daily                                   Pantoprazole   
(Protonix) 40 mg   
tablet,delayed   
release (DR/EC)   
Active 40 MG PO   
Twice daily 112   
56 2024 3:32pm 2   
tablets once a   
day for 8 weeks.   
Then cut back   
down to 1 tab   
daily.  
  
  
  
                                                    Start: 2019  
End: 2024                         take 1 tablet by mouth once   
daily                                   Pantoprazole (Protonix) 40 mg   
tablet,delayed release (DR/EC)   
Discontinued 40 MG PO Daily 2024 1:00am May 2nd, 2024   
10:04am FreeTextSi tablet Orally   
Once a day; Note: Source Status:   
Taking; Refills: 3; Qty: 90 Tablet;   
Provider: Dorothea CHAVEZ  
  
  
  
                                                    potassium chloride 20   
meq powder for oral   
solution  
(20 sources)                            Start: 2024   take 20 mEq by   
mouth once daily                        Potassium Chloride   
Active 20 MEQ PO Daily   
30 May 22nd, 2024   
12:00am  
  
  
  
                                                    Start: 2024  
End: 2024                                     Potassium Chloride (Klor-Con  
 M20) 20   
mEq tablet,ER particles/crystals   
Discontinued 10 MEQ PO Daily 2024 11:27am May 22nd, 2024   
2:35pm  
   
                                        Start: 2022   take 2 tablets by mo  
uth once   
daily                                   Potassium Chloride [place] ER 20 MEQ Oral   
Tablet Extended Release TAKE 2 TABLET   
Daily Quantity: 0 Refills: 0 Ordered:   
2022 DO Start : 2022 Active  
   
                                                    Start: 2022  
End: 2024                                     Potassium Chloride (Klor-Con  
 M20) 20   
mEq Tablet,Er Particles/Crystals   
Discontinued 40 MEQ PO Daily 60  12:00am 2024 11:32am  
   
                                        Start: 2022   take 1 tablet by kathrin  
 three   
times daily                             Potassium Chloride ER 10 MEQ Oral   
Tablet Extended Release Take 1 tablet   
by mouth three times a day Quantity:   
270 Refills: 3 Ordered: 16-Mar-2022   
Tomi Rodgers MD Start : 16-Mar-2022   
Active  
   
                                                    Start: 2020  
End: 2022                                     Potassium Chloride (Klor-Con  
 M20) 20   
mEq tablet,ER particles/crystals   
Discontinued 20 MEQ PO Daily 2020 12:01pm 2022 5:08am  
   
                                        Start: 2020   take 1 tablet by kathrin  
 every   
twenty-four hours                       Potassium Chloride ER 10 MEQ 1 tablet   
with food Orally Once a day for 30 days   
26 Mar, 2020 Active  
   
                                        Start: 2020   take 1 tablet by kathrin  
th every   
eight hours                             Potassium Chloride ER 10 MEQ 1 tablet   
with food Orally tid for 30 day(s) 26   
Mar, 2020 Active  
   
                                                    Start: 2020  
End: 2020                                     Potassium Chloride (Klor-Con  
 M20) 20   
mEq Tablet,Er Particles/Crystals   
Discontinued 40 MEQ PO Daily 60  12:00am 2020   
12:01pm  
   
                                                            take 10 mEq by mouth  
 once   
daily                                   potassium chloride (KLOR-CON M10 ORAL)   
Take 10 mEq by mouth once daily. 0   
Active  
   
                                                            take 1 dose by mouth  
 in the   
morning                                 potassium chloride (KLOR-CON) 20 mEq   
packet Take 1 packet (20 mEq total) by   
mouth in the morning and 1 packet (20   
mEq total) before bedtime. 0 Active  
  
  
  
                                                    predniSONE 5 mg oral   
tablet  
(18 sources)                            Start: 2019   take 0.5 tablet by   
mouth once daily                        predniSONE 5 MG Oral   
Tablet TAKE 0.5 TABLET   
Daily Quantity: 0   
Refills: 0 Ordered:   
4-Dec-2019 DO Start :   
4-Dec-2019 Active  
  
  
  
                                                    Start: 2019  
End: 2024                         take 1-2 tablets by mouth once   
daily                                   predniSONE (DELTASONE) 5 mg tablet   
TAKE 1-2 TABLETS BY MOUTH EVERY DAY   
11 2019   
Discontinued  
   
                                                    Start: 2019  
End: 2022           take 2.5 mg by mouth once daily Prednisone Discontinue  
d 2.5 MG   
PO   
Daily 2019 1:00am   
2022 4:41am  
   
                                                            take 1 tablet by kathrin  
th every   
twenty-four hours                       prednisone 10 MG 1 Tablet Orally   
Once a day Active  
  
  
  
                                                    sertraline 100 mg   
oral tablet  
(12 sources)                            Serotonin   
Reuptake   
Inhibitor                 Start: 2024         take 1 tablet   
by mouth once   
daily                                   sertraline   
(ZOLOFT) 100 mg   
tablet Take 1   
tablet (100 mg   
total) by mouth   
nightly. 0   
2024 Active  
  
  
  
                                                    Start: 2022  
End: 2022                         take 1 tablet by mouth once   
daily                                   Sertraline (Zoloft) 100 mg Tablet   
Discontinued 100 MG PO Daily 2022 12:00am 2022   
2:17pm  
   
                                                                Zoloft 50 MG Ora  
l Tablet TAKE 1 AND   
1/2 TABLETS DAILY. Quantity: 0   
Refills: 0 Ordered: 10-Feb-2022 DO   
Active  
  
  
  
Completed/Discontinued Medications  
  
  
  
                      Medication Drug Class(es) Dates      Sig (Normalized) Sig   
(Original)  
   
                                                    alendronic acid 70   
mg oral tablet  
(11 sources)              Bisphosphonate            Start:   
2019  
End:   
2020                              take 70 mg by mouth   
every week                              Alendronate   
Discontinued 70 MG   
PO every week   
2019   
1:00am 2020 12:57pm  
   
                                                    ALPRAZolam 1 mg   
oral tablet  
(20 sources)              Benzodiazepine            Start:   
2019  
End:   
2020                              take 1 mg by mouth   
three times daily                       Alprazolam   
Discontinued 1 MG   
PO Three times   
daily 0 2019 2:09pm 2020 4:18pm  
  
  
  
                                                take 1 tablet by mouth once stephanie  
y ALPRAZolam 1 mg dissolvable tablet Take 1 mg   
by mouth once daily. 0 Active  
   
                                                            take 1 tablet by kathrin  
th every twelve   
hours                                   ALPRAZolam 1 MG 1 tablet Orally Twice a   
day   
Active  
   
                                                            take 1 tablet by kathrin  
th three times   
daily as needed                         ALPRAZolam ER 1 MG Oral Tablet Extended   
Release 24 Hour 1 tablet three times daily   
as needed Quantity: 0 Refills: 0 Ordered:   
10-Feb-2022 DO Active  
  
  
  
                                                    amiodarone   
hydrochloride 200 mg   
oral tablet  
(20 sources)        Antiarrhythmic      Start: 2022   take 1 tablet   
by mouth once   
daily                                   Amiodarone HCl -   
200 MG Oral Tablet   
TAKE 1 TABLET   
DAILY. Quantity:   
90 Refills: 1   
Ordered:   
2022   
Tomi Rodgers MD   
Start :   
2022 Active   
new start  
  
  
  
                                                    Start: 03-  
End: 2020                         take 200 mg by mouth once   
daily                                   Amiodarone Discontinued 200 MG PO   
Daily 2020 12:00am 2020 3:48pm  
  
  
  
                                                    atenolol 25 mg oral   
tablet  
(8 sources)                             beta-Adrenergic   
Blocker                                 Start:   
2019  
End: 2019                         take 25 mg   
by mouth   
once daily                              Atenolol   
Discontinued 25 MG   
PO Daily 2019 1:00am   
2019 10:02am  
   
                                                    azithromycin 250 mg   
oral tablet  
(8 sources)                             Macrolide   
Antimicrobial                           Start:   
2019  
End: 03-                         take 250 mg   
by mouth   
every   
twenty-four   
hours                                   Azithromycin   
Discontinued 250   
MG PO Q24H 3 3   
2019   
12:00am 2020 4:04pm  
   
                                                    busPIRone   
hydrochloride 15 mg   
oral tablet  
(20 sources)                                        Start:   
03-  
End: 2022                         take 30 mg   
by mouth   
twice daily                             Buspirone   
Discontinued 30 MG   
PO Twice daily   
2020   
4:03pm 2022 2:17pm  
  
  
  
                                                    Start: 2019  
End: 03-           take 15 mg by mouth twice daily Buspirone Discontinued  
 15 MG PO   
Twice daily 60 2019   
12:00am 2020 4:04pm  
   
                                                    Start: 2019  
End: 2024                         take 1 tablet by mouth twice   
daily                                   busPIRone (BUSPAR) 7.5 mg tablet   
Take 7.5 mg by mouth twice daily. 1   
2019 Discontinued  
  
  
  
                                                    carvedilol 3.125 mg   
oral tablet  
(17 sources)                            alpha-Adrenergic   
Blocker,   
beta-Adrenergic   
Blocker                                 Start:   
2022  
End: 2024                         take 3.125 mg   
by mouth   
twice daily   
at mealtime                             Carvedilol   
Discontinued   
3.125 MG PO   
Twice daily with   
meals    
12:00am 2024   
11:26am  
   
                                                    cholecalciferol 1.25   
mg oral capsule  
(2 sources)               Vitamin D                 Start:   
2019  
End: 2024                         take 1   
capsule by   
mouth every   
week                                    cholecalciferol,   
Vitamin D3,   
(VITAMIN D3)   
50,000 unit cap   
capsule Take by   
mouth. TAKE ONE   
CAPSULE BY MOUTH   
WEEKLY. 3   
2019   
Discontinued  
   
                                                    clopidogrel 75 mg   
oral tablet  
(20 sources)                            P2Y12 Platelet   
Inhibitor                               Start:   
2019  
End: 2024                         take 75 mg by   
mouth once   
daily                                   Clopidogrel   
Discontinued 75   
MG PO Daily    
12:00am 2022   
5:08am  
   
                                                    24 hr dilTIAZem   
hydrochloride 180 mg   
extended release   
oral capsule  
(13 sources)                            Calcium Channel   
Blocker                                 Start:   
2019                              take 1   
capsule by   
mouth once   
daily in the   
morning                                 dilTIAZem HCl ER   
Beads 180 MG   
Oral Capsule   
Extended Release   
24 Hour TAKE 1   
CAPSULE DAILY IN   
THE MORNING.   
Quantity: 0   
Refills: 0   
Ordered:   
4-Dec-2019 DO   
Start :   
4-Dec-2019   
Active  
  
  
  
                                                    Start: 2019  
End: 03-           take 120 mg by mouth once daily Diltiazem Hcl Disconti  
nued 120   
MG PO   
Daily    
1:00am 2020 4:04pm  
   
                                                      
End: 2024                         take 1 tablet by mouth four   
times daily                             diltiazem (CARDIZEM) 120 mg tablet   
Take 120 mg by mouth four times   
daily. 0 2024 Discontinued  
  
  
  
                                                    empagliflozin 10 mg   
oral tablet  
(20 sources)                            Sodium-Glucose   
Cotransporter 2   
Inhibitor                               Start:   
2022  
End: 2024                         take 1   
tablet by   
mouth once   
daily                                   Empagliflozin   
Discontinued 10 MG   
PO Daily 2024 1:00am   
2024   
2:07pm   
FreeTextSi   
tablet Orally Once   
a day; Note:   
Source Status:   
Taking; Refills:   
1; Qty: 90 Tablet;   
Provider: Dorothea CHAVEZ  
  
  
  
                                                            take 1 tablet by kathrin  
 once daily at   
breakfast                               empagliflozin (JARDIANCE) 25 mg tablet T  
aldo   
25 mg by mouth daily with breakfast. 0   
Active  
  
  
  
                                                    ergocalciferol 1.25   
mg oral capsule  
(6 sources)                             Provitamin D2   
Compound                                Start:   
2019                              take 1   
capsule by   
mouth every   
week                                    Ergocalciferol 1.25   
MG (98317 UT) Oral   
Capsule TAKE 1   
CAPSULE WEEKLY.   
Quantity: 0   
Refills: 0 Ordered:   
4-Dec-2019 DO Start   
: 4-Dec-2019 Active  
   
                                                    ferrous sulfate 325   
mg oral tablet  
(8 sources)                                         Start:   
2020  
End: 2022                         take 325 mg   
by mouth   
once daily                              Ferrous Sulfate   
Discontinued 325 MG   
PO Daily 2020 12:00am   
2022   
5:08am  
   
                                                    folic acid 1 mg oral   
tablet  
(20 sources)                                        Start:   
2019  
End: 2024                         take 1   
tablet by   
mouth once   
daily                                   Folic Acid   
Discontinued 1 MG   
PO Daily 2024 1:00am   
2024   
12:34pm   
FreeTextSi   
tablet Orally Once   
a day; Note: Source   
Status: Taking;   
Provider: Jemal  
   
                                                    hydrOXYzine pamoate   
50 mg oral capsule  
(8 sources)               Antihistamine             Start:   
2019  
End: 03-                         take 50 mg   
by mouth   
three times   
daily                                   Hydroxyzine Pamoate   
Discontinued 50 MG   
PO Three times   
daily    
1:00am 2020 4:04pm  
   
                                                    lamoTRIgine 150 mg   
oral tablet  
(20 sources)                            Mood Stabilizer,   
Anti-epileptic   
Agent                                   Start:   
2020  
End: 2020                         take 25 mg   
by mouth   
once daily   
at bedtime                              Lamotrigine   
Discontinued 25 MG   
PO Daily at bedtime   
 12:00am 2020 12:01pm  
  
  
  
                                                    Start: 2020  
End: 2020                         take 75 mg by mouth once daily   
at bedtime                              Lamotrigine Discontinued 75 MG PO   
Daily at bedtime 2020   
12:00am 2020 4:32pm  
   
                                                    Start: 2020  
End: 2024                         take 1 tablet by mouth once   
daily                                   Lamotrigine (Lamictal) 150 mg Tablet   
Discontinued 150 MG PO Daily 2022 12:00am 2022   
2:17pm  
   
                                                            take 1 tablet by kathrin  
th once   
daily                                   LaMICtal XR 50 MG Oral Tablet   
Extended Release 24 Hour Take 1   
tablet daily Quantity: 0 Refills: 0   
Ordered: 10-Feb-2022 DO Active  
  
  
  
                                                    lisinopril 5 mg   
oral tablet  
(20 sources)                            Angiotensin   
Converting Enzyme   
Inhibitor                 Start: 02-         take 1 tablet   
by mouth once   
daily                                   Lisinopril 5 MG   
Oral Tablet TAKE 1   
TABLET DAILY.   
Quantity: 90   
Refills: 3   
Ordered:   
10-Feb-2022   
Tomi Rodgers MD   
Start :   
10-Feb-2022 Active   
dose increased  
  
  
  
                                        Start: 2021   take 1 tablet by kathrin  
th once   
daily                                   Lisinopril 2.5 MG Oral Tablet TAKE 1   
TABLET BY MOUTH EVERY DAY Quantity:   
90 Refills: 3 Ordered: 30-Dec-2021   
Tomi Rodgers MD Start :   
30-Dec-2021 Active  
   
                                                    Start: 2020  
End: 2024           take 5 mg by mouth once daily Lisinopril Discontinued   
5 MG PO   
Daily 2020 12:00am   
2024 11:26am  
   
                                        Start: 2019   take 1 tablet by kathrin  
th once   
daily                                   Lisinopril 5 MG Oral Tablet TAKE 1   
TABLET DAILY. Quantity: 90 Refills:   
0 Ordered: 4-Dec-2019 DO Start :   
4-Dec-2019 Active  
   
                                                    Start: 04-  
End: 2024                         take 1 tablet by mouth once   
daily                                   lisinopril (ZESTRIL, PRINIVIL) 20 mg   
tablet Take 20 mg by mouth once   
daily. 5 04/10/2019 2024   
Discontinued  
   
                                                    Start: 2019  
End: 2020           take 5 mg by mouth once daily Lisinopril Discontinued   
5 MG PO   
Daily 2019 1:00am   
2020 11:59am  
   
                                                            take 0.5 tablet by m  
outh once   
daily                                   Lisinopril 5 MG 1/2 tablet Orally   
Once a day for 30 days Active  
  
  
  
                                                    LORazepam 0.5 mg   
oral tablet  
(3 sources)         Benzodiazepine      Start: 2019   take 1 tablet   
by mouth three   
times daily as   
needed                                  LORazepam 0.5 MG   
Oral Tablet TAKE 1   
TABLET 3 TIMES DAILY   
AS NEEDED. Quantity:   
0 Refills: 0   
Ordered: 4-Dec-2019   
DO Start :   
4-Dec-2019 Active  
   
                                                    magnesium oxide   
400 mg oral tablet  
(20 sources)                                        Start: 2022  
End: 2024                         take 400 mg by   
mouth twice   
daily                                   Magnesium Oxide   
Discontinued 400 MG   
PO Twice daily 60 30   
2022   
12:00am 2024 11:26am  
  
  
  
                                                                Magnesium Oxide   
Active  
  
  
  
                                                    Methotrexate  
(20 sources)                            Folate Analog Metabolic   
Inhibitor                               Start: 2024  
End: 2024                                     methotrexate sodium   
Discontinued PO 2024 1:00am 2024 12:35pm  
  
  
  
                                                    Start: 2024  
End: 2024                                     methotrexate sodium Disconti  
nued PO   
2024 12:00am 2024 11:35am  
   
                                        Start: 2019   take 8 tablets by mo  
ut every   
week                                    Methotrexate Sodium 2.5 MG Oral   
Tablet TAKE 8 TABLETS PER WEEK.   
Quantity: 0 Refills: 0 Ordered:   
4-Dec-2019 DO Start : 4-Dec-2019   
Active  
   
                                                    Start: 2019  
End: 2024                         take 6 tablets by mouth every   
week, then take 6 tablets by   
mouth every week                        methotrexate 2.5 mg tablet   
Indications: Seropositive rheumatoid   
arthritis (HCC) Take 6 tablets by   
mouth one time a week. Dose has been   
decreased to 6 tablets ONCE WEEKLY,   
as instructed 72 tablet 1 2019 Discontinued  
   
                                                    Start: 2019  
End: 2020                                     Methotrexate Discontinued 0   
.ROUTE   
.COMPLEX 2019 1:00am   
2020 1:00pm 8 2.5 MG TAB   
ONCE WEEKLY on   
   
                                                                Methotrexate Sod  
ium Active  
  
  
  
                                                    Nebulizer Accessories  
(8 sources)                                         Start: 2019  
End: 2022                                     Nebulizer Accessories Discon  
tinued 0   
.ROUTE .MEDSUPPLY    
12:00am 2022 4:49am As   
directed  
  
  
  
                                                    Start: 2019  
End: 2022                                     Nebulizer Accessories Discon  
tinued 0 .ROUTE .MEDSUPPLY  11:00pm 2022 3:49am As directed  
  
  
  
                                                    nitroglycerin 0.4 mg   
sublingual tablet  
(20 sources)                            Nitrate   
Vasodilator                             Start:   
2019  
End: 2024                                     Nitroglycerin   
Discontinued 0.4 MG   
SUBLINGUAL every 5   
to 15 minutes 2020 12:00am   
2022   
5:09am  
   
                                                    omeprazole 40 mg   
oral tablet  
(18 sources)                            Proton Pump   
Inhibitor                               Start:   
2019  
End: 03-                         take 40 mg by   
mouth once   
daily at   
bedtime                                 Omeprazole   
Discontinued 40 MG   
PO Daily at bedtime   
 1:00am   
2020   
4:04pm  
  
  
  
                                                    Start: 2019  
End: 2024                         take 40 mg by mouth twice   
daily                                   Omeprazole Discontinued 40 MG PO   
Twice daily 2019   
1:00am 2019 10:04am  
  
  
  
                                                    rOPINIRole 0.5 mg   
oral tablet  
(16 sources)                            Nonergot   
Dopamine Agonist                        Start: 2019  
End: 03-                         take 0.5 mg by   
mouth once   
daily at   
bedtime                                 Ropinirole   
Discontinued 0.5 MG   
PO Daily at bedtime   
   
12:00am 2020 4:04pm  
  
  
  
                                                    Start: 2019  
End: 06-                         take 0.5 mg by mouth once daily   
at bedtime                              Ropinirole Discontinued 0.5 MG PO   
Daily at bedtime 2019   
1:00am 2019 12:20pm  
  
  
  
                                                    sacubitril 24 mg   
/ valsartan 26 mg   
oral tablet  
(20 sources)                            Angiotensin 2   
Receptor Blocker                        Start: 2023  
End: 2024                         take 1 tablet   
by mouth in   
the morning                             ENTRESTO 24-26 mg   
tablet Take 1   
tablet by mouth in   
the morning and 1   
tablet before   
bedtime. 180 tablet   
3 2024   
Discontinued (Stop   
Taking at   
Discharge)  
  
  
  
                                                    Start: 2022  
End: 2024                         take 1 tablet by mouth   
twice daily                             Sacubitril-Valsartan (Entresto) 24-26 mg  
   
tablet Discontinued 1 TAB PO Twice daily   
2024 11:27am May 22nd,   
2024 2:02pm  
   
                                                                ENTRESTO 24 mg/2  
6 mg 1 orally twice a   
day Active  
  
  
  
                                                    Sod Sulf-Pot Chloride-Mag Cano  
lf   
(Sutab) 1.479-0.188- 0.225 gram   
tablet  
(5 sources)                                         Start: 2024  
End: 2024                                     Sod Sulf-Pot Chloride-Mag Cano  
lf   
(Sutab) 1.479-0.188- 0.225 gram   
tablet Discontinued 12 TAB PO   
Twice daily  3:14pm 2024   
1:01pm Take the first dose at   
3pm the day before colonoscopy,   
Take the second dose at 9pm the   
evening before colonoscopy.  
  
  
  
                                Start: 2024                 Sod Sulf-Pot C  
hloride-Mag Sulf (Sutab) 1.479-0.188- 0.225   
gram   
tablet Active 12 TAB PO Twice daily  3:14pm   
Take the first dose at 3pm the day before colonoscopy, Take the   
second dose at 9pm the evening before colonoscopy.  
   
                                                    Start: 2024  
End: 2024                                     Sod Sulf-Pot Chloride-Mag Cano  
lf (Sutab) 1.479-0.188- 0.225 gram   
tablet Discontinued 12 TAB PO Twice daily    
12:00am 2024 3:15pm Take the first dose at 3pm the day   
before colonoscopy, Take the second dose at 9pm the evening   
before colonoscopy.  
   
                                Start: 2024                 Sod Sulf-Pot C  
hloride-Mag Sulf (Sutab) 1.479-0.188- 0.225   
gram   
tablet Active 12 TAB PO Twice daily  12:00am   
Take the first dose at 3pm the day before colonoscopy, Take the   
second dose at 9pm the evening before colonoscopy.  
  
  
  
                                                    sotalol hydrochloride   
120 mg oral tablet  
(13 sources)        Antiarrhythmic      Start: 2022   take 0.5   
tablet by   
mouth twice   
daily                                   Sotalol HCl - 120   
MG Oral Tablet   
TAKE 1/2 TABLET   
TWICE A DAY   
Quantity: 90   
Refills: 3   
Ordered:   
2022   
Tomi Rodgers MD   
Start :   
2022 Active  
  
  
  
                                                    Start: 2020  
End: 2022                                     Sotalol (Sotalol Af) 120 mg   
Tablet   
Discontinued 60 MG PO Twice daily  12:00am 2022 2:17pm  
   
                                                            take 0.5 tablet by m  
outh every   
twelve hours                            Sotalol HCl 120 MG 1/2 tablet Orally   
every 12 hrs Active  
   
                                                                  
  
  
  
                                                    spironolactone 25 mg   
oral tablet  
(20 sources)                            Aldosterone   
Antagonist                              Start:   
2022                              take 0.5   
tablet by   
mouth once   
daily                                   Spironolactone 25 MG   
Oral Tablet TAKE 1/2   
TABLET BY MOUTH   
EVERYDAY Quantity:   
45 Refills: 3   
Ordered: 2022   
Tomi Rodgers MD   
Start : 2022   
Active  
  
  
  
                                                    Start: 2022  
End: 2024                         take 12.5 mg by mouth once   
daily                                   Spironolactone Discontinued 12.5 MG PO   
Daily 15  12:00am   
2024 11:27am  
  
  
  
                                                    ticagrelor 90 mg   
oral tablet  
(8 sources)                                         Start: 2019  
End: 06-                         take 1 tablet   
by mouth   
twice daily                             Ticagrelor (Brilinta)   
90 mg Tablet   
Discontinued 90 MG PO   
Twice daily 180 90   
2019   
1:00am 2019 11:44am  
   
                                                    tiZANidine 2 mg   
oral capsule  
(2 sources)                             Central alpha-2   
Adrenergic   
Agonist                                             take 1   
capsule by   
mouth every   
twenty-four   
hours                                   Zanaflex 2 MG 1   
capsule as needed   
Orally once a day   
Not-Taking  
   
                                                    vitamin b12 1   
mg/ml injectable   
solution  
(2 sources)               Vitamin B12               Start: 2019  
End: 2024                                     cyanocobalamin   
(VITAMIN B-12) 1,000   
mcg/mL soln   
Indications: B12   
deficiency Inject 1   
mL intramuscularly   
every 4 weeks. 1 mL   
2019   
Discontinued  
   
                                                    warfarin sodium 5   
mg oral tablet  
(20 sources)                            Vitamin K   
Antagonist                Start: 2019         take 1 tablet   
by mouth once   
daily                                   Warfarin Sodium 5 MG   
Oral Tablet TAKE 1   
TABLET DAILY AS   
DIRECTED by [facility] Coumadin   
Clinic Quantity: 0   
Refills: 0 Ordered:   
4-Dec-2019 DO Start :   
4-Dec-2019 Active  
  
  
  
                                                    Start: 06-  
End: 2024                                     Warfarin (Coumadin) 2 mg tab  
let   
Discontinued 1 MG PO As Directed   
2019 12:00am 2024 11:28am as directed by   
[facility]  
   
                                                    Start: 2019  
End: 2024                                     warfarin (COUMADIN) 2 mg tab  
let TAKE   
1/2 TABLET BY MOUTH ON  AND 1   
TABLET ALL OTHER DAYS OR AS DIRECTED   
[facility] 3 2019 Discontinued  
   
                                                    Start: 2019  
End: 06-                         take 1 tablet by mouth once   
daily                                   Warfarin (Coumadin) 5 mg Tablet   
Discontinued 5 MG PO Daily at 1700   
30 30 2019 1:00am   
2019 5:48pm  
  
  
  
Problems  
Active Problems  
  
  
                      Problem Classification Problem    Date       Documented Da  
te Episodic/Chronic  
   
                                                    Abdominal pain  
(20 sources)                            Left sided abdominal   
pain; Translations:   
[Unspecified abdominal   
pain]                                   Onset:   
  
3                         2019                Episodic  
   
                                                    Acute and unspecified   
renal failure  
(2 sources)                             Injury of kidney;   
Translations: [Acute   
kidney failure,   
unspecified]                            2022          Episodic  
   
                                                    Anxiety disorders  
(20 sources)                            Anxiety; Translations:   
[Anxiety disorder,   
unspecified]                            Onset:   
08-  
0                         2019                Chronic  
   
                                                    Asthma  
(4 sources)                             Asthma; Translations:   
[Unspecified asthma,   
uncomplicated]                          Onset:   
08-  
0                         08-                Chronic  
   
                                                    Cardiac dysrhythmias  
(20 sources)                            Paroxysmal atrial   
fibrillation;   
Translations: [Atrial   
fibrillation]                           Onset:   
08-  
0  
Resolved:   
  
2                                                   Chronic  
   
                                        Comment on above:   Status post radiofre  
quency ablation at ;   
   
                                                    Chronic obstructive   
pulmonary disease and   
bronchiectasis  
(20 sources)                            Chronic obstructive   
lung disease;   
Translations: [Chronic   
obstructive pulmonary   
disease, unspecified]                   Onset:   
08-  
0                                                   Chronic  
   
                                                    Complications of   
surgical procedures or   
medical care  
(1 source)                              Hypotension due to   
drugs                                                       Episodic  
   
                                                    Congestive heart   
failure;   
nonhypertensive  
(20 sources)                            Acute on chronic   
systolic heart   
failure; Translations:   
[Acute on chronic   
systolic (congestive)   
heart failure]                          Onset:   
  
2  
Resolved:   
  
2                                                   Chronic  
   
                                                    Coronary   
atherosclerosis and   
other heart disease  
(20 sources)                            Coronary   
atherosclerosis;   
Translations:   
[Coronary   
atherosclerosis of   
native coronary   
artery]                                 Onset:   
06-  
2                         2019                Chronic  
   
                                                    Coronary   
atherosclerosis and   
other heart disease  
(17 sources)                            Patient post   
percutaneous   
transluminal coronary   
angioplasty;   
Translations:   
[Percutaneous   
transluminal coronary   
angioplasty status]                     2024          Episodic  
   
                                        Comment on above:   LAD 2019;   
   
                                                    Deficiency and other   
anemia  
(19 sources)                            Iron deficiency   
anemia; Translations:   
[Iron deficiency   
anemia, unspecified]                     2024          Episodic  
   
                                                    Disorders of lipid   
metabolism  
(20 sources)                            Dyslipidemia;   
Translations: [Other   
and unspecified   
hyperlipidemia]                         Onset:   
08-  
0                         06-                Chronic  
   
                                                    Esophageal disorders  
(20 sources)                            Gastroesophageal   
reflux disease;   
Translations:   
[Gastro-esophageal   
reflux disease without   
esophagitis]                            Onset:   
08-  
0                                                   Chronic  
   
                                                    Essential hypertension  
(20 sources)                            Benign essential   
hypertension;   
Translations: [Benign   
essential   
hypertension]                           Onset:   
08-  
0  
Resolved:   
  
2                                                   Chronic  
   
                                                    Fluid and electrolyte   
disorders  
(18 sources)                            Metabolic acidosis;   
Translations:   
[Metabolic acidosis]                     2019          Episodic  
   
                                                    Genitourinary symptoms   
and ill-defined   
conditions  
(2 sources)     Dysuria                                         Episodic  
   
                                                    Hypertension with   
complications and   
secondary hypertension  
(1 source)                              Hypertensive heart   
disease with heart   
failure; Translations:   
[HTN HEART DISEASE   
W/HEART FAIL]                           Onset:   
  
3                                                   Chronic  
   
                                                    Malaise and fatigue  
(13 sources)                            Fatigue; Translations:   
[Chronic fatigue,   
unspecified]                                                Chronic  
   
                                                    Malaise and fatigue  
(9 sources)                             Asthenia;   
Translations:   
[Weakness]                              2020          Episodic  
   
                                                    Mood disorders  
(20 sources)                            Bipolar II disorder;   
Translations: [Bipolar   
II disorder]                            Onset:   
11-  
1  
Resolved:   
11-  
1                                                   Chronic  
   
                                                    Nausea and vomiting  
(12 sources)                            Nausea; Translations:   
[Nausea]                                                    Episodic  
   
                                                    Non-Hodgkin`s lymphoma  
(5 sources)                             Diffuse non-Hodgkin's   
lymphoma, large cell   
(clinical);   
Translations: [Diffuse   
large B-cell lymphoma,   
extranodal and solid   
organ sites]                            Onset:   
  
4                         2024                Chronic  
   
                                                    Occlusion or stenosis   
of precerebral   
arteries  
(4 sources)                             Carotid artery   
occlusion;   
Translations:   
[Occlusion and   
stenosis of   
unspecified carotid   
artery]                                 Onset:   
10-  
3                         2004                Chronic  
   
                                                    Osteoarthritis  
(1 source)                              Unspecified   
osteoarthritis,   
unspecified site;   
Translations:   
[UNSPECIFIED   
OSTEOARTHRITIS UNS   
SITE]                                   Onset:   
  
3                                                   Chronic  
   
                                                    Osteoporosis  
(20 sources)                            Adult idiopathic   
generalized   
osteoporosis;   
Translations: [Other   
osteoporosis without   
current pathological   
fracture]                               Onset:   
08-  
0                         08-                Chronic  
   
                                                    Other aftercare  
(17 sources)                            Drug therapy finding;   
Translations:   
[Long-term (current)   
use of other   
medications]                                                Episodic  
   
                                                    Other aftercare  
(11 sources)                            Encounter for   
therapeutic drug level   
monitoring;   
Translations:   
[Encounter for   
therapeutic drug   
monitoring]                             Onset:   
11-  
1  
Resolved:   
11-  
1                                                   Episodic  
   
                                                    Other aftercare  
(3 sources)                             Polypharmacy ;   
Translations: [Other   
long term (current)   
drug therapy]                           2019          Episodic  
   
                                                    Other aftercare  
(1 source)                              Other long term   
(current) drug   
therapy; Translations:   
[OTH LONG TERM CURRENT   
DRUG THERAPY]                           Onset:   
  
3                                                   Episodic  
   
                                                    Other and ill-defined   
heart disease  
(7 sources)                             Systolic dysfunction;   
Translations: [Heart   
disease, unspecified]                                         Chronic  
   
                                                    Other and ill-defined   
heart disease  
(3 sources)                             Left ventricular   
systolic dysfunction;   
Translations: [Heart   
disease, unspecified]                     2022          Chronic  
   
                                                    Other circulatory   
disease  
(2 sources)                             Low blood pressure;   
Translations:   
[Hypotension,   
unspecified]                            2022          Episodic  
   
                                                    Other connective   
tissue disease  
(2 sources)                             Rhabdomyolysis;   
Translations:   
[Rhabdomyolysis]                        2019          Episodic  
   
                                                    Other disorders of   
stomach and duodenum  
(1 source)                              Cyclical vomiting   
syndrome;   
Translations:   
[Cyclical vomiting   
syndrome unrelated to   
migraine]                               2024          Episodic  
   
                                                    Other disorders of   
stomach and duodenum  
(1 source)                              Cyclical vomiting   
syndrome unrelated to   
migraine;   
Translations:   
[Persistent vomiting]                     2024          Episodic  
   
                                                    Other gastrointestinal   
disorders  
(19 sources)                            Pharyngeal dysphagia;   
Translations:   
[Dysphagia, pharyngeal   
phase]                                  2024          Episodic  
   
                                                    Other gastrointestinal   
disorders  
(1 source)                              Dysphagia;   
Translations:   
[Dysphagia,   
unspecified]                            2024          Episodic  
   
                                                    Other gastrointestinal   
disorders  
(5 sources)                             Dysphagia, pharyngeal   
phase; Translations:   
[Dysphagia, pharyngeal   
phase]                                  Onset:   
  
4                         2024                Episodic  
   
                                                    Other hematologic   
conditions  
(4 sources)                             Raised cardiac enzyme   
or marker;   
Translations: [Other   
specified   
abnormalities of   
plasma proteins]                        06-          Episodic  
   
                                                    Other liver diseases  
(12 sources)                            Enzyme level -   
finding; Translations:   
[Elevated transaminase   
measurement]                            2020          Episodic  
   
                                                    Other liver diseases  
(3 sources)                             Cardiac enzymes   
abnormal;   
Translations:   
[Abnormal levels of   
other serum enzymes]                     2019          Episodic  
   
                                                    Other lower   
respiratory disease  
(3 sources)                             Hypoxia; Translations:   
[Hypoxemia]                             2022          Episodic  
   
                                                    Other lower   
respiratory disease  
(1 source)                              Dyspnea, unspecified;   
Translations: [Other   
respiratory   
abnormalities]                          2024          Episodic  
   
                                                    Other nutritional;   
endocrine; and   
metabolic disorders  
(12 sources)                            Obesity; Translations:   
[Obesity, unspecified]                                         Chronic  
   
                                                    Other nutritional;   
endocrine; and   
metabolic disorders  
(13 sources)                            Obese class II;   
Translations: [Body   
mass index (BMI)   
38.0-38.9, adult]                                           Chronic  
   
                                                    Other nutritional;   
endocrine; and   
metabolic disorders  
(1 source)                              Body mass index (BMI)   
38.0-38.9, adult                                            Chronic  
   
                                                    Other nutritional;   
endocrine; and   
metabolic disorders  
(1 source)                              Body mass index 30+ -   
obesity; Translations:   
[Body mass index (BMI)   
38.0-38.9, adult]                       2024          Chronic  
   
                                                    Other nutritional;   
endocrine; and   
metabolic disorders  
(2 sources)                             Abnormal weight loss;   
Translations: [Loss of   
weight]                                 Onset:   
  
4                         2024                Episodic  
   
                                                    Other nutritional;   
endocrine; and   
metabolic disorders  
(2 sources)                             Abnormal weight loss;   
Translations:   
[Abnormal weight loss]                     2024          Episodic  
   
                                                    Other screening for   
suspected conditions   
(not mental disorders   
or infectious disease)  
(20 sources)                            Prolonged QT interval;   
Translations:   
[Nonspecific abnormal   
electrocardiogram   
[ECG] [EKG]]                            Onset:   
  
4  
Resolved:   
11-  
1                                                   Episodic  
   
                                                    Pneumonia (except that   
caused by tuberculosis   
or sexually   
transmitted disease)  
(4 sources)                             Community acquired   
pneumonia;   
Translations:   
[Pneumonia,   
unspecified organism]                     06-          Episodic  
   
                                                    Residual codes;   
unclassified  
(6 sources)                             Tobacco user;   
Translations: [Tobacco   
use]                                    2022          Episodic  
   
                                                    Residual codes;   
unclassified  
(3 sources)                             Acute confusion;   
Translations:   
[Disorientation,   
unspecified]                            2019          Episodic  
   
                                                    Residual codes;   
unclassified  
(1 source)                              Other amnesia;   
Translations: [Memory   
loss]                                   2024          Episodic  
   
                                                    Rheumatoid arthritis   
and related disease  
(20 sources)                            Rheumatoid arthritis -   
hand joint;   
Translations:   
[Rheumatoid arthritis,   
unspecified]                            Onset:   
  
4                                                   Chronic  
   
                                                    Screening and history   
of mental health and   
substance abuse codes  
(7 sources)                             Ex-smoker;   
Translations:   
[Personal history of   
tobacco use]                                                Episodic  
   
                                                    Septicemia (except in   
labor)  
(2 sources)                             Sepsis; Translations:   
[Sepsis, unspecified   
organism]                               2019          Episodic  
   
                                                    Substance-related   
disorders  
(20 sources)                            Smoker; Translations:   
[Tobacco use disorder]                  Onset:   
11-  
1  
Resolved:   
11-  
1                                                   Chronic  
   
                                                    Thyroid disorders  
(20 sources)                            Acquired   
hypothyroidism;   
Translations:   
[Hypothyroidism,   
unspecified]                            Onset:   
08-  
0  
Resolved:   
  
2                                                   Chronic  
  
  
Past or Other Problems  
  
  
                      Problem Classification Problem    Date       Documented Da  
te Episodic/Chronic  
   
                                                    Deficiency and other   
anemia  
(3 sources)                             Anemia;   
Translations:   
[Anemia,   
unspecified]                            Onset:   
08-                08-                Episodic  
   
                                                    Other aftercare  
(1 source)                              Long term   
(current) use of   
anticoagulants;   
Translations:   
[LONG TERM   
CURRNT USE   
ANTICOAGULANTS]                         Onset:   
2022                                          Episodic  
   
                                                    Other nervous system   
disorders  
(3 sources)                             Dysphasia;   
Translations:   
[Dysphasia]                             Onset:   
08-                08-                Episodic  
   
                                                    Residual codes;   
unclassified  
(3 sources)                             Edema of lower   
extremity;   
Translations:   
[Localized   
edema]                                  Onset:   
08-                08-                Episodic  
   
                                                    Unclassified  
(4 sources)                             Well adult;   
Translations:   
[Full-term   
infant]                                 2024            
  
  
  
Results  
  
  
                          Test Name    Value        Interpretation Reference   
Range                                   Facility  
   
                                                    Amphetamine Screen Ql (U)Ord  
ered By: Imsundar Asaad on 2024   
   
                      Amphetamines Ql (U) Negative              Negative   [facility]  
   
                                                    Barbiturates [Presence] in U  
rine by Screen methodOrdered By: Imad Asaad on   
2024   
   
                                                    Barbiturates Screen Ql   
(U)             Negative                        Negative        [facility]  
   
                                                    Benzodiazepines Screen Ql (U  
)Ordered By: Imad Asaad on 2024   
   
                      Benzodiazepines Ql (U) Positive   High       Negative   [facility]  
   
                                                    Benzoylecgonine [Presence] i  
n Urine by Screen methodOrdered By: Imad Asaad on   
2024   
   
                                                    Benzoylecgonine Screen   
Ql (U)          Negative                        Negative        [facility]  
   
                                                    Cannabinoids [Presence] in U  
rine by Screen methodOrdered By: Imad Asaad on   
2024   
   
                                                    Cannabinoids Screen Ql   
(U)             Positive        High            Negative        [facility]  
   
                                        Comment on above:   These are unconfirme  
d results and should not be used for legal  
   
purposes. Drug Cut-Off Concentration: AMPH 1000 ng/mL BALJIT 200   
ng/mL KALI 200 ng/mL COCM 300 ng/mL  ng/mL PCP 25 ng/mL   
THC 20 ng/mL   
   
                                                    Drug Screen,Urineon 20  
24   
   
                                                    Amphetamine   
Screen,Urine    Negative        Normal          Negative        The Replaced by [facility]   
Physician   
Group  
   
                                        Comment on above:   Performed By: #### U  
RDS ####  
[address]   
   
                                                    Barbiturate   
Screen,Urine    Negative        Normal          Negative        The Replaced by [facility]   
Physician   
Group  
   
                                        Comment on above:   Performed By: #### U  
RDS ####  
[address]   
   
                                                    Benzodiazepines   
Screen,Urine    Positive        High            Negative        The Replaced by [facility]   
Physician   
Group  
   
                                        Comment on above:   Performed By: #### U  
RDS ####  
[address]   
   
                                                    Cannabinoid   
Screen,Urine    Positive        High            Negative        The Replaced by [facility]   
Physician   
Group  
   
                                        Comment on above:   Result Comment: Thes  
e are unconfirmed results and should not   
be   
used for  
legal purposes.  
Drug Cut-Off Concentration:  
AMPH 1000 ng/mL  
BALJIT 200 ng/mL  
KALI 200 ng/mL  
COCM 300 ng/mL  
 ng/mL  
PCP 25 ng/mL  
THC 20 ng/mL  
PERFORMED BY:  
[place] [address]  
[phone]  
PATHOLOGIST MEDICAL DIRECTOR  
KAYLAH GEORGE M.D.   
   
                                                            Performed By: #### U  
RDS ####  
[address]   
   
                      Cocaine Screen,Urine Negative   Normal     Negative   The   
Replaced by [facility]   
Physician   
[facility]  
   
                                        Comment on above:   Performed By: #### U  
RDS ####  
[address]   
   
                      Opiate Screen,Urine Negative   Normal     Negative   [facility]   
Physician   
Group  
   
                                        Comment on above:   Performed By: #### U  
RDS ####  
[address]   
   
                                                    Phencyclidine   
Screen,Urine    Negative        Normal          Negative        [facility] [facility]   
Physician   
Group  
   
                                        Comment on above:   Performed By: #### U  
RDS ####  
[place] [address] [place]   
   
                                                    Max 2024   
   
                                        L                   Specimen: [id number]   
Received:    
Status: RHINA Haider Num:   
[id number]  
Spec Type: Surgical Subm   
Dr: Francisco Baeza MD  
  
Tissues: A Esophagus   
Biopsy (ESOPHAGUS BX)  
B Colon Biopsy (TRANSV   
POLYP)  
C Colon Biopsy (DESC   
POLYP)  
D Colon Biopsy (RECTAL   
POLYPS)  
Procedures: HE/8,   
Gross/Micro L4/4  
  
  
Age/  
Patient Birth Sex   
Location Account   
Attending Physician  
  
  
Giacomo Berman 63/F    
[id number] Francisco Baeza MD  
  
  
  
SPEC NUM: [id number] RECD:   
 STATUS:   
RHINA HAIDER NUM: [id number]  
SANTIAGO: 24- SUBM DR:   
Francisco Baeza MD  
  
ENTERED:    
[facility] DR:  
  
SPEC TYPE: Surgical DEPT:   
S  
ENTERED BY: [id number] RECV   
BY: [id number]  
  
ORDERED: HE/8,   
Gross/Micro L4/4  
ORDERED: HE/8,   
Gross/Micro L4/4  
  
Pathological Diagnosis  
  
A, esophageal biopsy:  
-Squamocolumnar mucosa   
with mild chronic Walters   
esophagitis without   
glandular dysplasia, or  
any other significant   
abnormality identified  
  
B, transverse colon polyp   
biopsy:  
-Consistent with a small   
adenomatous polyp in at   
least 1 fragment  
  
C, descending colon polyp   
biopsy:  
-Fragments of tubular   
adenoma  
  
D, rectal polyp biopsy:  
-Fragments of benign   
hyperplastic polyp  
  
Clinical Information  
  
History of colon   
polyps/dysphagia  
  
  
  
  
  
  
-------------------------  
-----------------  
Specimen: [id number]   
Received:    
Status: RHINA Noris Num:   
[id number]  
Spec Type: Surgical Subm   
Dr: Francisco Baeza MD  
  
Tissues: A Esophagus   
Biopsy (ESOPHAGUS BX)  
B Colon Biopsy (TRANSV   
POLYP)  
C Colon Biopsy (DESC   
POLYP)  
D Colon Biopsy (RECTAL   
POLYPS)  
Procedures: 8,   
Gross/Micro L4/4  
-------------------------  
-----------------  
Patient: Giacomo Berman   
[id number] (Continued)  
-------------------------  
-----------------  
  
  
Specimen: [id number]   
Received:    
(Continued)  
  
  
Signed   
__________(signature on   
file)___________   
Doug Thompson MD   
24 1834  
  
  
-------------------------  
-----------------  
Specimen: [id number]   
Received:    
Status: RHINA Noris Num:   
[id number]  
Spec Type: Surgical Subm   
Dr: Francisco Baeza MD  
  
Tissues: A Esophagus   
Biopsy (ESOPHAGUS BX)  
B Colon Biopsy (TRANSV   
POLYP)  
C Colon Biopsy (DESC   
POLYP)  
D Colon Biopsy (RECTAL   
POLYPS)  
Procedures: JAYLIN/Susana Brower/Juan L4/4  
-------------------------  
-----------------  
Patient: Giacomo Berman   
[id number] (Continued)  
-------------------------  
-----------------  
  
  
Specimen: [id number]   
Received:    
(Continued)  
  
  
Gross Description  
  
Received are 4 formalin   
filled containers each   
labeled with the   
patient's name, date of  
birth and specific   
specimen site.  
  
A. Further labeled   
 esophageal biopsy  are 2   
tan mucosal tissue   
fragments measuring 0.4 x  
0.2 x 0.1 cm and 0.2 x   
0.1 x 0.1 cm, entirely   
submitted in A1.  
  
B. Further labeled   
 transverse colon polyp    
is a 0.2 x 0.2 x 0.1 cm   
tan polypoid tissue  
fragment admixed with   
mucus and possible   
vegetable matter,   
entirely submitted in B1.  
  
C. Further labeled   
 descending colon polyp    
is a 0.4 x 0.3 x 0.2 cm   
tan polypoid tissue  
fragment, entirely   
submitted in C1.  
  
D. Further labeled   
 rectal polyp  are 3 tan   
mucosal tissue fragments   
ranging from 0.2 x 0.2  
x 0.1 cm to 0.1 x 0.1 x   
0.1 cm, entirely   
submitted in D1.  
  
TW  
  
Microscopic Description  
  
Microscopic examination   
are performed supporting   
the above interpretation  
  
CPT Codes  
  
[id number]  
-------------------------  
-----------------  
  
  
  
  
  
  
  
  
  
  
  
  
  
-------------------------  
-----------------  
Specimen: [id number]   
Received:    
Status: RHINA Haider Num:   
[id number]  
Spec Type: Surgical Subm   
Dr: Francisco Baeza MD  
  
Tissues: A Esophagus   
Biopsy (ESOPHAGUS BX)  
B Colon Biopsy (TRANSV   
POLYP)  
C Colon Biopsy (DESC   
POLYP)  
D Colon Biopsy (RECTAL   
POLYPS)  
Procedures: HE/Leonarda,   
Susana/Juan L4/4  
-------------------------  
-----------------  
Patient: Giacomo Berman   
[id number] (Continued)  
-------------------------  
-----------------  
  
  
Signed   
__________(signature on   
file)___________   
Pasha-Juaquin Thompson MD   
24 1834       Normal                                  The [facility] by [facility]   
Physician   
Group  
   
                                                    Opiates [Presence] in Urine   
by Screen methodOrdered By: Francisco Baeza on 2024  
   
   
                      Opiates Screen Ql (U) Negative              Negative   [facility]  
   
                                                    Phencyclidine Screen Ql (U)O  
rdered By: Francisco Baeza on 2024   
   
                      Phencyclidine Ql (U) Negative              Negative   [facility]  
   
                                                    CNOVSPon 2024   
   
                                        CNOVSP              Visit (SP) Office   
(HEMASA)  
-------------------------  
-----  
GIACOMO BERMAN ([id number])   
1960 F  
Date Time Provider   
Department  
24 3:45 PM   
SATURNINO BUTTERFIELD  
During your visit today,   
we recorded the following   
information about you:  
Temperature Pulse   
Respiration Blood   
pressure  
97.8 degrees 72/minute   
18/minute 120/76  
Weight  
52.9 kg  
Saturnino Butterfield MD   
2024 6:27 PM Signed  
NAME: Giacomo Berman  
CLINIC NO.: [id number]  
DATE OF SERVICE: 2024 (Abtyra)  
Some elements in this   
clinic note that are   
critical to medical   
decision making  
have been carefully   
reviewed and included   
from a prior clinic note   
dated: 2024 (Rodney)  
Referring Provider: Self   
Referred  
Additional Clinicians   
involved in Giacomo Berman's care:  
DIAGNOSIS:  
History of NHL 2004  
Abnormal weight loss.  
ASSESSMENT: 63 year old   
woman with a history of   
NHL diagnosed after a   
liver  
biopsy.  
Currently is concerned   
with abnormal weight loss   
and possible recurrence   
of  
disease.  
PET/CT was negative. No   
apparent malignant cause   
of symptoms were able to   
be  
identified at this time.  
PLAN:  
RTC PRN  
_________________________  
____-  
________  
HPI:  
CASE HISTORY: Reverse   
Chronological Order  
2024 - PET/CT:  
CHEST: FDG avid   
subcentimeter right   
axillary lymph node,   
probably reactive from  
radiotracer injection in   
the right upper   
extremity. Abnormally   
dilated  
pulmonary vessels in the   
lingula, suggestive of   
pulmonary arteriovenous  
malformation.  
HEAD/NECK,   
ABDOMEN/PELVIS,   
MUSCULOSKELETAL: No FDG   
avid neoplastic process.  
Followed with Dr. Jer Goodwin for NHL  
2007 - CT CAP:  
Chest: there are some   
mild old inflammatory   
changes, but no   
adenopathy or mass  
is seen within the chest.   
No convincing change.  
A/P: Small hepatic   
abnormalities, smaller   
than before. No new   
hepatic  
abnormalities seen. No   
adenopathy. Bilateral L5   
pars defects. Small   
hiatal  
hernia. No pelvis   
abnormality identified  
2005 - CT CAP:  
Chest: The previously   
demonstrated pulmonary   
nodules are no longer   
visualized.  
Lungs are now clear. The   
enlarged mediastinal   
lymph nodes noted on the   
prior  
study have markedly   
decreased in size   
(largest is 7 x 8 mm) in   
the precarinal  
region.  
A/P: Marked decrease in   
size of the liver   
metastases. Several small   
hypodense  
lesions persist as   
described above. There   
are multiple linear areas   
of low  
attenuation within the   
liver probably secondary   
to fibrotic scars. No  
significant pelvis   
abnormality.  
2005 - CT CAP:  
Chest: No mediastinal or   
axillary lymphadenopathy.  
A/P: Multiple low areas   
of attenuation in the   
liver have decreased in   
number  
and size compared to the   
study of 2004. No abnormal mass or   
pelvis  
lymphadenopathy.  
2005 - PET/CT:  
Negative study  
2005 - Finished   
chemotherapy  
2004 - Liver,   
needle biopsy:  
- Malignant Non-Hodgkin's   
Lymphoma, diffuse large B   
cell lymphoma (WHO  
classification)  
2004 - CT A/P:  
Multiple liver masses   
suspicious for metastatic   
disease. These were also   
noted  
on 2004 chest CT   
and I do not see any   
obvious change. Small   
hiatal  
hernia. Questionable   
small ovarian cysts. This   
could be further   
evaluated with  
ultrasound. Pelvis   
otherwise unremarkable.  
2004 - CT Chest:  
Multiple bilateral upper   
lobe nodules and   
mediastinal   
lymphadenopathy with  
associated evidence of   
metastatic liver disease   
and lymphadenopathy noted   
in  
the region of the celiac   
trunk origin. The   
differential diagnosis   
includes  
lymphoproliferative   
disorder such as lymphoma   
as well as metastatic   
disease  
related to a neoplasm of   
unknown etiology. The   
most likely causes would   
be  
adenocarcinoma from   
either the breast or GI   
tract. Melanoma could   
result in  
these findings.  
Updated Visit, 2024:  
Giacomo returns today with   
Fide. Her PET scan shows   
no evidence of NHL  
recurrence. She has   
gained 1lb since her last   
visit. EGD/colonoscopy   
next week.  
She may RTC with me PRN.  
Initial Visit, 2024:  
Giacomo Berman presents   
today for a Hematology   
and Oncology evaluation.   
She is  
joined by her daughter,   
Fide. She is a 63 year   
old female who was   
diagnosed  
with Non Hodgkin's   
lymphoma with metastases   
to the liver and lung in   
. She  
completed chemotherapy   
with Dr. Jer Goodwin in   
2005 and has had no  
evidence of recurrence.  
She now reports   
unintentional weight loss   
- 70lbs in 3 months.   
Denies fever,  
chills, and night sweats.   
Endorses normal bowel   
movements. Her appetite   
has  
been stable, she is   
supplementing with Ensure   
drinks. Increasing memory  
problems recently. She   
has an EGD/Colonoscopy   
scheduled.  
She is a current smoker -   
8-10 cigarettes a day.  
She had nausea, vomiting,   
and diarrhea while taking   
a magnesium supplement in  
the past.  
She has a history or   
COPD, CHF, and AFIB. She   
has not had AFIB since   
 when  
she got the loop implant.   
She (more content not   
included)...        Normal                                  [facility] PET/CT SKULL-THIGH INITon  
 2024   
   
                                                    NM PET/CT SKULL-THIGH   
INIT                                    * * *Final Report* * *  
DATE OF EXAM: 2024   
3:57PM  
NRN 0060 - NM PET/CT   
SKULL-THIGH INIT /   
ACCESSION # [id number]  
PROCEDURE REASON: Diffuse   
large B-cell lymphoma of   
solid organ excluding  
spleen (HCC)  
  
* * * * Physician   
Interpretation * * * *  
RESULT: EXAMINATION: BODY   
FDG PET-CT  
CLINICAL HISTORY: Diffuse   
large B-cell lymphoma of   
solid organ excluding  
spleen (HCC) . Completed   
chemotherapy in   
TECHNIQUE:   
Radiopharmaceutical was   
administered IV followed   
about 60  
minutes later by PET   
imaging from eyes to   
proximal thighs. Free  
breathing, low dose CT of   
the same body region was   
acquired without IV  
contrast for attenuation   
correction and anatomic   
localization.  
* CT Dose-Length Product   
(DLP): 154 mGy*cm  
* CT Dose Reduction   
Employed: Yes  
* Blood glucose (mg/dL):   
86  
* Radiopharmaceutical   
Activity: 6.4 mCi  
* Radiopharmaceutical:   
[id number]   
(FDG)  
* All reported   
standardized uptake   
values represent maximum   
SUV (SUVmax)  
per body weight, unless   
otherwise specified.  
COMPARISON: No previous   
FDG PET/CT available  
CORRELATION: No relevant   
imaging available  
RESULT:  
REFERENCES:  
SUV reference values:  
* Blood pool (descending   
aorta) activity: SUVmax   
2.2  
* Background liver   
activity: SUVmax 2.3  
 (topogram) images:   
No additional findings.  
Notes and limitations:  
* Standardized uptake   
values indicate the   
highest activity   
concentration  
(SUVmax) at a given   
location but can be   
variable and are not   
absolute.  
*   
Physiologic/non-neoplasti  
c uptake is common in the   
brain, extraocular  
muscles, oral cavity,   
tonsils, salivary glands,   
vocal cords, myocardium,  
liver, GI tract, urinary   
tract, and bone marrow   
among others. Certain  
regions and organ systems   
can have more intense   
uptake, which could  
confound or obscure some   
pathology.  
* Unenhanced imaging is   
limited for the   
evaluation of some   
pathology and  
the acquired CT was not   
designed to produce or   
replace diagnostic CT   
scan  
quality.  
* PET-CT is often not   
sensitive for pulmonary   
nodules less than 8 mm.  
HEAD AND NECK:  
Imaged Head: No abnormal   
uptake.  
Neck and Lymph Nodes: No   
abnormal uptake.  
Thyroid: No abnormal   
uptake.  
CHEST:  
Lungs and Airways: No   
abnormal uptake.   
Abnormally dilated   
pulmonary  
vessels in the anterior   
lingula.  
Pleura and Pericardium:   
No abnormal uptake.  
Cardiovascular: No   
abnormal uptake.  
Mediastinum and Lymph   
Nodes: Subcentimeter   
right axillary lymph node   
with  
FDG uptake, 0.5 cm, SUV   
4.5. No other FDG avid   
adenopathy.  
ABDOMEN AND PELVIS:  
Hepatobiliary: No   
abnormal uptake.  
Spleen: No abnormal   
uptake.  
Pancreas: No abnormal   
uptake.  
Adrenals: No abnormal   
uptake.  
Urinary Tract: No   
abnormal uptake.  
GI Tract: No abnormal   
uptake.  
Peritoneum: No abnormal   
uptake.  
Vasculature: No abnormal   
uptake.  
Retroperitoneum and Lymph   
Nodes: No abnormal   
uptake.  
Pelvis: No abnormal   
uptake.  
MUSCULOSKELETAL:  
Osseous: No abnormal   
uptake. Degenerative   
uptake in the spine,   
shoulders  
and hip joints  
Soft Tissues: No abnormal   
uptake.  
IMPRESSION:  
HEAD/NECK:  
* No FDG avid neoplastic   
process.  
CHEST:  
* FDG avid subcentimeter   
right axillary lymph   
node, probably reactive  
from radiotracer   
injection in the right   
upper extremity.  
* Abnormally dilated   
pulmonary vessels in the   
lingula, suggestive of  
pulmonary arteriovenous   
malformation.  
ABDOMEN/PELVIS:  
* No FDG avid neoplastic   
process.  
MUSCULOSKELETAL:  
* No FDG avid neoplastic   
process.  
Transcribe Date/Time: 2024 2:29P  
Dictated by: ARTEMIO BROWER MD  
This examination was   
interpreted and the   
report reviewed and  
electronically signed by:  
ARETMIO BROWER MD on 2024 2:48PM EST  
Thank you for allowing us   
to participate in the   
care of your patient.  
Should there be any   
questions regarding this   
interpretation, please   
call  
[phone].  
If you are unable to   
reach us at the number   
above,  
please feel free to   
contact [facility]   
eRadiology at   
[phone].  
[id number] Normal                                  [facility]  
   
                                                    CNPNon 2024   
   
                                        CNPN                Telephone (HEMASA)  
-------------------------  
-----  
CULLENGIACOMO ([id number])   
1960 F  
Date Time Provider   
Department  
24 JEN SOSA  
During your visit today,   
we recorded the following   
information about you:  
Jen Sosa RN   
2024 2:16 PM Signed  
pt having colonoscopy/EGD   
24. Pt needs to hold   
Eliquis and Jardiance for   
4  
days prior.  
Art: Please advise  
Saturnino Saba MD   
2024 8:32 AM Signed  
Agree thanks  
Brittany Perez RN   
2024 9:15 AM Signed  
Left message on VM that   
Dr. Cordova agreeable with   
plan. Informed her to   
call back  
with further questions.   
Left phone number to .  
Brittany Perez RN  
Allergies As of Date:   
2024 Noted Allergy   
Reaction  
PROCHLORPERAZINE   
10/09/2003 5 -   
Intolerance  
Comments: compazine  
Date Reviewed: 2019  
Reviewed by: Mirza Molina   
(Ma) - Fully Assessed  
Prescriptions as of   
2024  
- magnesium glycinate 100   
mg magnesium capsule  
Take 1 capsule by mouth   
once daily.  
- ALPRAZolam 1 mg   
dissolvable tablet  
Take 1 mg by mouth once   
daily.  
- apixaban (ELIQUIS) 5 mg   
tab(s)  
Take by mouth two times a   
day.  
- empagliflozin   
(JARDIANCE) 25 mg tablet  
Take 25 mg by mouth daily   
with breakfast.  
- furosemide (LASIX) 20   
mg tablet  
Take 20 mg by mouth once   
daily.  
- metoprolol succinate ER   
(TOPROL XL) 25 mg 24 hr   
tablet  
Take 12.5 mg by mouth   
once daily.  
- pantoprazole DR   
(PROTONIX) 40 mg tablet  
Take 40 mg by mouth once   
daily.  
- potassium chloride   
(KLOR-CON M10 ORAL)  
Take 10 mEq by mouth once   
daily.  
- amitriptyline (ELAVIL)   
25 mg tablet  
Take 25 mg by mouth.  
- albuterol HFA   
(PROVENTIL HFA, VENTOLIN   
HFA) 90 mcg/actuation   
inhaler  
Inhale 2 Puffs as   
instructed.  
- atorvastatin (LIPITOR)   
40 mg tablet  
Take 40 mg by mouth daily   
at bedtime.  
- ASPIRIN 81MG TABLET  
Take one (1) tablet daily   
.  
Problem List As Of Date   
2024 Noted Resolved  
CAROTID ART OCCL-NO   
INFARCT [I65.29]   
10/27/2003  
IMMUNOLOGICAL FIND OTHR   
OR UNSPEC [R89.4]   
2004  
RHEUMATOID ARTHRITIS   
[M06.9] 2004  
Bipolar II disorder (HCC)   
[F31.81] 2024  
Encounter Number:   
[id number]  
Encounter Status:Closed   
by BRITTANY PEREZ on   
24             Normal                                  [facility]  
   
                                                    Cholesterol [Mass/Vol]on    
   
                                                    Interpretation and   
review of laboratory   
results         Normal                                          [facility]  
   
                                                    Coagulation factor V activit  
y actual/normal Coag (PPP) [Relative time]on   
2024   
   
                                                    Interpretation and   
review of laboratory   
results         Abnormal                                        [facility]  
   
                                                    Coagulation factor VIII acti  
vity actual/normal Coag (PPP) [Relative time]Ordered  
By:   
Malissa Jaquez on 2024   
   
                                                    Interpretation and   
review of laboratory   
results         Abnormal                                        [facility]  
   
                                                    Comprehensive metabolic 2000  
 panelon 2024   
   
                          Albumin [Mass/Vol] 3.8 g/dL     Low          3.9 - 4.9  
   
g/dL                                    [facility]  
   
                                                    ALP [Catalytic   
activity/Vol]       123 U/L                                 34 - 123   
U/L                                     [facility]  
   
                                                    ALT [Catalytic   
activity/Vol]   9 U/L                           7 - 38 U/L      [facility]  
   
                          Anion gap [Moles/Vol] 12 mmol/L                 8 - 15  
   
mmol/L                                  [facility]  
   
                                                    AST [Catalytic   
activity/Vol]       16 U/L                                  13 - 35   
U/L                                     [facility]  
   
                          Bilirubin [Mass/Vol] 0.4 mg/dL                 0.2 - 1  
.3   
mg/dL                                   [facility]  
   
                          Calcium [Mass/Vol] 10.0 mg/dL                8.5 - 10.  
2   
mg/dL                                   [facility]  
   
                          Chloride [Moles/Vol] 103 mmol/L                98 - 10  
7   
mmol/L                                  [facility]  
   
                          CO2 [Moles/Vol] 24 mmol/L                 22 - 30   
mmol/L                                  [facility]  
   
                          Creatinine [Mass/Vol] 0.78 mg/dL                0.58 -  
   
0.96 mg/dL                              [facility]  
   
                                                    GFR/1.73 sq   
M.predicted among   
non-blacks MDRD   
(S/P/Bld) [Vol   
rate/Area]      85 mL/min/{1.73_m2}                 - PINF          [facility]  
   
                                        Comment on above:   Estimated Glomerular  
 Filtration Rate (eGFR) is calculated   
using   
the  CKD-EPI creatinine equation. This equation utilizes   
serum creatinine, sex, and age as parameters. The creatinine   
assay has traceable calibration to isotope dilution-mass   
spectrometry. Refer to KDIGO guidelines for clinical   
interpretation. In patients with unstable renal function, e.g.   
those with acute kidney injury, the eGFR may not accurately   
reflect actual GFR.   
   
                          Glucose [Mass/Vol] 92 mg/dL                  74 - 99   
mg/dL                                   [facility]  
   
                                        Comment on above:   The American Diabete  
s Association (ADA) provides guidance for   
cutoff values for fasting glucose and random glucose. The ADA   
defines fasting as no caloric intake for at least 8 hours.   
Fasting plasma glucose results between 100 to 125 mg/dL   
indicate increased risk for diabetes (prediabetes).  
Fasting plasma glucose results greater than or equal to 126   
mg/dL meet the criteria for diagnosis of diabetes. In the   
absence of unequivocal hyperglycemia, results should be   
confirmed by repeat testing. In a patient with classic symptoms   
of hyperglycemia or hyperglycemic crisis, random plasma glucose   
results greater than or equal to 200 mg/dL meet the criteria   
for diagnosis of diabetes.  
Reference: Standards of Medical Care in Diabetes 2016, American   
Diabetes Association. Diabetes Care. 2016.39(Suppl 1).  
  
   
   
                                                    Interpretation and   
review of laboratory   
results         Abnormal                                        [facility]  
   
                          Potassium [Moles/Vol] 3.7 mmol/L                3.7 -   
5.1   
mmol/L                                  [facility]  
   
                          Protein [Mass/Vol] 8.0 g/dL                  6.3 - 8.0  
   
g/dL                                    [facility]  
   
                          Sodium [Moles/Vol] 139 mmol/L                136 - 144  
   
mmol/L                                  [facility]  
   
                                                    Urea nitrogen   
[Mass/Vol]          6 mg/dL             Low                 7 - 21   
mg/dL                                   [facility]  
   
                                                    FACTOR II:C ASSAYon 20  
24   
   
                                                    Prothrombin activity   
actual/normal Coag   
(PPP) [Relative time] 95 %                            77 - 151 %      [facility]  
   
                                                    FACTOR V:C ASSAYon   
4   
   
                                                    Coagulation factor V   
activity actual/normal   
Coag (PPP) [Relative   
time]           72 %            Low             73 - 139 %      [facility]  
   
                                                    FACTOR VIII:C ASSAYOrdered B  
y: Malissa Leonid on 2024   
   
                                                    Coagulation factor   
VIII activity   
actual/normal Coag   
(PPP) [Relative time] 261 %           High            50 - 173 %      [facility]  
   
                                        Comment on above:   The factor VIII clot  
table activity level is elevated. This can  
  
be observed during the acute phase response. Persistent   
elevation of factor VIII, however, has been shown to be a risk   
factor for venous thrombosis. Suggest rechecking the factor   
VIII level in 1-2 months.   
   
                                                    FERRITINon 2024   
   
                          Ferritin [Mass/Vol] 124.0 ng/mL               14.7 -   
205.1   
ng/mL                                   [facility]  
   
                                                    FOLATE, SERUMon 2024   
   
                          Folate [Mass/Vol] 5.8 ng/mL                 4.7 - PINF  
   
ng/mL                                   [facility]  
   
                                                    Ferritin [Mass/Vol]on 2024   
   
                                                    Interpretation and   
review of laboratory   
results         Normal                                          [facility]  
   
                                                    Iron and Iron binding capaci  
ty panelon 2024   
   
                                                    Interpretation and   
review of laboratory   
results         Abnormal                                        [facility]  
   
                          Iron [Mass/Vol] 42 ug/dL                  41 - 186   
ug/dL                                   [facility]  
   
                                                    Iron binding capacity   
[Mass/Vol]          283 ug/dL                               232 - 386   
ug/dL                                   [facility]  
   
                                                    Iron/TIBC [Molar   
ratio]              14.8 %              Low                 15.0 -   
57.0 %                                  [facility]  
   
                                                    LACTATE DEHYDROGENASEOrdered  
 By: Cherelle Quintana on 2024   
   
                                                    LDH [Catalytic   
activity/Vol]       233 U/L             High                135 - 214   
U/L                                     [facility]  
   
                                        Comment on above:   Hemolysis present. T  
he origin of the hemolysis, in vitro   
versus   
an in vivo hemolytic process, cannot be distinguished via this   
assay alone. In vitro hemolysis may lead to non-physiological   
(spurious) elevation in lactate dehydrogenase (LDH) results.   
The  
result should be interpreted in context of the clinical setting   
and other test results. Suggest reorder as clinically   
indicated.  
   
   
                                                    LDH [Catalytic activity/Vol]  
Ordered By: Cherelle Quintana on 2024   
   
                                                    Interpretation and   
review of laboratory   
results         Abnormal                                        [facility]  
   
                                                    MAGNESIUMon 2024   
   
                          Magnesium [Mass/Vol] 2.0 mg/dL                 1.7 - 2  
.3   
mg/dL                                   [facility]  
   
                                                    Magnesium [Mass/Vol]on    
   
                                                    Interpretation and   
review of laboratory   
results         Normal                                          [facility]  
   
                                                    No Panel Informationon    
   
                                                    Interpretation and   
review of laboratory   
results         Normal                                          [facility]  
   
                                                    PHOSPHORUS INORGANICon    
   
                          Phosphate [Mass/Vol] 3.9 mg/dL                 2.7 - 4  
.8   
mg/dL                                   [facility]  
   
                                                    PREALBUMINon 2024   
   
                          Prealbumin [Mass/Vol] 21 mg/dL                  17 - 3  
6   
mg/dL                                   [facility]  
   
                                                    Phosphate [Mass/Vol]on    
   
                                                    Interpretation and   
review of laboratory   
results         Normal                                          [facility]  
   
                                                    Prealbumin [Mass/Vol]on    
   
                                                    Interpretation and   
review of laboratory   
results         Normal                                          [facility]  
   
                                                    Prothrombin activity actual/  
normal Coag (PPP) [Relative time]on 2024   
   
                                                    Interpretation and   
review of laboratory   
results         Normal                                          [facility]  
   
                                                    TOTAL CHOLESTEROLon 20   
   
                          Cholesterol [Mass/Vol] 142 mg/dL                 NINF   
- 200   
mg/dL                                   [facility]  
   
                                        Comment on above:   <200 mg/dL, Desirabl  
e  
200-239 mg/dL, Borderline high  
>239 mg/dL, High  
  
Reference:  
1. National Cholesterol Education Program ATP III Guideline   
At-A-Glance Quick Desk Reference: National Heart, Lung, and   
Blood [place]. National Institutes of Health. 2001: NIH   
Publication No. .  
   
   
                                                    VITAMIN B12on 2024   
   
                                                    Cobalamin (Vitamin   
B12) [Mass/Vol]     283 pg/mL                               232 - 1245   
pg/mL                                   [facility]  
   
                                                    Automated basophil %on    
   
                                                    Basophils/100 WBC   
(Bld)           0.6 %                                           [facility]  
   
                                        Comment on above:   Order Comment: Speci  
men Type: BLOOD SPECIMEN  
Ordering Facility: [facility]  
Address: [address] [place] [address]   
   
                                                            Performed By: #### 5  
7021-8 ####  
[facility]  
CLIA [id number]  
[address] [place] [address]   
   
                                                    Automated basophil counton 0  
2024   
   
                                                    Basophils (Bld)   
[#/Vol]         0.04 10*3/uL                    <0.11           [facility]  
   
                                        Comment on above:   Order Comment: Speci  
men Type: BLOOD SPECIMEN  
Ordering Facility: [facility]  
Address: [address] [place] [address]   
   
                                                            Performed By: #### 5  
7021-8 ####  
[facility]  
CLIA [id number]  
[address] [place] [address]   
   
                                                    Automated blood monocyte cou  
nton 2024   
   
                                                    Monocytes (Bld)   
[#/Vol]         0.46 10*3/uL                    <0.87           [facility]  
   
                                        Comment on above:   Order Comment: Speci  
men Type: BLOOD SPECIMEN  
Ordering Facility: [facility]  
Address: [address] [place] [address]   
   
                                                            Performed By: #### 5  
7021-8 ####  
[facility]  
CLIA [id number]  
[address] [place] [address]   
   
                                                    Automated eosinophil %on    
   
                                                    Eosinophils/100 WBC   
(Bld)           2.4 %                                           [facility]  
   
                                        Comment on above:   Order Comment: Speci  
men Type: BLOOD SPECIMEN  
Ordering Facility: [facility]  
Address: [address] [place] [address]   
   
                                                            Performed By: #### 5  
7021-8 ####  
[facility]  
CLIA [id number]  
[address] [place] [address]   
   
                                                    Automated monocyte %on    
   
                                                    Monocytes/100 WBC   
(Bld)           6.9 %                                           [facility]  
   
                                        Comment on above:   Order Comment: Speci  
men Type: BLOOD SPECIMEN  
Ordering Facility: [facility]  
Address: [address] [place] [address]   
   
                                                            Performed By: #### 5  
7021-8 ####  
[facility]  
CLIA [id number]  
[address] [place] [address]   
   
                                                    Automated neutrophil %on    
   
                                                    Neutrophils/100 WBC   
(Bld)           51.6 %                                          [facility]  
   
                                        Comment on above:   Order Comment: Speci  
men Type: BLOOD SPECIMEN  
Ordering Facility: [facility]  
Address: [address] [place] [address]   
   
                                                            Performed By: #### 5  
7021-8 ####  
[facility]  
CLIA [id number]  
[address] [place] [address]   
   
                                                    Blood manual differential co  
mment interpretation narrativeon 2024   
   
                                                    Manual differential   
comment Bud (Bld)   
[Interp]        Auto                                            [facility]  
   
                                                    CBC W Auto Diff Bldon 2024   
   
                                                    Eosinophils (Bld)   
[#/Vol]         0.16 10*3/uL                    <0.46           [facility]  
   
                                        Comment on above:   Order Comment: Speci  
men Type: BLOOD SPECIMEN  
Ordering Facility: [facility]  
Address: [address] [place] [address]   
   
                                                            Performed By: #### 5  
7021-8 ####  
[facility]  
CLIA [id number]  
[address] [place] [address]   
   
                                                    Immature   
granulocytes/100 WBC   
(Bld)           0.2 %                                           [facility]  
   
                                        Comment on above:   Order Comment: Speci  
men Type: BLOOD SPECIMEN  
Ordering Facility: [facility]  
Address: [address] [id number]   
   
                                                            Performed By: #### 5  
7021-8 ####  
[facility]  
CLIA [id number]  
[address] [place] [address]   
   
                                                    CBC W Auto Differential pane  
l (Bld)on 2024   
   
                                                    Basophils (Bld)   
[#/Vol]         0.04 10*3/uL                    [facility]            [facility]  
   
                                                    Basophils/100 WBC   
(Bld)           0.6 %                                           [facility]  
   
                                                    Differential cell   
count method Nom (Bld) Auto                                            [facility]  
   
                                                    Eosinophils (Bld)   
[#/Vol]         0.16 10*3/uL                    [facility]  
   
                                                    Eosinophils/100 WBC   
(Bld)           2.4 %                                           [facility]  
   
                                                    Erythrocyte   
distribution width   
(RBC) [Ratio]       14.6 %                                  11.5 -   
15.0 %                                  [facility]  
   
                                                    Hematocrit (Bld)   
[Volume fraction]   38.8 %                                  36.0 -   
46.0 %                                  [facility]  
   
                                                    Hemoglobin (Bld)   
[Mass/Vol]          12.2 g/dL                               11.5 -   
15.5 g/dL                               [facility]  
   
                                                    Immature granulocytes   
(Bld) [#/Vol]                                   [facility]            [facility]  
   
                                                    Immature   
granulocytes/100 WBC   
(Bld)           0.2 %                                           [facility]  
   
                                                    Lymphocytes (Bld)   
[#/Vol]         2.55 10*3/uL                                    [facility]  
   
                                                    Lymphocytes/100 WBC   
(Bld)           38.3 %                                          [facility]  
   
                                                    MCH (RBC) [Entitic   
mass]               26.7 pg                                 26.0 -   
34.0 pg                                 [facility]  
   
                          MCHC (RBC) [Mass/Vol] 31.4 g/dL                 30.5 -  
   
36.0 g/dL                               [facility]  
   
                                                    MCV (RBC) [Entitic   
vol]                84.9 fL                                 80.0 -   
100.0 fL                                [facility]  
   
                                                    Monocytes (Bld)   
[#/Vol]         0.46 10*3/uL                    NINF            [facility]  
   
                                                    Monocytes/100 WBC   
(Bld)           6.9 %                                           [facility]  
   
                                                    Neutrophils (Bld)   
[#/Vol]         3.43 10*3/uL                                    [facility]  
   
                                                    Neutrophils/100 WBC   
(Bld)           51.6 %                                          [facility]  
   
                                                    Nucleated RBC (Bld)   
[#/Vol]                                         NINF            [facility]  
   
                                                    Nucleated RBC/100 WBC   
(Bld) [Ratio]   0.0 %                           /100 WBC        [facility]  
   
                                                    Platelet mean volume   
(Bld) [Entitic vol] 10.2 fL                                 9.0 - 12.7   
fL                                      [facility]  
   
                                                    Platelets (Bld)   
[#/Vol]         355 10*3/uL                                     [facility]  
   
                          RBC (Bld) [#/Vol] 4.57 10*6/uL              3.90 -   
5.20 m/uL                               [facility]  
   
                      WBC (Bld) [#/Vol] 6.65 10*3/uL                       [facility]  
   
                                                    Differential cell   
count method Nom (Bld) Auto            Normal                          [facility]  
   
                                        Comment on above:   Order Comment: Speci  
men Type: BLOOD SPECIMEN  
Ordering Facility: [facility]  
Address: [address] [place] [address]   
   
                                                            Performed By: #### 5  
7021-8 ####  
[facility]  
CLIA [id number]  
[address] [place] [address]   
   
                                                    Immature granulocytes   
(Bld) [#/Vol]   10*3/uL         Normal          <0.10           [facility]  
   
                                        Comment on above:   Order Comment: Speci  
men Type: BLOOD SPECIMEN  
Ordering Facility: [facility]  
Address: [address] [place] [address]   
   
                                                            Performed By: #### 5  
7021-8 ####  
[facility]  
CLIA [id number]  
[address] [place] [address]   
   
                                                    Nucleated RBC/100 WBC   
(Bld) [Ratio]   0.0 /100 WBC    Normal                          [facility]  
   
                                        Comment on above:   Order Comment: Speci  
men Type: BLOOD SPECIMEN  
Ordering Facility: [facility]  
Address: [address] [place] [address]   
   
                                                            Performed By: #### 5  
7021-8 ####  
[facility]  
CLIA [id number]  
[address] [place] [address]   
   
                      WBC (Bld) [#/Vol] 6.65 10*3/uL Normal     3.70-11.00 [facility]  
   
                                        Comment on above:   Order Comment: Speci  
men Type: BLOOD SPECIMEN  
Ordering Facility: [facility]  
Address: [address] [place], [place] [address] [address]   
   
                                                            Performed By: #### 5  
7021-8 ####  
[facility]  
CLIA [id number]  
[address] [place] [address]   
   
                                                    CNOVSPon 2024   
   
                                        CNOVSP              Visit (SP) Office   
(HEMASA)  
-------------------------  
-----  
GIACOMO BERMAN ([id number])   
1960 F  
Date Time Provider   
Department  
24 4:00 PM SATURNINO BUTTERFIELD  
During your visit today,   
we recorded the following   
information about you:  
Temperature Pulse   
Respiration Blood   
pressure  
97.6 degrees 77/minute   
16/minute 138/81  
Weight  
52.5 kg  
Saturnino Butterfield MD   
2024 3:06 PM Signed  
NAME: Giacomo Berman  
[facility] NO.: [id number]  
DATE OF SERVICE: 2024 (Rodney)  
Referring Provider: Self   
Referred  
Consultation requested by   
Self Referred for an   
opinion regarding MsEvans Carsonna BOBBY Berman, and my final   
recommendations will be   
communicated back to the   
requesting  
physician by way of   
shared medical record or   
letter via US mail.  
Additional Clinicians   
involved in Giacomo Berman's care:  
DIAGNOSIS:  
History of NHL 2004  
Abnormal weight loss.  
ASSESSMENT: 63 year old   
woman with a history of   
NHL diagnosed after a   
liver  
biopsy.  
Currently is concerned   
with abnormal weight loss   
and possible recurrence   
of  
disease.  
PLAN:  
PET/CT when approved  
RTC after to review scan   
and today's labs  
_________________________  
____-  
________  
HPI:  
CASE HISTORY: Reverse   
Chronological Order  
2007 - CT CAP:  
Chest: there are some   
mild old inflammatory   
changes, but no   
adenopathy or mass  
is seen within the chest.   
No convincing change.  
A/P: Small hepatic   
abnormalities, smaller   
than before. No new   
hepatic  
abnormalities seen. No   
adenopathy. Bilateral L5   
pars defects. Small   
hiatal  
hernia. No pelvis   
abnormality identified  
2005 - CT CAP:  
Chest: The previously   
demonstrated pulmonary   
nodules are no longer   
visualized.  
Lungs are now clear. The   
enlarged mediastinal   
lymph nodes noted on the   
prior  
study have markedly   
decreased in size   
(largest is 7 x 8 mm) in   
the precarinal  
region.  
A/P: Marked decrease in   
size of the liver   
metastases. Several small   
hypodense  
lesions persist as   
described above. There   
are multiple linear areas   
of low  
attenuation within the   
liver probably secondary   
to fibrotic scars. No  
significant pelvis   
abnormality.  
2005 - CT CAP:  
Chest: No mediastinal or   
axillary lymphadenopathy.  
A/P: Multiple low areas   
of attenuation in the   
liver have decreased in   
number  
and size compared to the   
study of 2004. No abnormal mass or   
pelvis  
lymphadenopathy.  
2005 - PET/CT:  
Negative study  
2005 - Finished   
chemotherapy  
2004 - Liver,   
needle biopsy:  
- Malignant Non-Hodgkin's   
Lymphoma, diffuse large B   
cell lymphoma (WHO  
classification)  
2004 - CT A/P:  
Multiple liver masses   
suspicious for metastatic   
disease. These were also   
noted  
on 2004 chest CT   
and I do not see any   
obvious change. Small   
hiatal  
hernia. Questionable   
small ovarian cysts. This   
could be further   
evaluated with  
ultrasound. Pelvis   
otherwise unremarkable.  
2004 - CT Chest:  
Multiple bilateral upper   
lobe nodules and   
mediastinal   
lymphadenopathy with  
associated evidence of   
metastatic liver disease   
and lymphadenopathy noted   
in  
the region of the celiac   
trunk origin. The   
differential diagnosis   
includes  
lymphoproliferative   
disorder such as lymphoma   
as well as metastatic   
disease  
related to a neoplasm of   
unknown etiology. The   
most likely causes would   
be  
adenocarcinoma from   
either the breast or GI   
tract. Melanoma could   
result in  
these findings.  
Initial Visit, 2024:  
Giacomo Berman presents   
today for a Hematology   
and Oncology evaluation.   
She is  
joined by her daughter,   
Fide. She is a 63 year   
old female who was   
diagnosed  
with Non Hodgkin's   
lymphoma with metastases   
to the liver and lung in   
. She  
completed chemotherapy in   
2005 and has had no   
evidence of recurrence.  
She now reports   
unintentional weight loss   
- 70lbs in 3 months.   
Denies fever,  
chills, and night sweats.   
Endorses normal bowel   
movements. Her appetite   
has  
been stable, she is   
supplementing with Ensure   
drinks. Increasing memory  
problems recently. She   
has an EGD/Colonoscopy   
scheduled.  
She is a current smoker -   
8-10 cigarettes a day.  
She had nausea, vomiting,   
and diarrhea while taking   
a magnesium supplement in  
the past.  
She has a history or   
COPD, CHF, and AFIB. She   
has not had AFIB since   
 when  
she got the loop implant.   
She takes Eliquis for a   
history of CVA in .   
She  
mentions a history of   
delirium when admitted in   
hospitals.  
_________________________  
____-  
________  
REVIEW OF SYSTEMS  
Per HPI and otherwise   
negative by full review   
of organ systems.  
_________________________  
____-  
________  
ECOG PERFORMANCE STATUS:   
1  
PHYSICAL EXAMINATION:  
Vitals: /81   Pulse   
77   Temp (Src) 97.6   
(Temporal)   Resp 16   Wt   
115 lb  
11.9 oz (52.5kg)   SpO2   
97%  
Body surface area is 1.54   
meters squared.  
Exam limited to gross   
visualization whe (more   
content not included)... Normal                                  [facility]  
   
                                                    Cholest SerPl-mCncon   
024   
   
                      Cholesterol [Mass/Vol] 142 mg/dL             <200       [facility]  
   
                                        Comment on above:   <200 mg/dL, Desirabl  
e 200-239 mg/dL, Borderline high>239   
mg/dL,   
HighReference:1. National Cholesterol Education Program ATP III   
Guideline At-A-Glance Quick Desk Reference: National Heart,   
Lung, and Blood [place]. National Institutes of Health. 2001:   
[facility] Publication No. .   
   
                                                            Order Comment: Speci  
men Type: BLOOD SPECIMEN  
Ordering Facility: [facility]  
Address: [address] [place] [address]   
   
                                                            Result Comment: <200  
 mg/dL, Desirable  
200-239 mg/dL, Borderline high  
>239 mg/dL, High  
Reference:  
1. National Cholesterol Education Program ATP III Guideline   
At-A-Glance Quick Desk Reference: National Heart, Lung, and   
Blood [place]. National Institutes of Health. 2001: NIH   
Publication No. .   
   
                                                            Performed By: #### 2  
276-4, 2093-3, 02119-3 ####  
[facility]  
CLIA [id number]  
[address] [place] [address] [id number] [place] [address] [place]   
   
                                                    Comp Metab 2000 Pnl SerPlon   
2024   
   
                      Albumin [Mass/Vol] 3.8 g/dL   Low        3.9-4.9    [facility]  
   
                                        Comment on above:   Order Comment: Speci  
men Type: BLOOD SPECIMEN  
Ordering Facility: [facility]  
Address: [address] [place] [address]   
   
                                                            Performed By: #### 1  
9123-9, 2532-0, 2777-1, 78498-9 ####  
[facility]  
CLIA [id number]  
[address] [place] [address]   
   
                                                    ALP [Catalytic   
activity/Vol]   123 U/L                                   [facility]  
   
                                        Comment on above:   Order Comment: Speci  
men Type: BLOOD SPECIMEN  
Ordering Facility: [facility]  
Address: [address] [place] [address]   
   
                                                            Performed By: #### 1  
9123-9, 2532-0, 2777-1, 80080-5 ####  
[facility]  
CLIA [id number]  
[address] [place] [address]   
   
                                                    ALT [Catalytic   
activity/Vol]   9 U/L                           7-38            [facility]  
   
                                        Comment on above:   Order Comment: Speci  
men Type: BLOOD SPECIMEN  
Ordering Facility: [facility]  
Address: [address] [place] [address]   
   
                                                            Performed By: #### 1  
9123-9, 2532-0, 2777-1, 61835-8 ####  
[facility]  
CLIA [id number]  
[address] [place] [address]   
   
                                                    AST [Catalytic   
activity/Vol]   16 U/L                          13-35           [facility]  
   
                                        Comment on above:   Order Comment: Speci  
men Type: BLOOD SPECIMEN  
Ordering Facility: [facility]  
Address: [address] [place] [address]   
   
                                                            Performed By: #### 1  
9123-9, 2532-0, 2777-1, 50154-8 ####  
[facility]  
CLIA [id number]  
[address] [place] [address]   
   
                      Bilirubin [Mass/Vol] 0.4 mg/dL             0.2-1.3    [facility]  
   
                                        Comment on above:   Order Comment: Speci  
men Type: BLOOD SPECIMEN  
Ordering Facility: [facility]  
Address: [address] [place] [address]   
   
                                                            Performed By: #### 1  
9123-9, 2532-0, 2777-1, 05179-0 ####  
[facility]  
CLIA [id number]  
[address] [place] [address]   
   
                      Calcium [Mass/Vol] 10.0 mg/dL            8.5-10.2   [facility]  
   
                                        Comment on above:   Order Comment: Speci  
men Type: BLOOD SPECIMEN  
Ordering Facility: [facility]  
Address: [address] [place] [address]   
   
                                                            Performed By: #### 1  
9123-9, 2532-0, 2777-1, 67860-2 ####  
[facility]  
CLIA [id number]  
[address] [place] [address]   
   
                      Chloride [Moles/Vol] 103 mmol/L                 [facility]  
   
                                        Comment on above:   Order Comment: Speci  
men Type: BLOOD SPECIMEN  
Ordering Facility: [facility]  
Address: [address] [place] [address]   
   
                                                            Performed By: #### 1  
9123-9, 2532-0, 2777-1, 25513-7 ####  
[facility]  
CLIA [id number]  
[address] [place] [address]   
   
                      CO2 [Moles/Vol] 24 mmol/L             22-30      [facility]  
   
                                        Comment on above:   Order Comment: Speci  
men Type: BLOOD SPECIMEN  
Ordering Facility: [facility]  
Address: [address] [place] [address]   
   
                                                            Performed By: #### 1  
9123-9, 2532-0, 7-1, 57440-2 ####  
[facility]  
CLIA [id number]  
[address] [place] [address]   
   
                      Creatinine [Mass/Vol] 0.78 mg/dL            0.58-0.96  [facility]  
   
                                        Comment on above:   Order Comment: Speci  
men Type: BLOOD SPECIMEN  
Ordering Facility: [facility]  
Address: [address] [place] [address]   
   
                                                            Performed By: #### 1  
9123-9, 2532-0, 2777-1, 34440-3 ####  
[facility]  
CLIA [id number]  
[address] [place] [address]   
   
                      Glucose [Mass/Vol] 92 mg/dL              74-99      [facility]  
   
                                        Comment on above:   The American Diabete  
s Association (ADA) provides guidance for   
cutoff values for fasting glucose and random glucose. The ADA   
defines fasting as no caloric intake for at least 8 hours.   
Fasting plasma glucose results between 100 to 125 mg/dL   
indicate increased risk for diabetes (prediabetes).Fasting   
plasma glucose results greater than or equal to 126 mg/dL meet   
the criteria for diagnosis of diabetes. In the absence of   
unequivocal hyperglycemia, results should be confirmed by   
repeat testing. In a patient with classic symptoms of   
hyperglycemia or hyperglycemic crisis, random plasma glucose   
results greater than or equal to 200 mg/dL meet the criteria   
for diagnosis of diabetes.Reference: Standards of Medical Care   
in Diabetes 2016, American Diabetes Association. Diabetes Care.   
2016.39(Suppl 1).   
   
                                                            Order Comment: Speci  
men Type: BLOOD SPECIMEN  
Ordering Facility: [facility]  
Address: [address] [place], [place] [address]   
   
                                                            Result Comment: The   
American Diabetes Association (ADA)   
provides guidance for cutoff values for fasting glucose and   
random glucose. The ADA defines fasting as no caloric intake   
for at least 8 hours. Fasting plasma glucose results between   
100 to 125 mg/dL indicate increased risk for diabetes   
(prediabetes).  
Fasting plasma glucose results greater than or equal to 126   
mg/dL meet the criteria for diagnosis of diabetes. In the   
absence of unequivocal hyperglycemia, results should be   
confirmed by repeat testing. In a patient with classic symptoms   
of hyperglycemia or hyperglycemic crisis, random plasma glucose   
results greater than or equal to 200 mg/dL meet the criteria   
for diagnosis of diabetes.  
Reference: Standards of Medical Care in Diabetes 2016, American   
Diabetes Association. Diabetes Care. 2016.39(Suppl 1).   
   
                                                            Performed By: #### 1  
9123-9, 2532-0, 2777-1, 50960-6 ####  
[facility]  
CLIA [id number]  
[address] [place] [address]   
   
                      Potassium [Moles/Vol] 3.7 mmol/L            3.7-5.1    [facility]  
   
                                        Comment on above:   Order Comment: Speci  
men Type: BLOOD SPECIMEN  
Ordering Facility: [facility]  
Address: [address] [place] [address]   
   
                                                            Performed By: #### 1  
9123-9, 2532-0, 277-1, 43427-9 ####  
[facility]  
CLIA [id number]  
[address] [place] [address]   
   
                      Sodium [Moles/Vol] 139 mmol/L            136-144    [facility]  
   
                                        Comment on above:   Order Comment: Speci  
men Type: BLOOD SPECIMEN  
Ordering Facility: [facility]  
Address: [address] [place] [address]   
   
                                                            Performed By: #### 1  
9123-9, 2532-0, 2777-1, 40250-9 ####  
[facility]  
CLIA [id number]  
[address] [place] [address]   
   
                                                    Urea nitrogen   
[Mass/Vol]      6 mg/dL         Low             7-21            [facility]  
   
                                        Comment on above:   Order Comment: Speci  
men Type: BLOOD SPECIMEN  
Ordering Facility: [facility]  
Address: [address] [place] [address]   
   
                                                            Performed By: #### 1  
9123-9, 2532-0, 2777-1, 46491-6 ####  
[facility]  
CLIA [id number]  
[address] [place] [address]   
   
                                                    Comprehensive metabolic 2000  
 panelon 2024   
   
                                                    Creatinine and   
Glomerular filtration   
rate.predicted panel   
(S/P/Bld)       85 mL/min/1.73m??? Normal          >=60            [facility]  
   
                                        Comment on above:   Order Comment: Speci  
men Type: BLOOD SPECIMEN  
Ordering Facility: [facility]  
Address: [address] [place] [address]   
   
                                                            Result Comment: Teresa  
mated Glomerular Filtration Rate (eGFR) is   
calculated using the  CKD-EPI creatinine equation. This   
equation utilizes serum creatinine, sex, and age as parameters.   
The creatinine assay has traceable calibration to isotope   
dilution-mass spectrometry. Refer to KDIGO guidelines for   
clinical interpretation. In patients with unstable renal   
function, e.g. those with acute kidney injury, the eGFR may not   
accurately reflect actual GFR.   
   
                                                            Performed By: #### 1  
9123-9, 2532-0, 2777-1, 78285-0 ####  
[facility]  
CLIA [id number]  
[address] [place] [address]   
   
                                                    Erythrocyte distribution wid  
th [Ratio] by Automated counton 2024   
   
                                                    Erythrocyte   
distribution width   
(RBC) [Ratio]   14.6 %                          11.5-15.0       [facility]  
   
                                        Comment on above:   Order Comment: Speci  
men Type: BLOOD SPECIMEN  
Ordering Facility: [facility]  
Address: [address] [place] [address]   
   
                                                            Performed By: #### 5  
7021-8 ####  
[facility]  
CLIA [id number]  
[address] [place] [address]   
   
                                                    Erythrocytes [#/volume] in B  
lood by Automated counton 2024   
   
                      RBC (Bld) [#/Vol] 4.57 10*6/uL            3.90-5.20  [facility]  
   
                                        Comment on above:   Order Comment: Speci  
men Type: BLOOD SPECIMEN  
Ordering Facility: [facility]  
Address: [address] [place] [address]   
   
                                                            Performed By: #### 5  
7021-8 ####  
[facility]  
CLIA [id number]  
[address] [place] [address]   
   
                                                    Fact V Act/Nor PPPon -  
024   
   
                                                    Coagulation factor V   
activity actual/normal   
Coag (PPP) [Relative   
time]           72 %            Low                       [facility]  
   
                                        Comment on above:   Order Comment: Speci  
men Type: BLOOD SPECIMEN  
Ordering Facility: [facility]  
Address: [address] [place] [address]   
   
                                                            Performed By: #### 2  
276-4, 3, 86747-4 ####  
[facility]  
CLIA [id number]  
[address] [place] [address] [place]   
   
                                                    Fact VIII Act/Nor PPPon 06-0  
   
   
                                                    Coagulation factor   
VIII activity   
actual/normal Coag   
(PPP) [Relative time] 261 %           High                      [facility]  
   
                                        Comment on above:   Order Comment: Speci  
men Type: BLOOD SPECIMEN  
Ordering Facility: [facility]  
Address: [address] [place] [address]   
   
                                                            Result Comment: The   
factor VIII clottable activity level is   
elevated. This can be observed during the acute phase response.   
Persistent elevation of factor VIII, however, has been shown to   
be a risk factor for venous thrombosis. Suggest rechecking the   
factor VIII level in 1-2 months.   
   
                                                            Performed By: #### 2  
276-4, 3, 09503-2 ####  
[facility]  
CLIA [id number]  
[address] [place] [address] [place]   
   
                                                    Ferritin SerPl-mCncon 2024   
   
                      Ferritin [Mass/Vol] 124.0 ng/mL            14.7-205.1 [facility]  
   
                                        Comment on above:   Order Comment: Speci  
men Type: BLOOD SPECIMEN  
Ordering Facility: [facility]  
Address: [address] [place], [place] [address]   
   
                                                            Performed By: #### 2  
276-4, 2093-3, 48253-7 ####  
[facility]  
CLIA [id number]  
[address] [place], [place] [address] [place]   
   
                                                    Folate SerPl-mCncon 20  
24   
   
                      Folate [Mass/Vol] 5.8 ng/mL  Normal     >4.7       [facility]  
   
                                        Comment on above:   Order Comment: Speci  
men Type: BLOOD SPECIMEN  
Ordering Facility: [facility]  
Address: [address] [place] [address]   
   
                                                            Performed By: #### 2  
276-4, 2093-3, 20758-1 ####  
[facility]  
CLIA [id number]  
[address] [place] [address] [place]   
   
                                                    Hematocrit [Volume Fraction]  
 of Blood by Automated counton 2024   
   
                                                    Hematocrit (Bld)   
[Volume fraction] 38.8 %                          36.0-46.0       [facility]  
   
                                        Comment on above:   Order Comment: Speci  
men Type: BLOOD SPECIMEN  
Ordering Facility: [facility]  
Address: [address] [place] [address]   
   
                                                            Performed By: #### 5  
7021-8 ####  
[facility]  
CLIA [id number]  
[address] [place] [address]   
   
                                                    Hemoglobin [Mass/volume] in   
Bloodon 2024   
   
                                                    Hemoglobin (Bld)   
[Mass/Vol]      12.2 g/dL                       11.5-15.5       [facility]  
   
                                        Comment on above:   Order Comment: Speci  
men Type: BLOOD SPECIMEN  
Ordering Facility: [facility]  
Address: [address] [place], [place] [address]   
   
                                                            Performed By: #### 5  
7021-8 ####  
[facility]  
CLIA [id number]  
[address] [place] [address]   
   
                                                    Iron and Iron binding capaci  
ty panelon 2024   
   
                                                    Iron/TIBC [Molar   
ratio]          14.8 %          Low             15.0-57.0       [facility]  
   
                                        Comment on above:   Order Comment: Speci  
men Type: BLOOD SPECIMEN  
Ordering Facility: [facility]  
Address: [address] [place] [address]   
   
                                                            Performed By: #### 2  
276-4, 2093-3, 45092-8 ####  
[facility]  
CLIA [id number]  
[address] [place] [address] [place]   
   
                                                    Iron binding capacity [Mass/  
volume] in Serum or Plasmaon 2024   
   
                                                    Iron binding capacity   
[Mass/Vol]      283 ug/dL                       232-386         [facility]  
   
                                        Comment on above:   Order Comment: Speci  
men Type: BLOOD SPECIMEN  
Ordering Facility: [facility]  
Address: [address] [place] [address]   
   
                                                            Performed By: #### 2  
276-4, 2093-3, 97145-7 ####  
[facility]  
CLIA [id number]  
[address] [place] [address] [place]   
   
                                                    Iron saturation [Mass Fracti  
on] in Serum or Plasmaon 2024   
   
                                                    Iron saturation [Mass   
fraction]       14.8 %          Low             15.0-57.0       [facility]  
   
                                                    Iron+TIBC Pnl SerPlon 2024   
   
                      Iron [Mass/Vol] 42 ug/dL                   [facility]  
   
                                        Comment on above:   Order Comment: Speci  
men Type: BLOOD SPECIMEN  
Ordering Facility: [facility]  
Address: [address] [place] [address]   
   
                                                            Performed By: #### 2  
276-4, 2093-3, 34039-4 ####  
[facility]  
CLIA [id number]  
[address] [place] [address] [place]   
   
                                                    LDH SerPl-cCncon 2024   
   
                                                    LDH [Catalytic   
activity/Vol]   233 U/L         High            135-214         [facility]  
   
                                        Comment on above:   Hemolysis present. T  
he origin of the hemolysis, in vitro   
versus   
an in vivo hemolytic process, cannot be distinguished via this   
assay alone. In vitro hemolysis may lead to non-physiological   
(spurious) elevation in lactate dehydrogenase (LDH) results.   
The result should be interpreted in context of the clinical   
setting and other test results. Suggest reorder as clinically   
indicated.   
   
                                                            Order Comment: Speci  
men Type: BLOOD SPECIMEN  
Ordering Facility: [facility]  
Address: [address] [place] [address]   
   
                                                            Result Comment: Hemo  
lysis present. The origin of the hemolysis,   
in vitro versus an in vivo hemolytic process, cannot be   
distinguished via this assay alone. In vitro hemolysis may lead   
to non-physiological (spurious) elevation in lactate   
dehydrogenase (LDH) results. The  
result should be interpreted in context of the clinical setting   
and other test results. Suggest reorder as clinically   
indicated.   
   
                                                            Performed By: #### 1  
9123-9, 2532-0, 2777-1, 59725-4 ####  
[facility]  
CLIA [id number]  
[address] [place] [address]   
   
                                                    Leukocytes [#/volume] correc  
vinh for nucleated erythrocytes in Blood by Automated  
   
counon 2024   
   
                                                    WBC corrected for nucl   
RBC Auto (Bld) [#/Vol] 6.65 k/uL                       3.70-11.00      [facility]  
   
                                                    Lymphocytes [#/volume] in Bl  
ood by Automated counton 2024   
   
                                                    Lymphocytes (Bld)   
[#/Vol]         2.55 10*3/uL                    1.00-4.00       [facility]  
   
                                        Comment on above:   Order Comment: Speci  
men Type: BLOOD SPECIMEN  
Ordering Facility: [facility]  
Address: [address] [place] [address]   
   
                                                            Performed By: #### 5  
7021-8 ####  
[facility]  
CLIA [id number]  
[address] [place] [address]   
   
                                                    Lymphocytes/100 leukocytes i  
n Blood by Automated counton 2024   
   
                                                    Lymphocytes/100 WBC   
(Bld)           38.3 %                                          [facility]  
   
                                        Comment on above:   Order Comment: Speci  
men Type: BLOOD SPECIMEN  
Ordering Facility: [facility]  
Address: [address] [place] [address]   
   
                                                            Performed By: #### 5  
7021-8 ####  
[facility]  
CLIA [id number]  
[address] [place] [address] [id number]   
   
                                                    MCH [Entitic mass] by Automa  
vinh counton 2024   
   
                                                    MCH (RBC) [Entitic   
mass]           26.7 pg                         26.0-34.0       [facility]  
   
                                        Comment on above:   Order Comment: Speci  
men Type: BLOOD SPECIMEN  
Ordering Facility: [facility]  
Address: [address] [place], [place] [address]   
   
                                                            Performed By: #### 5  
7021-8 ####  
[facility]  
CLIA [id number]  
[address] [place] [address]   
   
                                                    MCHC [Mass/volume] by Automa  
vinh counton 2024   
   
                      MCHC (RBC) [Mass/Vol] 31.4 g/dL             30.5-36.0  [facility]  
   
                                        Comment on above:   Order Comment: Speci  
men Type: BLOOD SPECIMEN  
Ordering Facility: [facility]  
Address: [address] [place], [place] [address]   
   
                                                            Performed By: #### 5  
7021-8 ####  
[facility]  
CLIA [id number]  
[address] [place] [address]   
   
                                                    MCV [Entitic volume] by Auto  
mated counton 2024   
   
                                                    MCV (RBC) [Entitic   
vol]            84.9 fL                         80.0-100.0      [facility]  
   
                                        Comment on above:   Order Comment: Speci  
men Type: BLOOD SPECIMEN  
Ordering Facility: [facility]  
Address: [address] [place], [place] [address]   
   
                                                            Performed By: #### 5  
7021-8 ####  
[facility]  
CLIA [id number]  
[address] [place] [address]   
   
                                                    Magnesium SerPl-mCncon    
   
                      Magnesium [Mass/Vol] 2.0 mg/dL             1.7-2.3    [facility]  
   
                                        Comment on above:   Order Comment: Speci  
men Type: BLOOD SPECIMEN  
Ordering Facility: [facility]  
Address: [address] [place] [address]   
   
                                                            Performed By: #### 1  
9123-9, 2532-0, 2777-1, 53350-9 ####  
[facility]  
CLIA [id number]  
[address] [place] [address]   
   
                                                    Neutrophils [#/volume] in Bl  
ood by Automated counton 2024   
   
                                                    Neutrophils (Bld)   
[#/Vol]         3.43 10*3/uL                    1.45-7.50       [facility]  
   
                                        Comment on above:   Order Comment: Speci  
men Type: BLOOD SPECIMEN  
Ordering Facility: [facility]  
Address: [address] [facility] [place] [address]   
   
                                                            Performed By: #### 5  
7021-8 ####  
[facility]  
CLIA [id number]  
[address] [place] [address]   
   
                                                    No Panel Informationon    
   
                                                    Coagulation Factor V   
Activity        72 %            Low                       [facility]  
   
                                                    Coagulation Factor   
VIII Activity   261 %           High                      [facility]  
   
                                        Comment on above:   The factor VIII clot  
table activity level is elevated. This can  
   
be observed during the acute phase response. Persistent   
elevation of factor VIII, however, has been shown to be a risk   
factor for venous thrombosis. Suggest rechecking the factor   
VIII level in 1-2 months.   
   
                                                    Estimated GFR   
(CKD-EPI)       85 mL/min/1.73m???                 >=60            [facility]  
   
                                        Comment on above:   Estimated Glomerular  
 Filtration Rate (eGFR) is calculated   
using   
the  CKD-EPI creatinine equation. This equation utilizes   
serum creatinine, sex, and age as parameters. The creatinine   
assay has traceable calibration to isotope dilution-mass   
spectrometry. Refer to KDIGO guidelines for clinical   
interpretation. In patients with unstable renal function, e.g.   
those with acute kidney injury, the eGFR may not accurately   
reflect actual GFR.   
   
                      Folate     5.8 ng/mL             >4.7       [facility]  
   
                                                    Immature Granulocyte #   
(Auto)          <0.03 k/uL                      <0.10           [facility]  
   
                      Phosphorus Level 3.9 mg/dL             2.7-4.8    [facility]  
   
                                                    Nucleated erythrocytes [#/vo  
lume] in Blood by Automated counton 2024   
   
                                                    Nucleated RBC (Bld)   
[#/Vol]         10*3/uL                         <0.01           [facility]  
   
                                        Comment on above:   Order Comment: Speci  
men Type: BLOOD SPECIMEN  
Ordering Facility: [facility]  
Address: [address] [place] [address]   
   
                                                            Performed By: #### 5  
7021-8 ####  
[facility]  
CLIA [id number]  
[address] [place] [address]   
   
                                                    Nucleated erythrocytes [Pres  
ence] in Blood by Automated counton 2024   
   
                                                    Nucleated RBC Auto Ql   
(Bld)           0.0 /100{WBC}                                   [facility]  
   
                                                    Phosphate SerPl-mCncon    
   
                      Phosphate [Mass/Vol] 3.9 mg/dL  Normal     2.7-4.8    [facility]  
   
                                        Comment on above:   Order Comment: Speci  
men Type: BLOOD SPECIMEN  
Ordering Facility: [facility]  
Address: [address] [place], [place] [address]   
   
                                                            Performed By: #### 1  
9123-9, 2532-0, 2777-1, 60207-0 ####  
[facility]  
CLIA [id number]  
[address] [place] [address]   
   
                                                    Platelet mean volume [Entiti  
c volume] in Blood by Automated counton 2024   
   
                                                    Platelet mean volume   
(Bld) [Entitic vol] 10.2 fL                         9.0-12.7        [facility]  
   
                                        Comment on above:   Order Comment: Speci  
men Type: BLOOD SPECIMEN  
Ordering Facility: [facility]  
Address: [address] [place] [address]   
   
                                                            Performed By: #### 5  
7021-8 ####  
[facility]  
CLIA [id number]  
[address] [place] [address]   
   
                                                    Platelets [#/volume] in Bloo  
d by Automated counton 2024   
   
                                                    Platelets (Bld)   
[#/Vol]         355 10*3/uL                     150-400         [facility]  
   
                                        Comment on above:   Order Comment: Speci  
men Type: BLOOD SPECIMEN  
Ordering Facility: [facility]  
Address: [address] [place] [address]   
   
                                                            Performed By: #### 5  
7021-8 ####  
[facility]  
CLIA [id number]  
[address] [place] [address]   
   
                                                    Prealb SerPl-mCncon 20  
24   
   
                      Prealbumin [Mass/Vol] 21 mg/dL              17-36      [facility]  
   
                                        Comment on above:   Order Comment: Speci  
men Type: BLOOD SPECIMEN  
Ordering Facility: [facility]  
Address: [address] [place] [address]   
   
                                                            Performed By: #### 2  
276-4, -3, 66282-9 ####  
[facility]  
CLIA [id number]  
[address] [place] [address] [place]   
   
                                                    Protein [Mass/volume] in Ser  
um or Plasmaon 2024   
   
                      Protein [Mass/Vol] 8.0 g/dL              6.3-8.0    [facility]  
   
                                        Comment on above:   Order Comment: Speci  
men Type: BLOOD SPECIMEN  
Ordering Facility: [facility]  
Address: [address] [place] [address]   
   
                                                            Performed By: #### 1  
9123-9, 2532-0, 2777-1, 30147-3 ####  
[facility]  
CLIA [id number]  
[address] [place] [address]   
   
                                                    Prothrombin activity actual/  
normal ratio in platelet poor plasma by coagulation   
assayon 2024   
   
                                                    Prothrombin activity   
actual/normal Coag   
(PPP) [Relative time] 95 %                                      [facility]  
   
                                        Comment on above:   Order Comment: Speci  
men Type: BLOOD SPECIMEN  
Ordering Facility: [facility]  
Address: [address] [place] [id number]   
   
                                                            Performed By: #### 2  
276-4, -3, 12960-6 ####  
[facility]  
CLIA [id number]  
[address] [place] [address] [place]   
   
                                                    Serum or plasma anion gap de  
terminationon 2024   
   
                      Anion gap [Moles/Vol] 12 mmol/L             8-15       [facility]  
   
                                        Comment on above:   Order Comment: Speci  
men Type: BLOOD SPECIMEN  
Ordering Facility: [facility]  
Address: [address] [place] [address]   
   
                                                            Performed By: #### 1  
9123-9, 2532-0, 2777-1, 02612-4 ####  
[facility]  
CLIA [id number]  
[address] [place] [address]   
   
                                                    Vit B12 SerPl-mCncon   
024   
   
                                                    Cobalamin (Vitamin   
B12) [Mass/Vol] 283 pg/mL                       232-1245        [facility]  
   
                                        Comment on above:   Order Comment: Speci  
men Type: BLOOD SPECIMEN  
Ordering Facility: [facility]  
Address: [address] [place] [address]   
   
                                                            Performed By: #### 2  
276-4, 2093-3, 55546-8 ####  
[facility]  
CLIA [id number]  
[address] [place] [address]  
[id number] [place] [facility]   
   
                                                    Alanine aminotransferase [En  
zymatic activity/volume] in Serum or PlasmaOrdered   
By:   
Cesario Piedra on 2024   
   
                                                    ALT [Catalytic   
activity/Vol]   8 U/L           Normal          7-52            [facility]  
   
                                        Comment on above:   Performed By: #### T  
4F, TSH3 wRFLX, LIPID, MG, CBC, CMP ####  
[facility]  
[address] [id number] [facility]   
   
                                                    Albumin [Mass/volume] in Ser  
um or Plasma by Bromocresol green (BCG) dye binding   
methoOrdered By: Cesario Piedra on 2024   
   
                                                    Albumin BCG dye   
[Mass/Vol]      3.3 g/dL        Low             3.5-5.7         [facility]  
   
                                                    Alkaline phosphatase [Enzyma  
tic activity/volume] in Serum or PlasmaOrdered By:   
Cesario Piedra on 2024   
   
                                                    ALP [Catalytic   
activity/Vol]   95 U/L          Normal                    [facility]  
   
                                        Comment on above:   Performed By: #### T  
4F, TSH3 wRFLX, LIPID, MG, CBC, CMP ####  
[facility]  
[address] [id number] [place]   
   
                                                    Aspartate aminotransferase [  
Enzymatic activity/volume] in Serum or PlasmaOrdered  
By:   
Cesario Piedra on 2024   
   
                                                    AST [Catalytic   
activity/Vol]   12 U/L          Low             13-39           [facility]  
   
                                        Comment on above:   Performed By: #### T  
4F, TSH3 wRFLX, LIPID, MG, CBC, CMP ####  
[facility]  
[address] [place] [address] [place]   
   
                                                    Automated basophil %Ordered   
By: Cesario Piedra on 2024   
   
                                                    Basophils/100 WBC   
(Bld)           0.8 %           Normal          .               [facility]  
   
                                        Comment on above:   Performed By: #### T  
4F, TSH3 wRFLX, LIPID, MG, CBC, CMP ####  
[address]   
   
                                                    Automated basophil countOrde  
red By: Cesario Piedra on 2024   
   
                                                    Basophils (Bld)   
[#/Vol]         0.0 10*3/uL     Normal          0.0-0.2         [facility]  
   
                                        Comment on above:   Result Comment: PERF  
ORMED BY:  
[place] [address]  
[phone]  
PATHOLOGIST MEDICAL DIRECTOR  
KAYLAH GEORGE M.D.   
   
                                                            Performed By: #### T  
4F, TSH3 wRFLX, LIPID, MG, CBC, CMP ####  
[address]   
   
                                                    Automated blood monocyte cou  
ntOrdered By: Cesario Piedra on 2024   
   
                                                    Monocytes (Bld)   
[#/Vol]         0.5 10*3/uL     Normal          0.0-0.8         [facility]  
   
                                        Comment on above:   Performed By: #### T  
4F, TSH3 wRFLX, LIPID, MG, CBC, CMP ####  
[address]   
   
                                                    Automated eosinophil %Ordere  
d By: Cesario Piedra on 2024   
   
                                                    Eosinophils/100 WBC   
(Bld)           3.2 %           Normal          .               [facility]  
   
                                        Comment on above:   Performed By: #### T  
4F, TSH3 wRFLX, LIPID, MG, CBC, CMP ####  
[address]   
   
                                                    Automated eosinophil countOr  
dered By: Cesario Piedra on 2024   
   
                                                    Eosinophils (Bld)   
[#/Vol]         0.2 10*3/uL     Normal          0.0-0.45        [facility]  
   
                                        Comment on above:   Performed By: #### T  
4F, TSH3 wRFLX, LIPID, MG, CBC, CMP ####  
[address]   
   
                                                    Automated monocyte %Ordered   
By: Cesario Piedra on 2024   
   
                                                    Monocytes/100 WBC   
(Bld)           9.1 %           Normal          .               [facility]  
   
                                        Comment on above:   Performed By: #### T  
4F, TSH3 wRFLX, LIPID, MG, CBC, CMP ####  
[facility]  
[address]   
   
                                                    Automated neutrophil %Ordere  
d By: Cesariosendy Piedra on 2024   
   
                                                    Neutrophils/100 WBC   
(Bld)           54.9 %          Normal          .               [facility]  
   
                                        Comment on above:   Performed By: #### T  
4F, TSH3 wRFLX, LIPID, MG, CBC, CMP ####  
[facility]  
[address]   
   
                                                    Bilirubin.total [Mass/volume  
] in Serum or PlasmaOrdered By: Cesario Piedra on   
2024   
   
                      Bilirubin [Mass/Vol] 0.5 mg/dL  Normal     0.3-1.0    [facility]  
   
                                        Comment on above:   Performed By: #### T  
4F, TSH3 wRFLX, LIPID, MG, CBC, CMP ####  
[facility]  
[address] [place] [address]   
   
                                                    Calcium [Mass/volume] in Ser  
um or PlasmaOrdered By: Cesario Piedra on 2024  
   
   
                      Calcium [Mass/Vol] 9.3 mg/dL  Normal     8.6-10.3   [facility]  
   
                                        Comment on above:   Performed By: #### T  
4F, TSH3 wRFLX, LIPID, MG, CBC, CMP ####  
[facility]  
[address] [place] [address]   
   
                                                    Carbon dioxide, total [Moles  
/volume] in Serum or PlasmaOrdered By: Cesario Piedra on   
2024   
   
                      CO2 [Moles/Vol] 29.4 mmol/L Normal     21.0-31.0  [facility]  
   
                                        Comment on above:   Performed By: #### T  
4F, TSH3 wRFLX, LIPID, MG, CBC, CMP ####  
[facility]  
[address] [place] [address] [place]   
   
                                                    Chloride [Moles/volume] in S  
stevenson or PlasmaOrdered By: Cesario Piedra on   
2024   
   
                      Chloride [Moles/Vol] 100 mmol/L Normal          [facility]  
   
                                        Comment on above:   Performed By: #### T  
4F, TSH3 wRFLX, LIPID, MG, CBC, CMP ####  
[facility]  
[address]   
   
                                                    Cholesterol [Mass/volume] in  
 Serum or PlasmaOrdered By: Cesario Piedra on   
2024   
   
                      Cholesterol [Mass/Vol] 121 mg/dL  Low        140-200    [facility]  
   
                                        Comment on above:   Chol less than 200 m  
g/dl low riskChol 201-239 mg/dl borderline  
   
riskChol 240 mg/dl and greater high risk   
   
                                                            Result Comment: Chol  
 less than 200 mg/dl low risk  
Chol 201-239 mg/dl borderline risk  
Chol 240 mg/dl and greater high risk   
   
                                                            Performed By: #### T  
4F, TSH3 wRFLX, LIPID, MG, CBC, CMP ####  
[facility]  
[address] [id number] [facility]   
   
                                                    Cholesterol in LDL Calc [Mas  
s/Vol]Ordered By: Cesario Piedra on 2024   
   
                                                    Cholesterol in LDL   
[Mass/Vol]      67 mg/dL                        0-100           [facility]  
   
                                        Comment on above:   LDL ATP III CLASSIFI  
CATIONLDL less than 100 mg/dL OptimalLDL   
100-129 mg/dL Near or above optimalLDL 130-159 mg/dL Borderline   
highLDL 160-189 mg/dL HighLDL greater than 189 mg/dL Very high   
   
                                                    Cholesterol in VLDL Calc [Ma  
ss/Vol]Ordered By: Cesario Piedra on 2024   
   
                                                    Cholesterol in VLDL   
[Mass/Vol]      17 mg/dL                                        [facility]  
   
                                                    Complete Blood Count Auto Di  
ffon 2024   
   
                                                    Mean Corpuscular HGB   
Conc            32.7 g/dL       Normal          32.0-35.0       [facility]   
Physician   
Group  
   
                                        Comment on above:   Performed By: #### T  
4F, TSH3 wRFLX, LIPID, MG, CBC, CMP ####  
[facility]  
[address]   
   
                      NRBC%      0.0 /100{WBC} Normal     0-0.5      [facility]   
Physician   
Group  
   
                                        Comment on above:   Performed By: #### T  
4F, TSH3 wRFLX, LIPID, MG, CBC, CMP ####  
[facility]  
[address] [id number] [facility]   
   
                                                    Comprehensive Metabolic Pane  
max 2024   
   
                      Albumin [Mass/Vol] 3.3 g/dL   Low        3.5-5.7    [facility]   
Physician   
Group  
   
                                        Comment on above:   Performed By: #### T  
4F, TSH3 wRFLX, LIPID, MG, CBC, CMP ####  
[address]   
   
                                                    GFR/1.73 sq   
M.predicted MDRD   
(S/P/Bld) [Vol   
rate/Area]      mL/min/{1.73_m2} Normal                          [facility]   
Physician   
Group  
   
                                        Comment on above:   Performed By: #### T  
4F, TSH3 wRFLX, LIPID, MG, CBC, CMP ####  
[address]   
   
                                                    Creatinine [Mass/volume] in   
Serum or PlasmaOrdered By: Cesario Piedra on   
2024   
   
                      Creatinine [Mass/Vol] 0.63 mg/dL Normal     0.60-1.20  [facility]  
   
                                        Comment on above:   Performed By: #### T  
4F, TSH3 wRFLX, LIPID, MG, CBC, CMP ####  
[address]   
   
                                                    Erythrocyte distribution wid  
th [Ratio] by Automated countOrdered By: Cesario Piedra   
on 2024   
   
                                                    Erythrocyte   
distribution width   
(RBC) [Ratio]   14.7 %          Normal          11.9-15.3       [facility]  
   
                                        Comment on above:   Performed By: #### T  
4F, TSH3 wRFLX, LIPID, MG, CBC, CMP ####  
[place] [address] [place]   
   
                                                    Erythrocytes [#/volume] in B  
lood by Automated countOrdered By: Cesario Piedra on  
   
2024   
   
                      RBC (Bld) [#/Vol] 4.29 10*6/uL Normal     3.60-5.00  [facility]  
   
                                        Comment on above:   Performed By: #### T  
4F, TSH3 wRFLX, LIPID, MG, CBC, CMP ####  
[place] [address] [place]   
   
                                                    Glucose [Mass/volume] in Ser  
um or PlasmaOrdered By: Cesario Piedra on 2024  
   
   
                      Glucose [Mass/Vol] 85 mg/dL   Normal          [facility]  
   
                                        Comment on above:   ADA recommended refe  
rence rangeRandom Glucose Reference Range   
is dependent on time and content of last meal. Glucose of more   
than 200 mg/dL in a nonstressed, ambulatory subject supports   
the diagnosis of Diabetes Mellitus.   
   
                                                            Result Comment: Rand  
om Glucose Reference Range is dependent on   
time and  
content of last meal. Glucose of more than 200 mg/dL in a  
nonstressed, ambulatory subject supports the diagnosis of  
Diabetes Mellitus.  
ADA recommended reference range   
   
                                                            Performed By: #### T  
4F, TSH3 wRFLX, LIPID, MG, CBC, CMP ####  
[facility]  
[address] [place] [address]   
   
                                                    Hematocrit [Volume Fraction]  
 of Blood by Automated countOrdered By: Cesario Piedra on   
2024   
   
                                                    Hematocrit (Bld)   
[Volume fraction] 35.7 %          Normal          34.0-46.4       [facility]  
   
                                        Comment on above:   Performed By: #### T  
4F, TSH3 wRFLX, LIPID, MG, CBC, CMP ####  
[address]   
   
                                                    Hemoglobin [Mass/volume] in   
BloodOrdered By: Cesario Piedra on 2024   
   
                                                    Hemoglobin (Bld)   
[Mass/Vol]      11.7 g/dL       Low             11.8-15.4       [facility]  
   
                                        Comment on above:   Performed By: #### T  
4F, TSH3 wRFLX, LIPID, MG, CBC, CMP ####  
[facility]  
[address] [place] [address]   
   
                                                    Leukocytes [#/volume] correc  
vinh for nucleated erythrocytes in Blood by Automated  
   
counOrdered By: Cesario Piedra on 2024   
   
                                                    WBC corrected for nucl   
RBC Auto (Bld) [#/Vol] 5.9 10*3/uL                     3.8-11.6        [facility]  
   
                                                    Leukocytes [#/volume] in Blo  
od by Automated countOrdered By: Cesario Piedra on   
2024   
   
                      WBC (Bld) [#/Vol] 5.9 10*3/uL Normal     3.8-11.6   [facility]  
   
                                        Comment on above:   Performed By: #### T  
4F, TSH3 wRFLX, LIPID, MG, CBC, CMP ####  
[facility]  
[address]   
   
                                                    Lipid Panelon 2024   
   
                                                    LDL   
Cholesterol,Calculated 67 mg/dL        Normal          0-100           The [facility] include 234 beds at the [facility]   
Physician   
Group  
   
                                        Comment on above:   Result Comment: LDL   
ATP III CLASSIFICATION  
LDL less than 100 mg/dL Optimal  
-129 mg/dL Near or above optimal  
-159 mg/dL Borderline high  
-189 mg/dL High  
LDL greater than 189 mg/dL Very high   
   
                                                            Performed By: #### T  
4F, TSH3 wRFLX, LIPID, MG, CBC, CMP ####  
[facility]  
[address]   
   
                      Triglyceride w/Reflex 88 mg/dL   Normal     0-149      The  
 Replaced by [facility]   
Physician   
Group  
   
                                        Comment on above:   Result Comment: TRIG  
 ATP III CLASSIFICATION  
TRIG less than 150 mg/dL Normal  
TRIG 150-199 mg/dL Borderline high  
TRIG 200-500 mg/dL High  
TRIG greater than 500 mg/dL Very high  
Standard traceable to the Center for Disease Conrtrol and  
Prevention (CDC) test method.   
   
                                                            Performed By: #### T  
4F, TSH3 wRFLX, LIPID, MG, CBC, CMP ####  
[facility]  
[address]   
   
                      VLDL CHOLESTEROL 17 mg/dL   Normal                [facility]   
Physician   
Group  
   
                                        Comment on above:   Performed By: #### T  
4F, TSH3 wRFLX, LIPID, MG, CBC, CMP ####  
[facility]  
[address] [place] [address] [place]   
   
                                                    Lymphocytes [#/volume] in Bl  
ood by Automated countOrdered By: Cesario Piedra on   
2024   
   
                                                    Lymphocytes (Bld)   
[#/Vol]         1.9 10*3/uL     Normal          1.00-4.8        [facility]  
   
                                        Comment on above:   Performed By: #### T  
4F, TSH3 wRFLX, LIPID, MG, CBC, CMP ####  
[facility]  
[address] [place] [address] [place]   
   
                                                    Lymphocytes/100 leukocytes i  
n Blood by Automated countOrdered By: Cesario Piedra  
 on   
2024   
   
                                                    Lymphocytes/100 WBC   
(Bld)           32.0 %          Normal          .               [facility]  
   
                                        Comment on above:   Performed By: #### T  
4F, TSH3 wRFLX, LIPID, MG, CBC, CMP ####  
[address]   
   
                                                    MCH [Entitic mass] by Automa  
vinh countOrdered By: Cesario Piedra on 2024   
   
                                                    MCH (RBC) [Entitic   
mass]           27.2 pg         Normal          24.7-34.3       [facility]  
   
                                        Comment on above:   Performed By: #### T  
4F, TSH3 wRFLX, LIPID, MG, CBC, CMP ####  
[address]   
   
                                                    MCHC Auto (RBC) [Mass/Vol]Or  
dered By: Cesario Piedra on 2024   
   
                      MCHC (RBC) [Mass/Vol] 32.7 g/dL             32.0-35.0  [facility]  
   
                                                    MCV [Entitic volume] by Auto  
mated countOrdered By: Cesario Piedra on 2024   
   
                                                    MCV (RBC) [Entitic   
vol]            83.3 fL         Normal                    [facility]  
   
                                        Comment on above:   Performed By: #### T  
4F, TSH3 wRFLX, LIPID, MG, CBC, CMP ####  
[address]   
   
                                                    Magnesium [Mass/volume] in S  
stevenson or PlasmaOrdered By: Cesario Piedra on   
2024   
   
                      Magnesium [Mass/Vol] 1.8 mg/dL  Low        1.9-2.7    [facility]  
   
                                        Comment on above:   Performed By: #### T  
4F, TSH3 wRFLX, LIPID, MG, CBC, CMP ####  
[facility]  
[address] [place] [address]   
   
                                                    Neutrophils [#/volume] in Bl  
ood by Automated countOrdered By: Cesario Piedra on   
2024   
   
                                                    Neutrophils (Bld)   
[#/Vol]         3.3 10*3/uL     Normal          1.8-7.7         [facility]  
   
                                        Comment on above:   Performed By: #### T  
4F, TSH3 wRFLX, LIPID, MG, CBC, CMP ####  
[facility] [address]   
   
                                                    No Panel InformationOrdered   
By: Cesario Dorothea on 2024   
   
                                                    Estimated GFR   
(CKD-EPI)       > 60.0 mL/Min                                   [facility]  
   
                                                    Pharmacy Creatinine   
Clearance (Chem N/A                                             [facility]  
   
                                                    Nucleated erythrocytes [Pres  
ence] in Blood by Automated countOrdered By: Cesario  
   
Dorothea on 2024   
   
                                                    Nucleated RBC Auto Ql   
(Bld)           0.0 /100{WBC}                   0-0.5           [facility]  
   
                                                    Platelet mean volume [Entiti  
c volume] in Blood by Automated countOrdered By:   
Cesario Piedra on 2024   
   
                                                    Platelet mean volume   
(Bld) [Entitic vol] 8.4 fL          Normal          6.3-10.7        [facility]  
   
                                        Comment on above:   Performed By: #### T  
4F, TSH3 wRFLX, LIPID, MG, CBC, CMP ####  
[address]   
   
                                                    Platelets [#/volume] in Bloo  
d by Automated countOrdered By: Cesario Piedra on   
2024   
   
                                                    Platelets (Bld)   
[#/Vol]         364 10*3/uL     Normal          150-450         [facility]  
   
                                        Comment on above:   Performed By: #### T  
4F, TSH3 wRFLX, LIPID, MG, CBC, CMP ####  
[place] [address] [place]   
   
                                                    Potassium [Moles/volume] in   
Serum or PlasmaOrdered By: Cesario Piedra on   
2024   
   
                      Potassium [Moles/Vol] 3.9 mmol/L Normal     3.5-5.1    [facility]  
   
                                        Comment on above:   Performed By: #### T  
4F, TSH3 wRFLX, LIPID, MG, CBC, CMP ####  
[facility]  
[address] [place] [address] [place]   
   
                                                    Protein [Mass/volume] in Ser  
um or PlasmaOrdered By: Cesario Piedra on 2024  
   
   
                      Protein [Mass/Vol] 6.8 g/dL   Normal     6.4-8.9    [facility]  
   
                                        Comment on above:   Performed By: #### T  
4F, TSH3 wRFLX, LIPID, MG, CBC, CMP ####  
[facility]  
[address]   
   
                                                    Serum globulin measurement b  
y calculation (mass/volume)Ordered By: Cesario Piedra on   
2024   
   
                                                    Globulin (S)   
[Mass/Vol]      3.5 g/dL        [facility]  
   
                                        Comment on above:   Performed By: #### T  
4F, TSH3 wRFLX, LIPID, MG, CBC, CMP ####  
[facility]  
[address] [place] [address]   
   
                                                    Serum or plasma albumin/glob  
ulin mass ratioOrdered By: Cesario Piedra on   
2024   
   
                                                    Albumin/Globulin [Mass   
ratio]          0.9 {ratio}     [facility]  
   
                                        Comment on above:   Performed By: #### T  
4F, TSH3 wRFLX, LIPID, MG, CBC, CMP ####  
[address]   
   
                                                    Serum or plasma anion gap de  
terminationOrdered By: Cesario Piedra on 2024   
   
                      Anion gap [Moles/Vol] 11.5 mmol/L Normal     6.0-15.0   [facility]  
   
                                        Comment on above:   Performed By: #### T  
4F, TSH3 wRFLX, LIPID, MG, CBC, CMP ####  
[facility]  
[address] [place] [address]   
   
                                                    Serum or plasma high density  
 lipoprotein (HDL) cholesterol measurementOrdered   
By:   
Cesario Piedra on 2024   
   
                                                    Cholesterol in HDL   
[Mass/Vol]      36 mg/dL        Normal          23-92           [facility]  
   
                                        Comment on above:   HDL CHOL ATP-III CLA  
SSIFICATION Cardiovascular RiskHDL > or   
equal to 60 mg/dL LOWHDL < 40 mg/dL HIGH   
   
                                                            Result Comment: HDL   
CHOL ATP-III CLASSIFICATION  
Cardiovascular  
Risk  
HDL > or equal to 60 mg/dL LOW  
HDL < 40 mg/dL HIGH   
   
                                                            Performed By: #### T  
4F, TSH3 wRFLX, LIPID, MG, CBC, CMP ####  
[facility]  
[address] [place] [address]   
   
                                                    Serum or plasma total choles  
terol/high density lipoprotein (HDL) cholesterol   
mass   
ratOrdered By: Cesario Piedra on 2024   
   
                                                    Cholesterol.total/Chol  
esterol in HDL [Mass   
ratio]          3.4 {ratio}     Normal          <5.0            [facility]  
   
                                        Comment on above:   Performed By: #### T  
4F, TSH3 wRFLX, LIPID, MG, CBC, CMP ####  
[address]   
   
                                                    Sodium [Moles/volume] in Ser  
um or PlasmaOrdered By: Cesario Piedra on 2024  
   
   
                      Sodium [Moles/Vol] 137 mmol/L Normal     136-145    [facility]  
   
                                        Comment on above:   Performed By: #### T  
4F, TSH3 wRFLX, LIPID, MG, CBC, CMP ####  
[address]   
   
                                                    Thyroid Stim Hormone w/Rflxo  
n 2024   
   
                                                    Thyroid Stim Hormone   
w/Rflx          0.28 u[iU]/mL   Low             0.45-5.33       The Replaced by [facility]   
Physician   
Group  
   
                                        Comment on above:   Result Comment: PERF  
ORMED BY:  
[place] [address]  
[phone]  
PATHOLOGIST MEDICAL DIRECTOR  
KAYLAH GEORGE M.D.   
   
                                                            Performed By: #### T  
4F, TSH3 wRFLX, LIPID, MG, CBC, CMP ####  
[address]   
   
                                                    Thyrotropin [Units/volume] i  
n Serum or PlasmaOrdered By: Cesario Piedra on   
2024   
   
                      TSH Qn     0.28 m[IU]/L Low        0.45-5.33  [facility]  
   
                                                    Thyroxine (T4) free [Mass/vo  
lume] in Serum or PlasmaOrdered By: Cesario Piedra   
on   
2024   
   
                      Free T4 [Mass/Vol] 1.22 ng/dL High       0.61-1.12  [facility]  
   
                                        Comment on above:   Performed By: #### T  
4F, TSH3 wRFLX, LIPID, MG, CBC, CMP ####  
[address]   
   
                                                    Triglyceride [Mass/volume] i  
n Serum or PlasmaOrdered By: Cesario Piedra on   
2024   
   
                                                    Triglyceride   
[Mass/Vol]      88 mg/dL                        0-149           [facility]  
   
                                        Comment on above:   TRIG ATP III CLASSIF  
ICATIONTRIG less than 150 mg/dL NormalTRIG  
   
150-199 mg/dL Borderline highTRIG 200-500 mg/dL High TRIG   
greater than 500 mg/dL Very highStandard traceable to the   
Center for Disease Conrtrol and Prevention (CDC) test method.   
   
                                                    Urea nitrogen [Mass/volume]   
in Serum or PlasmaOrdered By: Cesario Piedra on   
2024   
   
                                                    Urea nitrogen   
[Mass/Vol]      10 mg/dL        Normal          7-25            [facility]  
   
                                        Comment on above:   Performed By: #### T  
4F, TSH3 wRFLX, LIPID, MG, CBC, CMP ####  
[facility]  
[address]   
   
                                                    Basophils Auto (Bld) [#/Vol]  
on 2024   
   
                                                    Basophils (Bld)   
[#/Vol]         0.0 10 3/uL                     0.0-0.1         [facility]  
   
                                                    Basophils/100 WBC Auto (Bld)  
on 2024   
   
                                                    Basophils/100 WBC   
(Bld)           0.3 %                           0.2-2.0         [facility]  
   
                                                    Eosinophils/100 WBC Auto (Bl  
d)on 2024   
   
                                                    Eosinophils/100 WBC   
(Bld)           1.5 %                           0.9-7.0         [facility]  
   
                                                    Erythrocyte distribution wid  
th Auto (RBC) [Ratio]on 2024   
   
                                                    Erythrocyte   
distribution width   
(RBC) [Ratio]   13.8 %                          11.0-15.0       [facility]  
   
                                                    Estimated glomerular filtrat  
ion rate (GFR) non- Americanon 2024   
   
                                                    GFR/1.73 sq   
M.predicted among   
non-blacks MDRD   
(S/P/Bld) [Vol   
rate/Area]      mL/min/{1.73_m2}                 >=60            [facility]  
   
                                                    Globulin Calc (S) [Mass/Vol]  
on 2024   
   
                                                    Globulin (S)   
[Mass/Vol]      4.7 g/dL                                        [facility]  
   
                                                    Hematocrit Auto (Bld) [Volum  
e fraction]on 2024   
   
                                                    Hematocrit (Bld)   
[Volume fraction] 41.2 %                          36.0-48.0       [facility]  
   
                                                    Hemoglobin [Mass/volume] in   
Bloodon 2024   
   
                                                    Hemoglobin (Bld)   
[Mass/Vol]      13.2 g/dL                       12.0-16.0       [facility]  
   
                                                    Laboratory - Chemistry and C  
hemistry - challengeon 2024   
   
                      Albumin [Mass/Vol] 3.0 g/dL              3.4-5.0    [facility]  
   
                                                    ALP [Catalytic   
activity/Vol]   97 U/L                                    [facility]  
   
                                                    ALT [Catalytic   
activity/Vol]   16 U/L                          14-59           [facility]  
   
                                                    AST [Catalytic   
activity/Vol]   83 U/L                          15-37           [facility]  
   
                      Bilirubin [Mass/Vol] 0.4 mg/dL             0.2-1.0    [facility]  
   
                      Calcium [Mass/Vol] 8.7 mg/dL             8.5-10.1   [facility]  
   
                      Chloride [Moles/Vol] 99 mmol/L                  [facility]  
   
                      CO2 [Moles/Vol] 34.0 mmol/L            21.0-32.0  [facility]  
   
                      Creatinine [Mass/Vol] 0.83 mg/dL            0.55-1.02  [facility]  
   
                                                    GFR/1.73 sq   
M.predicted MDRD   
(S/P/Bld) [Vol   
rate/Area]      mL/min/{1.73_m2}                 >=60            [facility]  
   
                      Glucose [Mass/Vol] 88 mg/dL                   [facility]  
   
                      Potassium [Moles/Vol] 2.9 mmol/L            3.5-5.1    [facility]  
   
                                        Comment on above:   RESULTS CALLED TO [facility]   
   
                      Protein [Mass/Vol] 7.7 g/dL              6.4-8.2    [facility]  
   
                      Sodium [Moles/Vol] 140 mmol/L            136-145    [facility]  
   
                          TSH Qn       0.571 m[IU]/L              0.358-3.74  
0                                       [facility]  
   
                                                    Urea nitrogen   
[Mass/Vol]      6.0 mg/dL                       7.0-18.0        [facility]  
   
                                                    Urea   
nitrogen/Creatinine   
[Mass ratio]    7.2 mg/mg                                       [facility]  
   
                                                    Laboratory - Hematology and   
Cell countson 2024   
   
                                                    Immature   
granulocytes/100 WBC   
(Bld)           0.2 %                           0.0-0.5         [facility]  
   
                                                    Leukocytes [#/volume] correc  
vinh for nucleated erythrocytes in Blood by Automated  
   
counon 2024   
   
                                                    WBC corrected for nucl   
RBC Auto (Bld) [#/Vol] 6.1 10 3/uL                     4.0-11.0        [facility]  
   
                                                    Lymphocytes Auto (Bld) [#/Vo  
l]on 2024   
   
                                                    Lymphocytes (Bld)   
[#/Vol]         2.2 10 3/uL                     1.2-3.8         [facility]  
   
                                                    Lymphocytes/100 WBC Auto (Bl  
d)on 2024   
   
                                                    Lymphocytes/100 WBC   
(Bld)           36.4 %                          20.5-60.0       [facility]  
   
                                                    MCH Auto (RBC) [Entitic mass  
]on 2024   
   
                                                    MCH (RBC) [Entitic   
mass]           28.6 pg                         26.7-34.0       [facility]  
   
                                                    MCHC Auto (RBC) [Mass/Vol]on  
 2024   
   
                      MCHC (RBC) [Mass/Vol] 32.0 g/dL             29.9-35.2  [facility]  
   
                                                    MCV Auto (RBC) [Entitic vol]  
on 2024   
   
                                                    MCV (RBC) [Entitic   
vol]            89.4 fL                         81.0-99.0       [facility]  
   
                                                    Monocytes Auto (Bld) [#/Vol]  
on 2024   
   
                                                    Monocytes (Bld)   
[#/Vol]         0.5 10 3/uL                     0.3-0.8         [facility]  
   
                                                    Monocytes/100 WBC Auto (Bld)  
on 2024   
   
                                                    Monocytes/100 WBC   
(Bld)           7.8 %                           1.7-12.0        [facility]  
   
                                                    Neutrophils Auto (Bld) [#/Vo  
l]on 2024   
   
                                                    Neutrophils (Bld)   
[#/Vol]         3.3 10 3/uL                     1.4-6.5         [facility]  
   
                                                    Neutrophils/100 WBC Auto (Bl  
d)on 2024   
   
                                                    Neutrophils/100 WBC   
(Bld)           53.8 %                          43.0-75.0       [facility]  
   
                                                    No Panel Informationon    
   
                      Eosinophils # (Auto) 0.1 10 3/uL            0.0-0.7    [facility]  
   
                                                    Immature Granulocyte #   
(Auto)          0.01 10 3/uL                    0.00-0.03       [facility]  
   
                                                    Platelet mean volume Auto (B  
ld) [Entitic vol]on 2024   
   
                                                    Platelet mean volume   
(Bld) [Entitic vol] 10.7 fL                         9.5-13.5        [facility]  
   
                                                    Platelets Auto (Bld) [#/Vol]  
on 2024   
   
                                                    Platelets (Bld)   
[#/Vol]         296 10 3/uL                     150-450         [facility]  
   
                                                    RBC Auto (Bld) [#/Vol]on    
   
                      RBC (Bld) [#/Vol] 4.61 10 6/uL            4.20-5.40  [facility]  
   
                                                    Serum or plasma albumin/glob  
ulin mass ratioon 2024   
   
                                                    Albumin/Globulin [Mass   
ratio]          0.6 {ratio}                                     [facility]  
   
                                                    Serum or plasma anion gap de  
terminationon 2024   
   
                      Anion gap [Moles/Vol] 9.9 mmol/L                       [facility]  
   
                                                    PROF CHEM 8 (BAS METB)on    
   
                      Anion gap [Moles/Vol] 14.7 mmol/L Normal                [facility]  
   
                                        Comment on above:   Performed By: #### L  
RAY [id number] ####  
[facility]  
[address] [place] [address]  
Dr. Mae Thompson   
   
                      Calcium [Mass/Vol] 9.3 mg/dL  Normal     8.5-10.1   [facility]  
   
                                        Comment on above:   Performed By: #### L  
RAY [id number] ####  
[facility]  
[address] [address]  
Dr. Mae Thompson   
   
                      Chloride [Moles/Vol] 99 mmol/L  Normal          [facility]  
   
                                        Comment on above:   Performed By: #### L  
RAY [id number] ####  
[facility]  
[address] [address]  
Dr. Mae Thompson   
   
                      CO2 [Moles/Vol] 29.3 mmol/L Normal     21.0-32.0  [facility]  
   
                                        Comment on above:   Performed By: #### L  
RAY [id number] ####  
[facility]  
[address] [address]  
Dr. Mae Thompson   
   
                      Creatinine [Mass/Vol] 0.85 mg/dL Normal     0.55-1.02  [facility]  
   
                                        Comment on above:   Performed By: #### L  
IPID, [id number] ####  
[facility]  
[address] [address]  
Dr. Mae Thompson   
   
                      EGFR-AF AMERICAN >60        Normal     >=60       [facility]  
   
                                        Comment on above:   Performed By: #### L  
IPID, [id number] ####  
[facility]  
[address] [address]  
Dr. Mae Thompson   
   
                      EGFR-NON AF AMERICAN >60        Normal     >=60       [facility]  
   
                                        Comment on above:   Performed By: #### L  
IPID, [id number] ####  
[facility]  
[address] [place] [address]  
Dr. Mae Thompson   
   
                      Glucose [Mass/Vol] 89 mg/dL   Normal          [facility]  
   
                                        Comment on above:   Performed By: #### L  
IPID, [id number] ####  
[facility]  
[address] [address]  
Dr. Mae Thompson   
   
                      Potassium [Moles/Vol] 4.0 mmol/L Normal     3.5-5.1    [facility]  
   
                                        Comment on above:   Performed By: #### L  
IPID, [id number] ####  
[facility]  
[address] [address]  
Dr. Mae Thompson   
   
                      Sodium [Moles/Vol] 139 mmol/L Normal     136-145    [facility]  
   
                                        Comment on above:   Performed By: #### L  
IPID, [id number] ####  
[facility]  
[address] [address]  
Dr. Mae Thompson   
   
                                                    Urea nitrogen   
[Mass/Vol]      9.0 mg/dL       Normal          7.0-18.0        [facility]  
   
                                        Comment on above:   Performed By: #### L  
IPID, [id number] ####  
[facility]  
[address] [address]  
Dr. Mae Thompson   
   
                                                    Urea   
nitrogen/Creatinine   
[Mass ratio]    10.6 mg/mg      Normal                          [facility]  
   
                                        Comment on above:   Performed By: #### L  
IPID, [id number] ####  
[facility]  
[address] [address]  
Dr. Mae Thompson   
   
                                                    XR HAND ALEXIS MIN 3Von   
023   
   
                                        XR HAND ALEXIS MIN 3V  EXAMINATION: XR HAND  
 ALEXIS   
MIN 3V, XR WRIST ALEXIS MIN   
3 V  
HISTORY: Pain of   
bilateral hands  
COMPARISON: No relevant   
comparison available.  
FINDINGS:  
RIGHT FINDINGS:  
BONES: No significant   
arthropathy or acute   
abnormality.  
SOFT TISSUES: No visible   
soft tissue swelling.  
OTHER: Negative.  
LEFT FINDINGS:  
BONES: Small cortical   
erosion along the medial   
head of first metacarpal   
and  
medial base of fifth   
metacarpal. Partial   
subluxation of the first  
carpal-metacarpal joint   
bilaterally. No fracture,   
dislocation, bone lesion.  
SOFT TISSUES: No visible   
soft tissue swelling.  
OTHER: Negative.  
IMPRESSION:  
RIGHT CONCLUSION: No   
significant findings to   
suggest rheumatoid   
arthritis.  
Laxity/mild subluxation   
of the first   
metacarpophalangeal   
joint. Relative  
arthritic/degenerative   
sparing of the remaining   
joints.  
LEFT CONCLUSION: Small   
erosion involving the   
first and fifth   
metacarpals which  
can be seen with   
rheumatoid arthritis.   
Relative   
arthritic/degenerative   
sparing  
of the joints.  
Electronically   
authenticated by: ROGELIO VELEZ Date: 2023   
08:38               Normal                                  [facility]  
   
                                                    CBC AUTO DIFFon 04-   
   
                      BASO #     0.0 103/ul Normal     0.0-0.1    [facility]  
   
                                        Comment on above:   Performed By: #### C  
BC ####  
[facility]  
[address] [address]  
Dr. Mae Thompson   
   
                                                    Basophils/100 WBC   
(Bld)           0.3 %           Normal          0.2-2.0         [facility]  
   
                                        Comment on above:   Performed By: #### C  
BC ####  
[facility]  
[address] [address]  
Dr. Mae Thompson   
   
                      EO #       0.2 103/ul Normal     0.0-0.7    [facility]  
   
                                        Comment on above:   Performed By: #### C  
BC ####  
[facility]  
[address] [address]  
Dr. Mae Thompson   
   
                                                    Eosinophils/100 WBC   
(Bld)           2.0 %           Normal          0.9-7.0         [facility]  
   
                                        Comment on above:   Performed By: #### C  
BC ####  
[facility]  
[address] [place] [address]  
Dr. Mae Thompson   
   
                                                    Erythrocyte   
distribution width   
(RBC) [Ratio]   15.9 %          Critically high 11.0-15.0       [facility]  
   
                                        Comment on above:   Performed By: #### C  
BC ####  
[facility]  
[address] [place] [address]  
Dr. Mae Thompson   
   
                                                    Hematocrit (Bld)   
[Volume fraction] 44.3 %          Normal          36.0-48.0       [facility]  
   
                                        Comment on above:   Performed By: #### C  
BC ####  
[facility]  
[address] [place] [address]  
Dr. Mae Thompson   
   
                                                    Hemoglobin (Bld)   
[Mass/Vol]      14.3 g/dL       Normal          12.0-16.0       [facility]  
   
                                        Comment on above:   Performed By: #### C  
BC ####  
[facility]  
[address] [place] [address]  
Dr. Mae Thompson   
   
                      IG #       0.03 10e3/ul Normal     0.00-0.03  [facility]  
   
                                        Comment on above:   Performed By: #### C  
BC ####  
[facility]  
[address] [place] [address]  
Dr. Mae Thompson   
   
                      IG %       0.3 %      Normal     0.0-0.5    [facility]  
   
                                        Comment on above:   Performed By: #### C  
BC ####  
[facility]  
[address] [place] [address]  
Dr. Mae Thompson   
   
                      LYMPH #    2.4 103/ul Normal     1.2-3.8    [facility]  
   
                                        Comment on above:   Performed By: #### C  
BC ####  
[facility]  
[address] [place] [address]  
Dr. Mae Thompson   
   
                                                    Lymphocytes/100 WBC   
(Bld)           24.2 %          Normal          20.5-60.0       [facility]  
   
                                        Comment on above:   Performed By: #### C  
BC ####  
[facility]  
[address] [place] [address]  
Dr. Mae Thompson   
   
                      MANUAL DIFF REQ NO         Normal                [facility]  
   
                                        Comment on above:   Performed By: #### C  
BC ####  
[facility]  
[address] [address]  
Dr. Mae Thompson   
   
                                                    MCH (RBC) [Entitic   
mass]           29.1 pg         Normal          26.7-34.0       [facility]  
   
                                        Comment on above:   Performed By: #### C  
BC ####  
[facility]  
[address] [place] [address]  
Dr. Mae Thompson   
   
                      MCHC (RBC) [Mass/Vol] 32.3 g/dL  Normal     29.9-35.2  The  
 [facility]  
   
                                        Comment on above:   Performed By: #### C  
BC ####  
[facility]  
[address] [place] [address]  
Dr. Mae Thompson   
   
                                                    MCV (RBC) [Entitic   
vol]            90.2 fL         Normal          81.0-99.0       [facility]  
   
                                        Comment on above:   Performed By: #### C  
BC ####  
[facility]  
[address] [place] [address]  
Dr. Mae Thompson   
   
                      MONO #     0.9 103/ul Critically high 0.3-0.8    [facility]  
   
                                        Comment on above:   Performed By: #### C  
BC ####  
[facility]  
[address] [place] [address]  
Dr. Mae Thompson   
   
                                                    Monocytes/100 WBC   
(Bld)           9.1 %           Normal          1.7-12.0        [facility]  
   
                                        Comment on above:   Performed By: #### C  
BC ####  
[facility]  
[address] [place] [address]  
Dr. Mae Thompson   
   
                      NEUT #     6.2 103/ul Normal     1.4-6.5    [facility]  
   
                                        Comment on above:   Performed By: #### C  
BC ####  
[facility]  
[address] [place] [address]  
Dr. Mae Thompson   
   
                                                    Neutrophils/100 WBC   
(Bld)           64.1 %          Normal          43.0-75.0       [facility]  
   
                                        Comment on above:   Performed By: #### C  
BC ####  
[facility]  
[address] [place] [address]  
Dr. Mae Thompson   
   
                                                    Platelet mean volume   
(Bld) [Entitic vol] 10.5 fL         Normal          9.5-13.5        [facility]  
   
                                        Comment on above:   Performed By: #### C  
BC ####  
[facility]  
[address] [place] [address]  
Dr. Mae Thompson   
   
                      PLT        341 103/ul Normal     150-450    [facility]  
   
                                        Comment on above:   Performed By: #### C  
BC ####  
[facility]  
[address] [place] [address]  
Dr. Mae Thompson   
   
                      RBC        4.91 106/ul Normal     4.20-5.40  [facility]  
   
                                        Comment on above:   Performed By: #### C  
BC ####  
[facility]  
[address] [place] [address]  
Dr. Mae Thompson   
   
                      WBC        9.7 103/ul Normal     4.0-11.0   [facility]  
   
                                        Comment on above:   Performed By: #### C  
BC ####  
[facility]  
[address] [place] [address]  
Dr. Mae Thompson   
   
                                                    CRPon 04-   
   
                      CRP [Mass/Vol] mg/L       Normal     <=1.0      [facility]  
   
                                        Comment on above:   Performed By: #### L  
MIMAID [email or web address] ####  
[facility]  
[address] [place] [address]  
Dr. Mae Thompson   
   
                                                    LIPID PROFILEon 04-   
   
                      CHOL-HDL RATIO NORM SEE BELOW  Normal                [facility]  
   
                                        Comment on above:   Result Comment: 3.3   
- 4.4 LOW RISK 4.4 - 7.1 AVERAGE RISK 7.1   
-   
11.0 MODERATE RISK >11.0 HIGH RISK   
   
                                                            Performed By: #### L  
IPID [email or web address] ####  
[facility]  
[address] [place] [address]  
Dr. Mae Thompson   
   
                      Cholesterol [Mass/Vol] 146 mg/dL  Normal     <=200      [facility]  
[facility]  
   
                                        Comment on above:   Performed By: #### L  
IPID, [id number] ####  
[facility]  
[address] [place] [address]  
Dr. Mae Thompson   
   
                                                    Cholesterol in HDL   
[Mass/Vol]      43 mg/dL        Normal          40-60           [facility]  
   
                                        Comment on above:   Performed By: #### L  
IPID, [id number] ####  
[facility]  
[address] [place] [address]  
Dr. Mae Thompson   
   
                                                    Cholesterol in LDL   
[Mass/Vol]      76.2 mg/dL      Normal                          [facility]  
   
                                        Comment on above:   Performed By: #### L  
IPID, [email or web address] ####  
[facility]  
[address] [place] [address]  
Dr. Mae Thompson   
   
                                                    Cholesterol.total/Chol  
esterol in HDL [Mass   
ratio]          3.4 {ratio}     Normal                          [facility]  
   
                                        Comment on above:   Performed By: #### L  
IPID, [email or web address] ####  
[facility]  
[address] [address]  
Dr. Mae Thompson   
   
                                        HDL NORMAL          > or = 60 mg/dl - LO  
W   
CARDIOVASCULAR RISK <40   
mg/dl - HIGH   
CARDIOVASCULAR RISK Normal                                  [facility]  
   
                                        Comment on above:   Performed By: #### L  
IPID, [email or web address] ####  
[facility]  
[address] [place] [address]  
Dr. Mae Thompson   
   
                      LDL CALC NORMAL SEE BELOW  Normal                [facility]  
   
                                        Comment on above:   Result Comment: <100  
 mg/dl OPTIMAL 100 - 129 mg/dl NEAR OR   
ABOVE OPTIMAL 130 - 159 mg/dl BORDERLINE HIGH 160 - 189 mg/dl   
HIGH >190 mg/dl VERY HIGH   
   
                                                            Performed By: #### L  
IPID, [email or web address] ####  
[facility]  
[address] [place] [address]  
Dr. Mae Thompson   
   
                                                    Triglyceride   
[Mass/Vol]      134 mg/dL       Normal          <=150           [facility]  
   
                                        Comment on above:   Performed By: #### L  
IPID, [email or web address] ####  
[facility]  
[address] [address]  
Dr. Mae Thompson   
   
                      VLDL CALC  26.8 mg/dL Normal                [facility]  
   
                                        Comment on above:   Performed By: #### L  
IPID, [email or web address] ####  
[facility]  
[address] [address]  
Dr. Mae Thompson   
   
                                                    PROF 14(COMP METB)on 04-10-2  
023   
   
                      Albumin [Mass/Vol] 3.6 g/dL   Normal     3.4-5.0    [facility]  
   
                                        Comment on above:   Performed By: #### L  
IPID, [email or web address] ####  
[facility]  
[address] [place] [address]  
Dr. Mae Thompson   
   
                                                    Albumin/Globulin [Mass   
ratio]          0.8 {ratio}     Normal                          [facility]  
   
                                        Comment on above:   Performed By: #### L  
IPID [email or web address] ####  
[facility]  
[address] [place] [address]  
Dr. Mae Thompson   
   
                                                    ALP [Catalytic   
activity/Vol]   99 U/L          Normal                    [facility]  
   
                                        Comment on above:   Performed By: #### L  
IPID [id number] ####  
[facility]  
[address] [place] [address]  
Dr. Mae Thompson   
   
                                                    ALT [Catalytic   
activity/Vol]   21 U/L          Normal          14-59           [facility]  
   
                                        Comment on above:   Performed By: #### L  
IPID [email or web address] ####  
[facility]  
[address] [place] [address]  
Dr. Mae Thompson   
   
                      Anion gap [Moles/Vol] 12.9 mmol/L Normal                [facility]  
   
                                        Comment on above:   Performed By: #### L  
IPID [email or web address] ####  
[facility]  
[address] [place] [address]  
Dr. Mae Thompson   
   
                                                    AST [Catalytic   
activity/Vol]   54 U/L          Critically high 15-37           [facility]  
   
                                        Comment on above:   Performed By: #### L  
IPID [id number] ####  
[facility]  
[address] [place] [address]  
Dr. Mae Thompson   
   
                      Bilirubin [Mass/Vol] 0.7 mg/dL  Normal     0.2-1.0    [facility]  
   
                                        Comment on above:   Performed By: #### L  
IPID, [email or web address] ####  
[facility]  
[address] [place] [address]  
Dr. Mae Thompson   
   
                      Calcium [Mass/Vol] 10.1 mg/dL Normal     8.5-10.1   [facility]  
   
                                        Comment on above:   Performed By: #### L  
IPID, [email or web address] ####  
[facility]  
[address] [place] [address]  
Dr. Mae Thompson   
   
                      Chloride [Moles/Vol] 101 mmol/L Normal          [facility]  
   
                                        Comment on above:   Performed By: #### L  
IPID, [email or web address] ####  
[facility]  
[address] [address]  
Dr. Mae Thompson   
   
                      CO2 [Moles/Vol] 30.2 mmol/L Normal     21.0-32.0  [facility]  
   
                                        Comment on above:   Performed By: #### L  
IPID, [email or web address] ####  
[facility]  
[address] [address]  
Dr. Mae Thompson   
   
                      Creatinine [Mass/Vol] 0.93 mg/dL Normal     0.55-1.02  [facility]  
   
                                        Comment on above:   Performed By: #### L  
IPID, [id number] ####  
[facility]  
[address] [address]  
Dr. Mae Thompson   
   
                      EGFR-AF AMERICAN >60        Normal     >=60       [facility]  
   
                                        Comment on above:   Performed By: #### L  
IPID, [id number] ####  
[facility]  
[address] [address]  
Dr. Mae Thompson   
   
                      EGFR-NON AF AMERICAN >60        Normal     >=60       [facility]  
   
                                        Comment on above:   Performed By: #### L  
IPID, [email or web address] ####  
[facility]  
[address] [address]  
Dr. Mae Thompson   
   
                                                    Globulin (S)   
[Mass/Vol]      4.3 g/dL        Normal                          [facility]  
   
                                        Comment on above:   Performed By: #### L  
IPID, [id number] ####  
[facility]  
[address] [address]  
Dr. Mae Thompson   
   
                      Glucose [Mass/Vol] 100 mg/dL  Normal          [facility]  
   
                                        Comment on above:   Performed By: #### L  
IPID, [id number] ####  
[facility]  
[address] [address]  
Dr. Mae Thompson   
   
                      Potassium [Moles/Vol] 4.1 mmol/L Normal     3.5-5.1    [facility]  
   
                                        Comment on above:   Performed By: #### L  
IPID, [id number] ####  
[facility]  
[address] [address]  
Dr. aMe Thompson   
   
                      Protein [Mass/Vol] 7.9 g/dL   Normal     6.4-8.2    [facility]  
   
                                        Comment on above:   Performed By: #### L  
IPID, [email or web address] ####  
[facility]  
[address] [place] [address]  
Dr. Mae Thompson   
   
                      Sodium [Moles/Vol] 140 mmol/L Normal     136-145    [facility]  
   
                                        Comment on above:   Performed By: #### L  
IPID, [id number] ####  
[facility]  
[address] [place] [address]  
Dr. Mae Thompson   
   
                                                    Urea nitrogen   
[Mass/Vol]      10.0 mg/dL      Normal          7.0-18.0        [facility]  
   
                                        Comment on above:   Performed By: #### L  
IPID [id number] ####  
[facility]  
[address] [place] [address]  
Dr. Mae Thompson   
   
                                                    Urea   
nitrogen/Creatinine   
[Mass ratio]    10.8 mg/mg      Normal                          [facility]  
   
                                        Comment on above:   Performed By: #### L  
IPID [id number] ####  
[facility]  
[address] [place] [address]  
Dr. Mae Thompson   
   
                                                    SED RATE [place] 04-10-  
2023   
   
                      SED RATE   32 mm/hr   Critically high <=30       [facility]  
   
                                        Comment on above:   Performed By: #### S  
EDR ####  
[facility]  
[address] [place] [address]  
Dr. Mae Thompson   
   
                                                    TSH W/ REFLEX TO FT4on 04-10  
-2023   
   
                          TSH          0.637 uIU/mL Normal       0.358-3.74  
0                                       [facility]  
   
                                        Comment on above:   Performed By: #### L  
IPID, [id number] ####  
[facility]  
[address] [place] [address]  
Dr. Mae Thompson   
   
                                                    XR CHEST 2 Von 04-   
   
                                        XR CHEST 2 V        EXAM: XR CHEST 2 V  
HISTORY: Rheumatoid   
arthritis  
COMPARISON: 3/3/2022  
TECHNIQUE: PA and lateral   
views of the chest.  
FINDINGS:  
The cardiomediastinal   
silhouette is normal.  
No focal consolidation is   
identified.  
There is no pneumothorax.  
No pleural effusion is   
noted.  
The osseous structures   
are intact.  
IMPRESSION:  
No acute cardiopulmonary   
process.  
Electronically   
authenticated by: SALVADOR HUANG   
Date: 2023-04-10 12:48 Normal                                  [facility]  
   
                                                    Albumin [Mass/volume] in Ser  
um or PlasmaOrdered By: Cesario Joaquin on 2023  
   
   
                      Albumin [Mass/Vol] 4.2 g/dL              3.2-5.5    [facility]  
   
                                                    Basophils Auto (Bld) [#/Vol]  
Ordered By: Cesario Joaquin on 2023   
   
                                                    Basophils (Bld)   
[#/Vol]         0.1 10*3/uL                     0.0-0.2         [facility]  
   
                                                    Basophils/100 WBC Auto (Bld)  
Ordered By: Cesario Joaquin on 2023   
   
                                                    Basophils/100 WBC   
(Bld)           0.6 %                           .               [facility]  
   
                                                    Creatinine and Glomerular fi  
ltration rate.predicted panel (S/P/Bld)Ordered By:   
Cesario Joaquin on 2023   
   
                      Creatinine [Mass/Vol] 0.89 mg/dL            0.44-1.03  [facility]  
   
                                                    Eosinophils Auto (Bld) [#/Vo  
l]Ordered By: Cesario Joaquin on 2023   
   
                                                    Eosinophils (Bld)   
[#/Vol]         0.0 10*3/uL                     0.0-0.45        [facility]  
   
                                                    Eosinophils/100 WBC Auto (Bl  
d)Ordered By: Cesario Joaquin on 2023   
   
                                                    Eosinophils/100 WBC   
(Bld)           0.5 %                           .               [facility]  
   
                                                    Erythrocyte distribution wid  
th Auto (RBC) [Ratio]Ordered By: Cesario Joaquin on   
2023   
   
                                                    Erythrocyte   
distribution width   
(RBC) [Ratio]   14.8 %                          11.9-15.3       [facility]  
   
                                                    Erythrocyte sedimentation ra  
te by Photometric methodOrdered By: Cesario Joaquin   
on   
2023   
   
                                                    ESR Photometric method   
(Bld) [Velocity] 62 mm/hr                        0-29            [facility]  
   
                                                    Estimated glomerular filtrat  
ion rate (GFR) non- AmericanOrdered By:   
Cesario Joaquin on 2023   
   
                                                    GFR/1.73 sq   
M.predicted among   
non-blacks MDRD   
(S/P/Bld) [Vol   
rate/Area]      > 60 mL/Min                                     [facility]  
   
                                                    Globulin Calc (S) [Mass/Vol]  
Ordered By: Cesario Joaquin on 2023   
   
                                                    Globulin (S)   
[Mass/Vol]      3.4 g/dL                                        [facility]  
   
                                                    Hematocrit Auto (Bld) [Volum  
e fraction]Ordered By: Cesario Joaquin on 2023   
   
                                                    Hematocrit (Bld)   
[Volume fraction] 46.9 %                          34.0-46.4       [facility]  
   
                                                    Hemoglobin [Mass/volume] in   
BloodOrdered By: Cesario Joaquin on 2023   
   
                                                    Hemoglobin (Bld)   
[Mass/Vol]      15.2 g/dL                       11.8-15.4       [facility]  
   
                                                    Leukocytes [#/volume] correc  
vinh for nucleated erythrocytes in Blood by Automated  
   
counOrdered By: Cesario Joaquin on 2023   
   
                                                    WBC corrected for nucl   
RBC Auto (Bld) [#/Vol] 9.5 10*3/uL                     3.8-11.6        [facility]  
   
                                                    Lymphocytes Auto (Bld) [#/Vo  
l]Ordered By: Cesario Joaquin on 2023   
   
                                                    Lymphocytes (Bld)   
[#/Vol]         2.6 10*3/uL                     1.00-4.8        [facility]  
   
                                                    Lymphocytes/100 WBC Auto (Bl  
d)Ordered By: Cesario Joaquin on 2023   
   
                                                    Lymphocytes/100 WBC   
(Bld)           27.5 %                          .               [facility]  
   
                                                    MCH Auto (RBC) [Entitic mass  
]Ordered By: Cesario Joaquin on 2023   
   
                                                    MCH (RBC) [Entitic   
mass]           28.5 pg                         24.7-34.3       [facility]  
   
                                                    MCHC Auto (RBC) [Mass/Vol]Or  
dered By: Cesario Joaquin on 2023   
   
                      MCHC (RBC) [Mass/Vol] 32.4 g/dL             32.0-35.0  [facility]  
   
                                                    MCV Auto (RBC) [Entitic vol]  
Ordered By: Cesario Joaquin on 2023   
   
                                                    MCV (RBC) [Entitic   
vol]            88.1 fL                                   [facility]  
   
                                                    Monocytes Auto (Bld) [#/Vol]  
Ordered By: Cesario Joaquin on 2023   
   
                                                    Monocytes (Bld)   
[#/Vol]         1.0 10*3/uL                     0.0-0.8         [facility]  
   
                                                    Monocytes/100 WBC Auto (Bld)  
Ordered By: Cesario Joaquin on 2023   
   
                                                    Monocytes/100 WBC   
(Bld)           10.3 %                          .               [facility]  
   
                                                    Neutrophils Auto (Bld) [#/Vo  
l]Ordered By: Cesario Joaquin on 2023   
   
                                                    Neutrophils (Bld)   
[#/Vol]         5.8 10*3/uL                     1.8-7.7         [facility]  
   
                                                    Neutrophils/100 WBC Auto (Bl  
d)Ordered By: Cesario Joaquin on 2023   
   
                                                    Neutrophils/100 WBC   
(Bld)           61.1 %                          .               [facility]  
   
                                                    No Panel InformationOrdered   
By: Cesario Joaquin on 2023   
   
                                                    Estimated GFR (   
American)       > 60 mL/Min                                     [facility]  
   
                                        Comment on above:   GFR estimated refere  
nce range: According to KDOQI guidelines,   
<60 ml/min/1.73m2 is sufficient to diagnose a patient with   
chronic kidney disease.   
   
                                                    Pharmacy Creatinine   
Clearance (Chem N/A                                             [facility]  
   
                                                    Nucleated erythrocytes [Pres  
ence] in Blood by Automated countOrdered By: Cesario Joaquin on 2023   
   
                                                    Nucleated RBC Auto Ql   
(Bld)           0.1 /100{WBC}                   0-0.5           [facility]  
   
                                                    Platelet mean volume Auto (B  
ld) [Entitic vol]Ordered By: Cesario Joaquin on   
2023   
   
                                                    Platelet mean volume   
(Bld) [Entitic vol] 10.2 fL                         6.3-10.7        [facility]  
   
                                                    Platelets Auto (Bld) [#/Vol]  
Ordered By: Cesario Joaquin on 2023   
   
                                                    Platelets (Bld)   
[#/Vol]         357 10*3/uL                     150-450         [facility]  
   
                                                    Protein [Mass/volume] in Ser  
um or PlasmaOrdered By: Cesario Joaquin on 2023  
   
   
                      Protein [Mass/Vol] 7.6 g/dL              6.1-7.9    [facility]  
   
                                                    RBC Auto (Bld) [#/Vol]Ordere  
d By: Cesario Joaquin on 2023   
   
                      RBC (Bld) [#/Vol] 5.32 10*6/uL            3.60-5.00  [facility]  
   
                                                    Serum or plasma alanine amin  
otransferase measurement without P-5'-P (enzymatic   
activiOrdered By: Cesario Joaquin on 2023   
   
                                                    ALT No additional   
P-5'-P [Catalytic   
activity/Vol]   12 U/L                          10-60           [facility]  
   
                                                    Serum or plasma albumin/glob  
ulin mass ratioOrdered By: Cesario Joaquin on   
2023   
   
                                                    Albumin/Globulin [Mass   
ratio]          1.2 {ratio}                                     [facility]  
   
                                                    Serum or plasma alkaline amanda  
sphatase measurement (enzymatic   
activity/volume)Ordered   
By: Cesario Joaquin on 2023   
   
                                                    ALP [Catalytic   
activity/Vol]   105 U/L                         32-92           [facility]  
   
                                                    Serum or plasma anion gap de  
terminationOrdered By: Cesario Joaquin on 2023   
   
                      Anion gap [Moles/Vol] 12.9 mmol/L            6.0-15.0   [facility]  
   
                                                    Serum or plasma aspartate am  
inotransferase measurement (enzymatic   
activity/volume)Ordered By: Cesario Joaquin on 2023   
   
                                                    AST [Catalytic   
activity/Vol]   16 U/L                          10-42           [facility]  
   
                                                    Serum or plasma calcium sue  
urement (mass/volume)Ordered By: Cesario Joaquin on   
2023   
   
                      Calcium [Mass/Vol] 9.9 mg/dL             8.2-10.2   [facility]  
   
                                                    Serum or plasma chloride manjit  
surement (moles/volume)Ordered By: Cesario Joaquin on  
   
2023   
   
                      Chloride [Moles/Vol] 99 mmol/L                  [facility]  
   
                                                    Serum or plasma glucose sue  
urement (mass/volume)Ordered By: Cesario Joaquin on   
2023   
   
                      Glucose [Mass/Vol] 91 mg/dL                   [facility]  
   
                                        Comment on above:   ADA recommended refe  
rence rangeRandom Glucose Reference Range   
is dependent on time and content of last meal. Glucose of more   
than 200 mg/dL in a nonstressed, ambulatory subject supports   
the diagnosis of Diabetes Mellitus.   
   
                                                    Serum or plasma potassium me  
asurement (moles/volume)Ordered By: Cesario Joaquin   
on   
2023   
   
                      Potassium [Moles/Vol] 4.5 mmol/L            3.5-5.1    [facility]  
   
                                                    Serum or plasma sodium measu  
rement (moles/volume)Ordered By: Cesario Joaquin on   
2023   
   
                      Sodium [Moles/Vol] 134 mmol/L            136-146    [facility]  
   
                                                    Serum or plasma total biliru  
bin measurement (mass/volume)Ordered By: Cesario Joaquin   
on 2023   
   
                      Bilirubin [Mass/Vol] 0.7 mg/dL             0.3-1.2    [facility]  
   
                                                    Serum or plasma total carbon  
 dioxide measurement (moles/volume)Ordered By:   
Cesario Joaquin on 2023   
   
                      CO2 [Moles/Vol] 26.6 mmol/L            22.0-30.0  [facility]  
   
                                                    Serum or plasma urea nitroge  
n measurement (mass/volume)Ordered By: Cesario Joaquin on   
2023   
   
                                                    Urea nitrogen   
[Mass/Vol]      10 mg/dL                                    [facility]  
   
                                                    WBC Auto (Bld) [#/Vol]Ordere  
d By: Cesario Joaquin on 2023   
   
                      WBC (Bld) [#/Vol] 9.5 10*3/uL            3.8-11.6   [facility]  
   
                                                    Urinalysis - AUTOMATEDon    
   
                      Appearance (U) Cloudy                           [facility] [place]  
[employer]   
[employer]   
[employer]  
Other Phone:   
[phone]  
   
                      Bilirubin Ql (U) Negative                         [facility]  
[employer]   
[employer]   
[employer]  
Other Phone:   
[phone]  
   
                      Color (U)  Light Yellow                       [facility]   
[employer]   
[employer]  
Other Phone:   
[phone]  
   
                      Glucose Ql (U) 250                              [place]  
[employer]   
[employer]   
[employer]  
Other Phone:   
[phone]  
   
                      Hemoglobin Ql (U) Small                            [employer]  
Other Phone:   
[phone]  
   
                      Ketones Ql (U) Negative                         [place]  
[employer]   
[employer]   
[employer]  
Other Phone:   
[phone]  
   
                                                    Leukocyte esterase   
Test strip Ql (U) Trace                                           [facility]   
[employer]  
Other Phone:   
[phone]  
   
                      Nitrite Ql (U) Negative                         [place]  
[employer]   
[employer]   
[employer]  
Other Phone:   
[phone]  
   
                      pH (U)     5.0 [pH]                         [facility]   
[employer]   
[employer]  
Other Phone:   
[phone]  
   
                      Protein Ql (U) Negative                         [place]  
[employer]   
[employer]   
[employer]  
Other Phone:   
[phone]  
   
                                                    Specific gravity (U)   
[Rel density]   1.015                                           [facility]   
[employer]   
[employer]  
Other Phone:   
[phone]  
   
                                                    Urobilinogen (U)   
[Mass/Vol]      0.2 mg/dL                                       [facility]   
[employer]  
Other Phone:   
[phone]  
   
                      Urinalysis - AUTOMATED                                  [employer]  
[employer]  
Other Phone:   
[phone]  
   
                                                    Urine Cultureon 2022   
   
                                                    Bacteria identified Cx   
Nom (U)                                                         [facility]   
[employer]  
Other Phone:   
[phone]  
   
                                                    Urine culture routineOrdered  
 By: Pierce Wallace on 2022   
   
                                                    Bacteria identified Cx   
Nom (U)         bacilli - 2 Days                                 [facility]  
   
                                                    MAGNESIUMon 11-   
   
                      Magnesium [Mass/Vol] 2.1 mg/dL  Normal     1.8-2.4    [facility]  
   
                                        Comment on above:   Performed By: #### C  
MP, MG, TSH ####  
[facility]  
[address] [place] [address]  
Dr. Mae Thompson   
   
                                                    PROF 14(COMP METB)on 11-15-2  
022   
   
                      Albumin [Mass/Vol] 3.5 g/dL   Normal     3.4-5.0    [facility]  
   
                                        Comment on above:   Performed By: #### C  
MP, MG, TSH ####  
[facility]  
[address] [place] [address]  
Dr. Mae Thompson   
   
                                                    Albumin/Globulin [Mass   
ratio]          0.8 {ratio}     Normal                          [facility]  
   
                                        Comment on above:   Performed By: #### C  
MP, MG, TSH ####  
[facility]  
[address] [place] [address]  
Dr. Mae Thompson   
   
                                                    ALP [Catalytic   
activity/Vol]   103 U/L         Normal                    [facility]  
   
                                        Comment on above:   Performed By: #### C  
MP, MG, TSH ####  
[facility]  
[address] [place] [address]  
Dr. Mae Thompson   
   
                                                    ALT [Catalytic   
activity/Vol]   13 U/L          Critically low  14-59           [facility]  
   
                                        Comment on above:   Performed By: #### C  
MP, MG, TSH ####  
[facility]  
[address] [place] [address]  
Dr. Mae Thompson   
   
                      Anion gap [Moles/Vol] 9.8 mmol/L Normal                [facility]  
   
                                        Comment on above:   Performed By: #### C  
MP, MG, TSH ####  
[facility]  
[address] [place] [address]  
Dr. Mae Thompson   
   
                                                    AST [Catalytic   
activity/Vol]   39 U/L          Critically high 15-37           [facility]  
   
                                        Comment on above:   Performed By: #### C  
MP, MG, TSH ####  
[facility]  
[address] [place] [address]  
Dr. Mae Thompson   
   
                      Bilirubin [Mass/Vol] 0.5 mg/dL  Normal     0.2-1.0    [facility]  
   
                                        Comment on above:   Performed By: #### C  
MP, MG, TSH ####  
[facility]  
[address] [place] [address]  
Dr. Mae Thompson   
   
                      Calcium [Mass/Vol] 9.7 mg/dL  Normal     8.5-10.1   [facility]  
   
                                        Comment on above:   Performed By: #### C  
MP, MG, TSH ####  
[facility]  
[address] [place] [address]  
Dr. Mae Thompson   
   
                      Chloride [Moles/Vol] 102 mmol/L Normal          [facility]   
[facility]  
   
                                        Comment on above:   Performed By: #### C  
MP, MG, TSH ####  
[facility]  
[address] [place] [address]  
Dr. Mae Thompson   
   
                      CO2 [Moles/Vol] 29.0 mmol/L Normal     21.0-32.0  [facility]  
   
                                        Comment on above:   Performed By: #### C  
MP, MG, TSH ####  
[facility]  
[address] [place] [address]  
Dr. Mae Thompson   
   
                      Creatinine [Mass/Vol] 0.94 mg/dL Normal     0.55-1.02  [facility]  
   
                                        Comment on above:   Performed By: #### C  
MP, MG, TSH ####  
[facility]  
[address] [place] [address]  
Dr. Mae Thompson   
   
                      EGFR-AF AMERICAN >60        Normal     >=60       [facility]  
   
                                        Comment on above:   Performed By: #### C  
MP, MG, TSH ####  
[facility]  
[address] [place] [address]  
Dr. Mae Thompson   
   
                      EGFR-NON AF AMERICAN =60        Normal     >=60       The   
[facility]  
   
                                        Comment on above:   Performed By: #### C  
MP, MG, TSH ####  
[facility]  
[address] [place] [address]  
Dr. Mae Thompson   
   
                                                    Globulin (S)   
[Mass/Vol]      4.5 g/dL        Normal                          [facility]  
   
                                        Comment on above:   Performed By: #### C  
MP, MG, TSH ####  
[facility]  
[address] [address]  
Dr. Mae Thompson   
   
                      Glucose [Mass/Vol] 89 mg/dL   Normal          [facility]  
   
                                        Comment on above:   Performed By: #### C  
MP, MG, TSH ####  
[facility] [facility]  
[address] [place] [address]  
Dr. Mae Thompson   
   
                      Potassium [Moles/Vol] 3.8 mmol/L Normal     3.5-5.1    [facility]  
   
                                        Comment on above:   Performed By: #### C  
MP, MG, TSH ####  
[facility]  
[address] [place] [address]  
Dr. Mae Thompson   
   
                      Protein [Mass/Vol] 8.0 g/dL   Normal     6.4-8.2    [facility]  
   
                                        Comment on above:   Performed By: #### C  
MP, MG, TSH ####  
[facility]  
[address] [place] [address]  
Dr. Mae Thompson   
   
                      Sodium [Moles/Vol] 137 mmol/L Normal     136-145    [facility]  
   
                                        Comment on above:   Performed By: #### C  
MP, MG, TSH ####  
[facility]  
[address] [place] [address]  
Dr. Mae Thompson   
   
                                                    Urea nitrogen   
[Mass/Vol]      18.0 mg/dL      Normal          7.0-18.0        [facility]  
   
                                        Comment on above:   Performed By: #### C  
MP, MG, TSH ####  
[facility]  
[address] [place] [address]  
Dr. Mae Thompson   
   
                                                    Urea   
nitrogen/Creatinine   
[Mass ratio]    19.1 mg/mg      Normal                          [facility]  
   
                                        Comment on above:   Performed By: #### C  
MP, MG, TSH ####  
[facility]  
[address] [place] [address]  
Dr. Mae Thompson   
   
                                                    TSHon 11-   
   
                          TSH          1.188 uIU/mL Normal       0.358-3.74  
0                                       [facility]  
   
                                        Comment on above:   Performed By: #### C  
MP, MG, TSH ####  
[facility]  
[address] [address]  
Dr. Mae Thompson   
   
                                                    MAGNESIUMon 2022   
   
                      Magnesium [Mass/Vol] 2.1 mg/dL  Normal     1.8-2.4    [facility]  
   
                                        Comment on above:   Performed By: #### L  
IPID, TSHRFT4 ####  
[facility]  
[address] [place] [address]  
Dr. Mae Thompson   
   
                                                    PROF CHEM 8 (BAS METB)on    
   
                      Anion gap [Moles/Vol] 10.7 mmol/L Normal                [facility]  
   
                                        Comment on above:   Performed By: #### L  
IPID TSHRFT4 ####  
[facility]  
[address] [place] [address]  
Dr. Mae Thompson   
   
                      Calcium [Mass/Vol] 9.5 mg/dL  Normal     8.5-10.1   [facility]  
   
                                        Comment on above:   Performed By: #### L  
IPID, TSHRFT4 ####  
[facility]  
[address] [place] [address]  
Dr. Mae Thompson   
   
                      Chloride [Moles/Vol] 103 mmol/L Normal          [facility]  
   
                                        Comment on above:   Performed By: #### L  
IPID, TSHRFT4 ####  
[facility]  
[address] [place] [address]  
Dr. Mae Thompson   
   
                      CO2 [Moles/Vol] 30.4 mmol/L Normal     21.0-32.0  [facility]  
   
                                        Comment on above:   Performed By: #### L  
IPID, TSHRFT4 ####  
[facility]  
[address] [place] [address]  
Dr. Mae Thompson   
   
                      Creatinine [Mass/Vol] 1.13 mg/dL Critically high 0.55-1.02  
  [facility]  
   
                                        Comment on above:   Performed By: #### L  
IPID, TSHRFT4 ####  
[facility]  
[address] [address]  
Dr. Mae Thompson   
   
                      EGFR-AF AMERICAN 59 mL/min/1.73m2 Critically low >=60       
  [facility]  
   
                                        Comment on above:   Performed By: #### L  
IPID, TSHRFT4 ####  
[facility]  
[address] [address]  
Dr. Mae Thompson   
   
                      EGFR-NON AF AMERICAN 49 mL/min/1.73m2 Critically low >=60   
      [facility]  
   
                                        Comment on above:   Performed By: #### L  
IPID, TSHRFT4 ####  
[facility]  
[address] [address]  
Dr. Mae Thompson   
   
                      Glucose [Mass/Vol] 93 mg/dL   Normal          [facility]  
   
                                        Comment on above:   Performed By: #### L  
IPID, TSHRFT4 ####  
[facility]  
[address] [place] [address]  
Dr. Mae Thompson   
   
                      Potassium [Moles/Vol] 4.1 mmol/L Normal     3.5-5.1    [facility]  
   
                                        Comment on above:   Performed By: #### L  
IPID, TSHRFT4 ####  
[facility]  
[address] [address]  
Dr. Mae Thompson   
   
                      Sodium [Moles/Vol] 140 mmol/L Normal     136-145    [facility]  
   
                                        Comment on above:   Performed By: #### L  
IPID, TSHRFT4 ####  
[facility]  
[address] [address]  
Dr. Mae Thompson   
   
                                                    Urea nitrogen   
[Mass/Vol]      18.0 mg/dL      Normal          7.0-18.0        [facility]  
   
                                        Comment on above:   Performed By: #### L  
IPID, TSHRFT4 ####  
[facility]  
[address] [address]  
Dr. Mae Thompson   
   
                                                    Urea   
nitrogen/Creatinine   
[Mass ratio]    15.9 mg/mg      Normal                          [facility]  
   
                                        Comment on above:   Performed By: #### L  
IPID, TSHRFT4 ####  
[facility]  
[address] [place] [address]  
Dr. Mae Thompson   
   
                                                    Tobacco Screening.on   
022   
   
                                                    Adult depression   
screening assessment No                                              [facility]  
io   
[facility]  
y [address] DO  
Work Phone:   
[phone]  
0  
   
                                                    Tobacco use status   
[facility]            b) No                                           [facility]   
[facility]  
y [address] DO  
Work Phone:   
[phone]  
0  
   
                                                    Heart Rateon 2022   
   
                      Heart Rate Regular                          [facility]   
[facility]  
y 250 DO  
Work Phone:   
[phone]  
0  
   
                                                    Office Visit (Cardiology)on   
2022   
   
                                        Follow-up visit     Diagnoses/Problems  
Assessed  
Benign essential   
hypertension (401.1)   
(I10)  
Orders  
Benign essential   
hypertension  
Stop: Furosemide 40 MG   
Oral Tablet  
Persistent atrial   
fibrillation  
Stop: Potassium Chloride   
ER 10 MEQ Oral Tablet   
Extended Release  
Unlinked  
Stop: Potassium Chloride   
10 MEQ TBCR  
Patient Instructions  
By signing my name below,   
I, Evie Long LPN   
,Jai, attest that this   
documentation has been   
prepared under the   
direction and in the   
presence of Dr. Tomi Rodgers MD.  
All medical record   
entries made by the   
Scribe were at my   
direction and personally   
dictated by me. I have   
reviewed the chart and   
agree that the record   
accurately reflects my   
personal performance of   
the history, physical   
exam, discussion and   
plan.  
Please bring all   
medicines, vitamins, and   
herbal supplements with   
you when you come to the   
office.  
Prescriptions will not be   
filled unless you are   
compliant with your   
follow up appointments or   
have a follow up   
appointment scheduled as   
per instruction of your   
physician. Refills should   
be requested at the time   
of your visit.  
Keep appointment as   
scheduled  
  
Chief Complaint  
GIACOMO BERMAN is being seen   
for hypertension and BP   
Check.  
Patient is in the office   
for hypertension   
management since   
lisinopril 5 mg daily was   
added her blood pressure   
has become under control.   
She does not require   
diuretic therapy and   
potassium which I will   
discontinue.  
  
Active Problems Problems  
Benign essential   
hypertension (401.1)   
(I10)  
Assessed By: Neyda Regalado; Last Assessed: 22   
Mar 2022  
Current Meds  
  
Medication   
NameInstruction  
Albuterol Sulfate (2.5   
MG/3ML) 0.083% Inhalation   
Nebulization SolutionUSE   
AS DIRECTED.  
ALPRAZolam ER 1 MG Oral   
Tablet Extended Release   
24 Hour1 tablet three   
times daily as needed  
Atorvastatin Calcium 40   
MG Oral TabletTAKE 1   
TABLET AT BEDTIME.  
busPIRone HCl - 15 MG   
Oral TabletTAKE 1 TABLET   
TWICE DAILY.  
Clopidogrel Bisulfate 75   
MG Oral TabletTAKE 1   
TABLET BY MOUTH EVERY DAY  
Ergocalciferol 1.25 MG   
(97133 UT) Oral   
CapsuleTAKE 1 CAPSULE   
WEEKLY.  
Furosemide 40 MG Oral   
TabletTAKE ONE TABLET BY   
MOUTH DAILY AT 8 AM  
LaMICtal XR 50 MG Oral   
Tablet Extended Release   
24 HourTake 1 tablet   
daily  
Levothyroxine Sodium 88   
MCG Oral TabletTAKE 1   
TABLET DAILY AS DIRECTED.  
Lisinopril 5 MG Oral   
TabletTAKE 1 TABLET   
DAILY.  
Nitroglycerin 0.4 MG   
Sublingual Tablet   
SublingualPLACE 1 TABLET   
UNDER THE TONGUE EVERY 5   
MINUTES UP TO 3 DOSES AS   
NEEDED FOR CHEST PAIN.  
Pantoprazole Sodium 40 MG   
Oral Tablet Delayed   
ReleaseTAKE 1 TABLET   
DAILY.  
Potassium Chloride 10 MEQ   
TBCRTAKE 1 TABLET 3 TIMES   
DAILY.  
Potassium Chloride ER 10   
MEQ Oral Tablet Extended   
ReleaseTake 1 tablet by   
mouth three times a day  
Sotalol HCl - 120 MG Oral   
TabletTAKE 1/2 TABLET   
TWICE A DAY  
Warfarin Sodium 5 MG Oral   
TabletTAKE 1 TABLET DAILY   
AS DIRECTED by [facility]  
Zoloft 50 MG Oral   
TabletTAKE 1 AND 1/2   
TABLETS DAILY.  
Patient did not bring   
medications list or   
bottles. Updated verbally   
with patient.  
Allergies  
Medication  
Compazine  
Recorded By: Viviana Ring; 2022 7:02:42   
AM  
Vitals  
Vital Signs  
Recorded: 2022   
02:54PMRecorded:   
2022 02:51PM  
[id number], LUE,   
Uvmxtxq145, RUE, Sitting  
Zsnrtbwtz57, LUE,   
Nugjmlo62, RUE, Sitting  
Heart Rate56, L Radial  
Pulse QualityRegular, L   
Radial  
Height5 ft 4 in  
Nkjuwi195 lb  
BMI Xcujomkuay24.58 kg/m2  
BSA Calculated1.89  
Signatures  
Electronically signed by   
: Tomi Rodgers MD; Mar   
22 2022 5:17PM EST   
(Author)            Normal                                   [facility]  
   
                                                    Office Visit (Cardiology)on   
02-   
   
                                        Follow-up visit     Diagnoses/Problems  
Assessed  
Persistent atrial   
fibrillation (427.31)   
(I48.19)  
Status post   
radiofrequency ablation   
at   
High risk medication use   
(V58.69) (Z79.899)  
Benign essential   
hypertension (401.1)   
(I10)  
Class 1 obesity with body   
mass index (BMI) of 34.0   
to 34.9 in adult   
(278.00,V85.34)  
(E66.9,Z68.34)  
Dyslipidemia (272.4)   
(E78.5)  
S/P PTCA (percutaneous   
transluminal coronary   
angioplasty) (V45.82)   
(Z98.61)  
LAD 2019  
Orders  
Benign essential   
hypertension  
Start: Lisinopril 5 MG   
Oral Tablet; TAKE 1   
TABLET DAILY  
Benign essential   
hypertension,   
Dyslipidemia, High risk   
medication use,   
Persistent atrial  
fibrillation  
Lipid Panel;   
Status:Active; Requested   
for:2022;  
Benign essential   
hypertension, High risk   
medication use,   
Persistent atrial   
fibrillation  
ALT - Alanine   
Aminotransferase, Serum;   
Status:Active; Requested   
for:2022;  
AST; Status:Active;   
Requested for:2022;  
Basic Metabolic Panel;   
Status:Active; Requested   
for:2022;  
Complete Blood Count;   
Status:Active; Requested   
for:2022;  
SocHx: Never a smoker  
Tobacco Use Screening;   
Status:Complete; Done:   
2022  
Patient Instructions  
By signing my name below,   
IMyke LPN, Scribe, attest that this   
documentation has been   
prepared under the   
direction and in the   
presence of Dr. Tomi Rodgers MD.  
All medical record   
entries made by the   
Scribe were at my   
direction and personally   
dictated by me. I have   
reviewed the chart and   
agree that the record   
accurately reflects my   
personal performance of   
the history, physical   
exam, discussion and   
plan.  
Please bring all   
medicines, vitamins, and   
herbal supplements with   
you when you come to the   
office.  
Prescriptions will not be   
filled unless you are   
compliant with your   
follow up appointments or   
have a follow up   
appointment scheduled as   
per instruction of your   
physician. Refills should   
be requested at the time   
of your visit.  
Follow up in 6 months  
Blood pressure follow up   
in 3 weeks  
  
Chief Complaint  
GIACOMO BERMAN is being seen   
for a 6 month follow-up   
of.  
Patient is in the office   
for follow-up for the   
problems noted below.   
Since her last visit she   
has not had any major   
breakthrough atrial   
fibrillation. She is on   
sotalol 60 mg twice daily   
and Coumadin therapy. She   
continues to smoke half   
pack of cigarettes per   
day something I always   
discouraged her from   
doing. She has no   
symptoms suggestive   
angina pectoris or heart   
failure. Her weight is   
still working progress.   
Her examination is   
unremarkable for obesity   
and mild hypertension.   
EKG confirmed normal   
sinus rhythm and normal   
QTc interval.  
Assessment/recommendation  
s:  
1?coronary artery disease   
single vessel status post   
plasty to the LAD in   
2019. She will   
remain on Plavix along   
with statin therapy,   
there has been no recent   
angina pectoris patient   
remains at risk for   
recurrent disease due to   
active tobacco abuse,   
this was made clear to   
her  
2?paroxysmal atrial   
fibrillation status post   
radiofrequency ablation   
at    
with recurrent atrial   
fibrillation, presently   
on sotalol 60 mg twice   
daily. ECG confirmed   
normal sinus rhythm   
today. Patient remains on   
Coumadin  
3?hyperlipidemia on   
statin therapy , lipid   
profile is due and was   
requested  
4? obesity encouraged   
more weight control with   
diet and exercise  
5?hypertension on medical   
therapy not completely   
under control, will   
increase lisinopril up to   
5 mg daily and check her   
blood pressure readings   
in few weeks  
6?rheumatoid arthritis on   
methotrexate and   
prednisone followed by   
rheumatology  
7?high-risk medication   
with warfarin and sotalol   
with no complications.  
8?tobacco abuse, was   
counseled again on   
tobacco cessation  
  
  
Current Meds  
Medication   
NameInstruction  
Albuterol Sulfate (2.5   
MG/3ML) 0.083% Inhalation   
Nebulization SolutionUSE   
AS DIRECTED.  
ALPRAZolam ER 1 MG Oral   
Tablet Extended Release   
24 Hour1 tablet three   
times daily as needed  
Atorvastatin Calcium 40   
MG Oral TabletTAKE 1   
TABLET AT BEDTIME.  
busPIRone HCl - 15 MG   
Oral TabletTAKE 1 TABLET   
TWICE DAILY.  
Clopidogrel Bisulfate 75   
MG Oral TabletTAKE 1   
TABLET BY MOUTH EVERY DAY  
Ergocalciferol 1.25 MG   
(18194 UT) Oral   
CapsuleTAKE 1 CAPSULE   
WEEKLY.  
Furosemide 40 MG Oral   
TabletTAKE 1 TABLET   
DAILY.  
LaMICtal XR 50 MG Oral   
Tablet Extended Release   
24 HourTake 1 tablet   
daily  
Levothyroxine Sodium 88   
MCG Oral TabletTAKE 1   
TABLET DAILY AS DIRECTED.  
Lisinopril 2.5 MG Oral   
TabletTAKE 1 TABLET BY   
MOUTH EVERY DAY  
Nitroglycerin 0.4 MG   
Sublingual Tablet   
SublingualPLACE 1 TABLET   
UNDER THE TONGUE EVERY 5   
MINUTES UP TO 3 DOSES AS   
NEEDED FOR CHEST PAIN.  
Pantoprazole Sodium 40 MG   
Oral Tablet Delayed   
ReleaseTAKE 1 TABLET   
DAILY.  
Potassium Chloride 10 MEQ   
TBCRTAKE 1 TABLET 3 TIMES   
DAILY.  
Sotalol HCl - 120 MG Oral   
Tablet1/2 tablet twice a   
day  
Warfarin Sodium 5 MG Oral   
TabletTAKE 1 TABLET DAILY   
AS DIRECTED by [facility]  
Zoloft 50 MG Oral   
TabletTAKE 1 AND 1/2   
TABLETS DAILY.  
Allergies  
Medication  
Compazine  
Recorded By: Viviana Ring; 2022 7:02:42   
(more content not   
included)...        Normal                                   [facility]  
   
                                                    Tobacco Screening.on 02-10-2  
022   
   
                                                    Tobacco use status   
[facility]            b) No                                           [facility]   
[facility]  
y 250 DO  
Work Phone:   
[phone]  
0  
   
                                                    Daily Progress Note-Electrop  
hysiologyon 2020   
   
                                                    Daily Progress   
Note-Electrophysiology                  Service:   
Electrophysiology  
  
Subjective Data:  
GIACOMO BERMAN is a 59   
year old Female who is   
Hospital Day # 2.  
  
Additional Information:  
-Pt was examined at   
9:45AM and tele reviewed  
-Pt was seen by EP   
fellowJuan Diego Ty MD this   
AM also  
-denies   
CP/dyspnea/LH/palps;   
cannot feel that she is   
in AFL  
-pt is NPO for DCCV today   
per Dr Mullins  
  
  
Objective Data:  
  
Objective Information:  
T PRBPSpO2  
[id number]/8697%  
Date/Time 12:   
13: 13:   
13: 13:00  
Range(36.2C - 36.8C ) (75   
- 98 ) (11 - 26 ) (85 -   
115 )/ (48 - 86 ) (89%  
- 99% )  
Pain reported at    
12:00: 0 = None  
  
Tele reviewed: AFL 80s  
  
EKG 20 8:45AM   
reviewed by EP fellow   
also: atyp AFL 80s, no   
acute changes  
  
Physical Exam  
  
Constitutional: A+O x 3,   
no distress  
Eyes: Anicteric  
Head/Neck: NC/AT  
Respiratory/Thorax: CTAB  
Cardiovascular: irreg, no   
rub  
Gastrointestinal: soft,   
NT/ND +BS  
Extremities: no LE edema  
  
RFV access site-no   
bleeding, hematoma, or   
ecchymosis  
Psychological:   
Appropriate speech and   
affect.  
Skin: Warm and dry  
  
Medication  
  
Medications:  
  
Continuous Medications  
-------------------------  
-------  
No continuous medications   
are active  
  
Scheduled Medications  
-------------------------  
-------  
  
1. Aspirin Chewable: 81   
mg Oral Daily  
2. busPIRone (BUSPAR): 15   
mg Oral Every 12 Hours  
3. Clopidogrel: 75 mg   
Oral Daily  
4. dilTIAZem (CARDIZEM   
CD) Extended Release (24   
hour): 180 mg Oral Every   
24  
Hours  
5. Levothyroxine: 88   
microgram(s) Oral Daily  
6. Lisinopril: 5 mg Oral   
Daily  
7. Ophthalmic Ointment: 1   
application(s) Right Eye   
Every 12 Hours  
8. Pantoprazole: 40 mg   
Oral Daily  
9. predniSONE: 2.5 mg   
Oral Every 24 Hours  
10. Warfarin: 5 mg Oral   
Daily  
  
PRN Medications  
-------------------------  
-------  
  
1. Acetaminophen: 650 mg   
Oral Every 6 Hours  
2. ALPRAZolam: 0.5 mg   
Oral Every 8 Hours  
3. Morphine Injectable: 1   
mg IntraVenous Push Every   
4 Hours  
4. Ondansetron   
Injectable: 4 mg   
IntraVenous Push Every 8   
Hours  
5. Zolpidem: 5 mg Oral At   
Bedtime  
  
  
Recent Lab Results  
  
Results:  
  
  
I have reviewed these   
laboratory results:  
  
PT + INR, Plasma   
-2020 11:23:00  
  
ResultValue  
Prothrombin Time, Plasma   
28.5 H  
International Normalized   
Ratio, Plasma 2.5 H  
  
  
Assessment and Plan:  
Assessment:  
60yo F with HTN, HLD,   
CAD/PCI LAD 2019, CVA   
, RA on Pred, and AF   
on  
Coumadin (managed by   
[facility] coumadin   
clinic). Follows with   
cards Dr Rodgers. Underwent PVI   
and atypical AFL ablation   
yesterday but went back   
into  
AFL last night. Per   
Fellow, POCT INR   
yesterday was 1.9;   
Lovenox 1mg/kg x 1  
given per Dr Mullins while   
awaiting stat INR, which   
is 2.5 today. Awaiting   
DCCV  
then will be dc'd home.  
  
*DCCV unsuccessful x 2   
shocks. Pt really wants   
to go home today. Per Dr Mullins, kristian diltiazem ER   
and start Amio 400mg 3x/d   
x 3 days, then 200mg   
daily  
(mini-load). Pt to get   
EKG in Dr Rodgers's   
office in 1 week (says   
she has appt  
next week)-->if still in   
AFL, can get DCCV   
locally. Pt was advised   
on  
coumadin/AMio interaction   
and that she would likely   
need less coumadin. Pt  
agrees to get INR on 20.  
  
-MED changes: Cont   
Protonix daily as home,   
Dc dilt, Amio as   
above(called in Rx)  
-post procedure written   
instructions reviewed   
with pt and all questions  
answered  
-Pt was advised on   
importance of adherence   
to Coumadin regimen and   
rec to have  
minimum weekly INR x 4   
weeks then interval as   
determined by Coumadin   
clinic  
-90d intermittent EM   
given to pt by [facility]   
tech who instructed pt on   
use  
-F/u with cards Dr Rodgers 1 month (pt to   
call for appt)  
-F/w Dr Mullins 20 in   
[place] [address] office  
  
GRANT Robins PA-C, [facility]  
Cardiac Electrophysiology  
  
  
  
  
Electronic Signatures for   
Addendum Section:  
Myke Matthews (PAC)   
(Signed Addendum   
2020 16:21)  
Greater than 30 min spent   
on coordination of   
discharge.  
  
Electronic Signatures:  
Myke Matthews (PAC)   
(Signed 2020   
16:21)  
Authored: Service,   
Subjective Data,   
Objective Data,   
Assessment and Plan,  
Signature/Cosignature/Att  
estation  
  
  
Last Updated: 2020   
16:21 by Myke Matthews   
(PAC)               Normal                                  [facility]  
   
                                                    Discharge Planning [id number]  
2020   
   
                                                    Discharge Planning   
Note2                                   Discharge Planning:  
Anticipated Discharge   
[id number]  
Discharge Planning  
20 16:45 Pt given   
discharge instructions.   
Prescription sent to   
pharmacy.  
Pt and pt's daughter   
verbalized understanding   
of discharge   
instructions.  
peripheral IV removed and   
pt removed from tele.   
right groin site   
dry/intact  
with no hematoma. No   
other discharge needs at   
this time- Nieves Trinh RN  
  
Assessment:  
Discharge Planning   
Assessment   
[id number]  
Stated Reason for   
Admissionprocedure (1)  
Arrived Fromhome (1)  
[facility]  
Living   
Arrangementshouse(1)  
Resource/Environmental   
Concernsnone(1)  
Anticipated Transition   
[place](1)  
Services Anticipated at   
Transitionnon(1)  
  
  
  
Electronic Signatures:  
Nieves rTinh (WINSTON)   
(Signed 2020   
16:54)  
Authored: Discharge   
Planning Note2  
  
  
Last Updated: 2020   
16:54 by Nieves Trinh   
(WINSTON)  
  
References:  
1. Data Referenced From   
 Patient Profile - Adult   
v2  2020 08:21 Normal                                  [facility]  
   
                                                    PT/INRon 2020   
   
                                                    INR Coag (PPP)   
[Relative time] 2.5 {INR}       High            0.9 - 1.1       [facility]  
   
                                        Comment on above:   Performed By: #### P  
TINR ####  
[facility]  
[id number]  
[place] [address]   
   
                      PT Coag (PPP) [Time] 28.5 s     High       9.7 - 12.7 [facility]  
   
                                        Comment on above:   Performed By: #### P  
TINR ####  
[facility]  
[address]  
[place] [address]   
   
                                                    ACT-HIGH RANGEon 2020   
   
                      ACT-HIGH RANGE 221 SECONDS High       96 - 152   [facility]  
   
                                        Comment on above:   Result Comment: Note  
 new reference range as of 2019.  
Target ACT range will vary based on the patient population,  
clinical status, and surgical intervention occurring.   
   
                                                            Performed By: #### A  
CTP ####  
[facility]  
[address]  
[place] [address]   
   
                      ACT-HIGH RANGE 352 SECONDS High       96 - 152   [facility]  
   
                                        Comment on above:   Result Comment: Note  
 new reference range as of 2019.  
Target ACT range will vary based on the patient population,  
clinical status, and surgical intervention occurring.   
   
                                                            Performed By: #### A  
CTP ####  
[facility]  
[address]  
[place] [address]   
   
                      ACT-HIGH RANGE 360 SECONDS High       96 - 152   [facility]  
   
                                        Comment on above:   Result Comment: Note  
 new reference range as of 2019.  
Target ACT range will vary based on the patient population,  
clinical status, and surgical intervention occurring.   
   
                                                            Performed By: #### A  
CTP ####  
[facility]  
[address]  
[place] [address]   
   
                      ACT-HIGH RANGE 351 SECONDS High       96 - 152   [facility]  
   
                                        Comment on above:   Result Comment: Note  
 new reference range as of 2019.  
Target ACT range will vary based on the patient population,  
clinical status, and surgical intervention occurring.   
   
                                                            Performed By: #### A  
CTP ####  
[facility]  
[address]  
[place] [address]   
   
                      ACT-HIGH RANGE 325 SECONDS High       96 - 152   [facility]  
   
                                        Comment on above:   Result Comment: Note  
 new reference range as of 2019.  
Target ACT range will vary based on the patient population,  
clinical status, and surgical intervention occurring.   
   
                                                            Performed By: #### A  
CTP ####  
[facility]  
[address]  
[place] [address]   
   
                      ACT-HIGH RANGE 286 SECONDS High       96 - 152   [facility]  
   
                                        Comment on above:   Result Comment: Note  
 new reference range as of 2019.  
Target ACT range will vary based on the patient population,  
clinical status, and surgical intervention occurring.   
   
                                                            Performed By: #### A  
CTP ####  
[facility]  
[address]  
[place] [address]   
   
                      ACT-HIGH RANGE 288 SECONDS High       96 - 152   [facility]  
   
                                        Comment on above:   Result Comment: Note  
 new reference range as of 2019.  
Target ACT range will vary based on the patient population,  
clinical status, and surgical intervention occurring.   
   
                                                            Performed By: #### A  
CTP ####  
[facility]  
[address]  
[place] [address]   
   
                                                    Admission Risk Screen - Adul  
ton 2020   
   
                                                    Admission Risk Screen   
- Adult                                 Allergies:  
Allergies:  
Compazine: Unknown  
  
Patient Verification:  
New W ID Band Applied in   
my Departmentyes  
Patient Identity Verified   
Bypatient  
ID Band FULL Name,   
include Middle, spelling   
matches patient's ID used   
for  
verificationyes  
ID Band  Matches   
Patient ID used for   
Verficationyes  
ID Band MRN Matches EMR   
MRNyes  
  
Advance Directive:  
Advance Directive/DNRyes  
Advance Directive   
typeLiving Will, Durable   
Power of  for   
Healthcare  
Living Will   
AvailabilityLiving Will   
not available now  
Living Will   
[id number]  
Durable Power of    
AvailabilityDPOA not   
available now  
Durable Power of    
[id number]  
Durable Power of    
contact (name and   
number)pt doesn't know  
  
Falls Screen:  
Type of   
Assessmentadmission  
Moderate Risk   
Factorsnursing judgment   
(specify)  
High Risk Factorsnursing   
judgment (specify)  
Risk for Injury   
Associated with   
Fallcoagulation blood   
thinners (Coumadin,  
heparin gtt),   
coagulopathy  
Fall Risk Conclusionhigh   
falls risk with risk for   
associated injury  
Universal Safety   
InterventionsWDL *orient   
to call system *instruct   
to call for  
assistance before getting   
out of bed *non-slip   
footwear when patient is   
out of  
bed *call bell in reach   
*personal items and   
telephone in reach   
*physically safe  
environment (no spills or   
clutter) *bed in lowest   
position with wheels   
locked  
*appropriate side rails   
in place *room/bathroom   
lighting operational,   
light  
cord in reach   
*appropriate signage on   
door  
Fall and Injury Risk   
Interventionssupervised   
toileting (mandatory for   
all high  
risk patients), exit   
(bed/chair) alarms   
(mandatory for all high   
risk patients),  
educate pt/family,   
monitor med side effects,   
consult Pharmacy,   
individualized  
toileting schedule,   
nursing judgement   
(specify), educate   
patient/family for  
risk for injury   
(fractures and bleeding)  
  
  
Family Violence Screen:  
Are you or have you been   
threatened or abused   
physically, emotionally,   
or  
sexually by anyoneno  
Do you feel UNSAFE going   
back to the place where   
you are livingno  
Clinical assessment: Are   
there any apparent signs   
of injuries/behaviors   
that  
could be related to   
abuse/neglectno  
Social Service Consult   
for abuse/neglect needed   
this visitno  
  
Functional Screen:  
Functional Screen: In the   
recent/past 2-4 weeks,   
patient or family have  
noticedno issues that   
require a speech/language   
consult at this time  
  
AM-PAC- Basic   
Mobility/Daily Activity:  
Patient baseline   
bedboundno  
  
Learning Assessment   
(Patient):  
Patient is Able to be   
Assessed for Learningyes  
Factors Influencing   
Readiness to Learnpain  
Factors that Impact   
Ability to Learnnone  
Devices/Methods Used to   
Communicatenone  
Learning   
Preferencesindividual   
instruction; verbal   
instruction  
Cultural   
Considerationsnone  
Developmental   
Considerationsnone  
Islam   
Considerationsnone  
  
Learning Assessment   
(Other Learner):  
Other learner availableno  
  
Suicide/Depression   
Screen:  
During the past month,   
have you often been   
bothered by feeling down,  
depressed or hopelessno  
During the past month,   
have you often had little   
interest or pleasure in  
doing thingsno  
Have you had any thoughts   
of harming yourselfno  
Have you had any thoughts   
of harming anyone elseno  
  
Adult Nutrition Screen:  
Have you recently lost   
weight without tryingno  
Have you been eating   
poorly because of a   
decreased appetiteno  
Malnutrition Screening   
Tool Score0  
Malnutrition Screening   
Tool RiskMST = 0 or 1 Not   
at risk. Eating well with  
little or no weight loss  
Nutrition Consult needed   
this visitno  
Can Patient Participate   
in Room Serviceyes  
Patient requires Paper   
Dishes/Plastic Utensilsno  
  
Pain Screen:  
Pain ScaleCognitively   
Impaired Pain Assessment   
Tool  
Pain Scale   
Educationteaching   
provided  
Current Pain Level0 =   
None  
Acceptable Pain Level0 =   
None  
Expression of Pain   
(nonverbal)none  
Chronic Painyes pt has RA  
  
Spiritual Screen:  
Are there any cultural,   
spiritual, Latter day   
practices/values/needs   
that are  
important for us to   
knowno  
  
CAGE:  
Is this an injured   
patient at a Trauma   
Center   
[place] [place]  
[facility]  
[facility]): no  
  
Vaccinations:  
Vaccination - Influenza   
Vaccination Screen:  
Is it flu season (between   
 and )Yes  
Screening for identified   
contraindications to   
influenza vaccination  
patient/caregiver refusal  
  
Vaccination - Pneumonia   
Vaccination Screen:  
Patient has received a   
previous pneumonia   
vaccine:yes  
  
Nima:  
Skin - Nima Scale:  
  
Nima: Sensory   
Perception (response to   
environment)(4) no   
impairment  
Nima: Moisture (degree   
skin exposed to   
moisture)(4) rarely moist  
Nima: Activity (ability   
to walk)(2) chairfast  
Nima: Mobility   
(amount/control of body   
movement)(3) slightly   
limited  
Nima: Nutrition   
(quality of food   
intake)(3) adequate  
Nima: Friction and   
Shear(3) no apparent   
problem  
Nima: Score19  
  
Significant Indicatiors:  
Significant Indicators:   
Complete  
  
Pressure Injury:  
Pressure Injury Present   
on Admissionno  
  
  
  
Electronic Signatures:  
Nieves Trinh (WINSTON)   
(Signed 2020   
08:20)  
Authored: Admission Risk   
Screens, Vaccinations,   
Nima, Pressure Injury  
  
  
Last Updated: 2020   
08:20 by Nieves Trinh   
(WINSTON)                Normal                                  [facility]  
   
                                                    History and Physicalon    
   
                                        History and Physical History of Present   
Illness:  
HPI:  
Giacomo Berman is a 60 y/o   
female referred by Dr Rodgers for evaluation of   
AF.  
PMH includes HTN, CVA   
( w/ residual visual   
issues), CAD s/p PCI LAD  
(2019), HLD, mild   
obesity, RA,   
rhabdomyolysis and PAF   
(diagnosed 2019).  
Treatment of her AF   
includes AADs (Sotalol &   
Multaq - prolonged QT ,   
DAUGHTER  
NOT QUITE SURE ABOUT   
THAT). Pt is asymptomatic   
while in AF. Pt is   
currently  
managed on Diltiazem and   
Coumadin. Pts INRs are   
managed by [facility]  
Coumadin clinic. She is   
currently having her   
INR's checked monthly.   
Echo  
2019: LV size and   
thickness are normal, EF   
55-60%, LA mildly   
dilated, mild  
MR, & TR WE DISCUSSED THE   
RISKS AND BENEFITS OF   
AADs vs. CATHETER   
ABLATION FOR  
PERSISTENT AF. MRs. BERMAN   
AND HER DAUGHTER   
UNDERSTAND AND WANT TO   
PROCEED WITH  
ABLATION.  
  
  
Comorbidities:  
Comorbid Conditionsatrial   
fibrillation  
Type of Atrial   
FibrillationPermanent   
(chronic)  
  
Family History:  
Family History: reviewed   
and not pertinent to   
presenting problem  
  
Social History:  
Social History:  
Smoking Statusnever   
smoker  
  
  
  
Allergies:  
Compazine: Unknown  
  
Medications Prior to   
Admission:  
Outpatient Meds have not   
been reviewed.  
  
Review of Systems:  
Constitutional: NEGATIVE:   
Fever, Chills, Anorexia,   
Weight Loss, Malaise  
  
Eyes: NEGATIVE: Blurry   
Vision, Drainage,   
Diploplia, Redness,   
Vision Loss/  
Change  
  
ENMT: NEGATIVE: Nasal   
Discharge, Nasal   
Congestion, Ear Pain,   
Mouth Pain, Throat  
Pain  
  
Respiratory: NEGATIVE:   
Dry Cough, Productive   
Cough, Hemoptysis,   
Wheezing,  
Shortness of Breath  
  
Cardiac: POSITIVE:   
Dyspnea on Exertion;   
NEGATIVE: Chest Pain,   
Orthopnea,  
Palpitations, Syncope  
  
Gastrointestinal:   
NEGATIVE: Nausea,   
Vomiting, Diarrhea,   
Constipation, Abdominal  
Pain  
  
Genitourinary: NEGATIVE:   
Discharge, Dysuria, Flank   
Pain, Frequency,   
Hematuria  
  
Musculoskeletal:   
NEGATIVE: Decreased ROM,   
Pain, Swelling,   
Stiffness, Weakness  
  
Endocrine: NEGATIVE: Heat   
Intolerance, Cold   
Intolerance, Sweat,   
Polyuria,  
Thirst  
  
Hematologic/Lymph:   
NEGATIVE: Anemia,   
Bruising, Easy Bleeding,   
Night Sweats,  
Petechiae  
  
Allergic/Immunologic:   
NEGATIVE: Anaphylaxis,   
Itchy/ Teary Eyes,   
Itching,  
Sneezing, Swelling  
  
  
Objective:  
Physical Exam:  
  
Constitutional: Awake,   
alert, oriented x3  
Head/Neck: No JVD  
Respiratory/Thorax: CTA   
bilaterally  
Cardiovascular: RRR.  
Gastrointestinal: Soft.   
Nontender. No guarding or   
rebound.  
Musculoskeletal: Moves   
all extremities   
spontaneously.  
Extremities: Warm. No   
edema  
Neurological: Alert and   
oriented x 3.  
  
Assessment and Plan:  
Assessment:  
Giacomo Berman is a 60 y/o   
female referred by Dr Rodgers for evaluation of   
AF.  
PMH includes HTN, CVA   
( w/ residual visual   
issues), CAD s/p PCI LAD  
(2019), HLD, mild   
obesity, RA,   
rhabdomyolysis and PAF   
(diagnosed 2019).  
Treatment of her AF   
includes AADs (Sotalol &   
Multaq - prolonged QT ,   
DAUGHTER  
NOT QUITE SURE ABOUT   
THAT). Pt is asymptomatic   
while in AF. Pt is   
currently  
managed on Diltiazem and   
Coumadin. Pts INRs are   
managed by [facility]  
Coumadin clinic. She is   
currently having her   
INR's checked monthly.   
Echo  
2019: LV size and   
thickness are normal, EF   
55-60%, LA mildly   
dilated, mild  
MR, & TR WE DISCUSSED THE   
RISKS AND BENEFITS OF   
AADs vs. CATHETER   
ABLATION FOR  
PERSISTENT AF. MRs. BERMAN   
AND HER DAUGHTER   
UNDERSTAND AND WANT TO   
PROCEED WITH  
ABLATION.  
  
  
Signatures/Attestation/Ce  
rtification:  
Note Completion:  
I am a: Resident/Fellow  
Attending AttestationI   
saw and evaluated the   
patient. I personally   
obtained  
the key and critical   
portions of the history   
and physical exam or was  
physically present for   
key and critical portions   
performed by the  
resident/fellow. I   
reviewed the   
resident/fellows   
documentation and   
discussed  
the patient with the   
resident/fellow. I agree   
with the resident/fellows  
medical decision making   
as documented in the   
note.  
I personally evaluated   
the patient [id number]  
Attending Provider   
Inpatient Certification   
StatementI certify this   
patients  
need for inpatient care   
based on the above   
documentation including;   
the order  
to admit as inpatient,   
the anticipated length of   
stay, diagnosis, problem   
list  
and plan of care, and   
discharge plan.  
  
  
  
Electronic Signatures:  
Rich Mullins)   
(Signed 12-Mar-2020   
07:48)  
Authored: Medications   
Prior to Admission,   
Signatures/Attestation/Ce  
rtification  
Co-Signer: History of   
Present Illness,   
Comorbidities, Family   
History, Social  
History, Allergies,   
Medications Prior to   
Admission, Review of   
Systems,  
Objective, Assessment and   
Plan,   
Signatures/Attestation/Ce  
rtification  
Juan Diego Ty (Fellow))   
(Signed 2020   
07:56)  
Authored: History of   
Present Illness,   
Comorbidities, Family   
History, Social  
History, Allergies,   
Medications Prior to   
Admission, Review of   
Systems,  
Objective, Assessment and   
Plan,   
Signatures/Attestation/Ce  
rtification  
  
  
Last Updated: 12-Mar-2020   
07:48 by Rich Mullins)                [facility]  
   
                                                    Patient Profile - Adult v2on  
 2020   
   
                                                    Patient Profile -   
Adult v2                                Profile:  
Initial Info:  
How to be AddressedDonna  
Spoken Language   
PreferredEnglish  
Source of   
Informationpatient  
Are you currently using   
the Personal Electronic   
Health Record or   
Fle[facility]airCAREno  
Are you interested in   
learning more about   
[facility] for the   
management of your  
healthnot at this time  
Stated Reason for   
Admissionprocedure  
Wants Family/Rep Notified   
of Admissionno  
Notify PCPdo not notify   
PCP  
Informed of Patient   
Visiting Rightsyes  
[facility]  
Patient Belongingsremains   
with patient  
Patient Belongings   
Remaining with   
Patientclothing  
Medications Brought to   
Hospitalno  
  
General Health:  
Weight in kg86.1   
kilogram(s)  
Weight in iup699 pound(s)  
Height in feet5 feet  
Height in inches5   
inch(es)  
Height in cm165.1   
centimeter(s)  
BMI (kg/m2)31.587 square   
meter  
Weight Methodstated  
Scale Typebed  
Height Methodstated  
  
[facility] Based Care:  
How would you like to   
participate in your   
carept denies  
What is the number one   
concern for you during   
this hospitalizationpt   
denies  
What is the most   
important thing we can do   
to support you during   
this  
hospitalizationpt denies  
Is there anything we need   
to know to best care for   
youpt denies  
  
Substance:  
Current or Former   
Substance Use never:   
Cigarette/Tobacco,   
e-Cigarette/Vaping,  
Alcohol, Street Drugs  
  
  
Health Mgmt:  
Symptoms/Conditions   
Managed at   
Homecardiovascular; none  
Cardiovascular   
Managementmanaged  
Barriers to Managing   
Healthnone  
  
Relationship/Environ:  
Primary Source of   
Support/Comfortchild(clint)  
Lives Withalone  
Living Arrangementshouse  
Resource/Environmental   
Concernsnone  
[facility]   
[facility]  
Services Anticipated at   
Transitionnone  
Significant   
IndicatorsComplete  
  
  
  
Information Review:  
Allergies, Home Meds and   
Significant Events have   
been Reviewed and   
Verified  
with Patient/Familyyes  
  
ALLERGY, INTOLERANCE,   
ADVERSE EVENT:  
Allergies:  
Compazine: Drug, Unknown,   
Active  
  
  
Electronic Signatures:  
Nieves Trinh (WINSTON)   
(Signed 2020   
08:24)  
Authored: Profile,   
Additional Information  
  
  
Last Updated: 2020   
08:24 by Nieves Trinh   
(WINSTON)                Normal                                  [facility]  
  
  
  
Vital Signs  
  
  
                          Date Time    Vital Sign   Value        Performing   
Clinician                               Facility  
   
                                                    2024   
13:          Body height         158.75 cm           DO Cesario Piedra  
Work Phone:   
[phone]                          [facility]  
   
                                                    2024   
13:                              Body mass index   
(BMI) [Ratio]             20.5 kg/m2                DO Cesario Piedra  
Work Phone:   
[phone]                          [facility]  
   
                                                    2024   
13:          Body weight         51.7 kg             DO Cesario Piedra  
Work Phone:   
[phone]                          [facility]  
   
                                                    2024   
13:                              Diastolic blood   
pressure                  70 mm[Hg]                 DO Cesario Piedra  
Work Phone:   
[phone]                          [facility]  
   
                                                    2024   
13:          Heart rate          61 /min             DO Cesario Piedra  
Work Phone:   
[phone]                          [facility]  
   
                                                    2024   
13:                              SaO2% (BldA) [Mass   
fraction]                 98 %                      DO Cesario Dorothea  
Work Phone:   
[phone]                          [facility]  
   
                                                    2024   
13:                              Systolic blood   
pressure                  120 mm[Hg]                DO Cesario Dorothea  
Work Phone:   
[phone]                          [facility]  
   
                                                    2024   
15:          Body height         158.75 cm           DO Cesario Dorothea  
Work Phone:   
[phone] [place] [address]  
   
                                                    2024   
15:                              Body mass index   
(BMI) [Ratio]             20.3 kg/m2                DO Cesario Dorothea  
Work Phone:   
[phone]                          [facility]  
   
                                                    2024   
15:          Body weight         51.25 kg            DO Cesario Dorothea  
Work Phone:   
[phone]                          [facility]  
   
                                                    2024   
15:                              Diastolic blood   
pressure                  84 mm[Hg]                 DO Cesario Dorothea  
Work Phone:   
[phone]                          [facility]  
   
                                                    2024   
15:          Heart rate          81 /min             DO Cesario Dorothea  
Work Phone:   
[phone]                          [facility]  
   
                                                    2024   
15:          Respiratory rate    14 /min             DO Cesario Dorothea  
Work Phone:   
[phone]                          [facility]  
   
                                                    2024   
15:                              SaO2% (BldA) [Mass   
fraction]                 99 %                      DO Cesario Dorothea  
Work Phone:   
[phone]                          [facility]  
   
                                                    2024   
15:                              Systolic blood   
pressure                  129 mm[Hg]                DO Cesario Dorothea  
Work Phone:   
[phone]                          [facility]  
   
                                                    2024   
12:          Body height         158.75 cm           DO Cesario Dorothea  
Work Phone:   
[phone]                          [facility]  
   
                                                    2024   
12:          Body weight         51.71 kg            DO Cesario Piedra  
Work Phone:   
[phone]                          [facility]  
   
                                                    2024   
15:                              Body mass index   
(BMI) [Ratio]             20.01 kg/m2               Saturnino Butterfield MD  
Work Phone:   
[phone]                          [facility]  
   
                                                    2024   
15:          Body temperature    97.81 [degF]        Saturnino Butterfield MD  
Work Phone:   
[phone]                          [facility]  
   
                                                    2024   
15:          Body weight         52.89 kg            Saturnino Butterfield MD  
Work Phone:   
[phone]                          [facility]  
   
                                                    2024   
15:                              Diastolic blood   
pressure                  76 mm[Hg]                 Saturnino Butterfield MD  
Work Phone:   
[phone]                          [facility]  
   
                                                    2024   
15:          Heart rate          72 /min             Saturnino Butterfield MD  
Work Phone:   
[phone]                          [facility]  
   
                                                    2024   
15:          Respiratory rate    18 /min             Saturnino Butterfield MD  
Work Phone:   
[phone]                          [facility]  
   
                                                    2024   
15:                              SaO2% (BldA) [Mass   
fraction]                 96 %                      Saturnino Butterfield MD  
Work Phone:   
[phone]                          [facility]  
   
                                                    2024   
15:                              Systolic blood   
pressure                  120 mm[Hg]                Saturnino Butterfield MD  
Work Phone:   
[phone]                          [facility]  
   
                                                    2024   
15:                              Body mass index   
(BMI) [Ratio]             19.87 kg/m2               Saturnino Butterfield MD  
Work Phone:   
[phone]                          [facility]  
   
                                                    2024   
15:          Body temperature    97.59 [degF]        Saturnino Butterfield MD  
Work Phone:   
[phone]                          [facility]  
   
                                                    2024   
15:          Body weight         52.5 kg             Saturnino Butterfield MD  
Work Phone:   
[phone]                          [facility]  
   
                                                    2024   
15:                              Diastolic blood   
pressure                  81 mm[Hg]                 Saturnino Butterfield MD  
Work Phone:   
[phone]                          [facility]  
   
                                                    2024   
15:          Heart rate          77 /min             Saturnino Butterfield MD  
Work Phone:   
[phone]                          [facility]  
   
                                                    2024   
15:          Respiratory rate    16 /min             Saturnino Butterfield MD  
Work Phone:   
[phone]                          [facility]  
   
                                                    2024   
15:                              SaO2% (BldA) [Mass   
fraction]                 97 %                      Saturnino Butterfield MD  
Work Phone:   
[phone]                          [facility]  
   
                                                    2024   
15:                              Systolic blood   
pressure                  138 mm[Hg]                Saturnino Butterfield MD  
Work Phone:   
[phone]                          [facility]  
   
                                                    2024   
13:          Body height         162.56 cm           [facility]  
   
                                                    2024   
13:                              Body mass index   
(BMI) [Ratio]             19.3 kg/m2                [facility]  
   
                                                    2024   
13:          Body weight         51.25 kg            [facility]  
   
                                                    2024   
13:                              Diastolic blood   
pressure                  72 mm[Hg]                 [facility]  
   
                                                    2024   
13:          Heart rate          77 /min             [facility]  
   
                                                    2024   
13:                              SaO2% (BldA) [Mass   
fraction]                 95 %                      [facility]  
   
                                                    2024   
13:                              Systolic blood   
pressure                  120 mm[Hg]                [facility]  
   
                                                    2024   
13:          Body height         162.6 cm            Lissette Jennings MD  
Work Phone:   
[phone]                          [facility]  
   
                                                    2024   
13:                              Body mass index   
(BMI) [Ratio]             20.25 kg/m2               Lissette Jennings MD  
Work Phone:   
[phone]                          [facility]  
   
                                                    2024   
13:          Body weight         53.52 kg            Lissette Jennings MD  
Work Phone:   
[phone]                          [facility]  
   
                                                    2024   
13:                              Diastolic blood   
pressure                  70 mm[Hg]                 Lissette Jennings MD  
Work Phone:   
[phone]                          [facility]  
   
                                                    2024   
13:          Heart rate          71 /min             Lissette Jennings MD  
Work Phone:   
[phone]                          [facility]  
   
                                                    2024   
13:                              SaO2% (BldA) [Mass   
fraction]                 96 %                      Lissette Jennings MD  
Work Phone:   
[phone]                          [facility]  
   
                                                    2024   
13:                              Systolic blood   
pressure                  90 mm[Hg]                 Lissette Jennings MD  
Work Phone:   
[phone]                          [facility]  
   
                                                    2024   
11:          Body height         162.56 cm           [facility]  
   
                                                    2024   
11:                              Body mass index   
(BMI) [Ratio]             20.4 kg/m2                [facility]  
   
                                                    2024   
11:          Body weight         53.97 kg            [facility]  
   
                                                    2024   
11:                              Diastolic blood   
pressure                  80 mm[Hg]                 [facility]  
   
                                                    2024   
11:          Heart rate          72 /min             [facility]  
   
                                                    2024   
11:                              SaO2% (BldA) [Mass   
fraction]                 95 %                      [facility]  
   
                                                    2024   
11:                              Systolic blood   
pressure                  110 mm[Hg]                [facility]  
   
                                                    2023   
14:          Body height         163.83 cm           Cesario Piedra  
Other Phone:   
[phone]                           [facility]   
[employer]   
[employer]  
Other Phone:   
[phone]  
   
                                                    2023   
14:                              Body mass index   
(BMI) [Ratio]             23.32 kg/m2               Cesario Piedra  
Other Phone:   
[phone]                           [facility]   
[employer]   
[employer]  
Other Phone:   
[phone]  
   
                                                    2023   
14:          Body temperature    98.2 [degF]         Cesario Piedra  
Other Phone:   
[phone]                           [facility]   
[employer]   
[employer]  
Other Phone:   
[phone]  
   
                                                    2023   
14:          Body weight         62.6 kg             Cesario Piedra  
Other Phone:   
[phone]                           [facility]   
[employer]   
[employer]  
Other Phone:   
[phone]  
   
                                                    2023   
14:                              Diastolic blood   
pressure                  60 mm[Hg]                 Cesario Piedra  
Other Phone:   
[phone]                           [facility]   
[employer]   
[employer]  
Other Phone:   
[phone]  
   
                                                    2023   
14:                              SaO2% (BldA) [Mass   
fraction]                 97 %                      Cesariosendy Piedra  
Other Phone:   
[phone]                           [facility]   
[employer]   
[employer]  
Other Phone:   
[phone]  
   
                                                    2023   
14:                              Systolic blood   
pressure                  90 mm[Hg]                 Cesario Piedra  
Other Phone:   
[phone]                           [facility]   
[employer]   
[employer]  
Other Phone:   
[phone]  
   
                                                    2023   
15:          Body height         163.83 cm           Cesario Piedra  
Other Phone:   
[phone]                           [facility]   
[employer]   
[employer]  
Other Phone:   
[phone]  
   
                                                    2023   
15:                              Body mass index   
(BMI) [Ratio]             26.53 kg/m2               Cesario Piedra  
Other Phone:   
[phone]                           [facility]   
[employer]   
[employer]  
Other Phone:   
[phone]  
   
                                                    2023   
15:          Body temperature    97.7 [degF]         Cesario Piedra  
Other Phone:   
[phone]                           [facility]   
[employer]   
[employer]  
Other Phone:   
[phone]  
   
                                                    2023   
15:          Body weight         71.22 kg            Cesario Piedra  
Other Phone:   
[phone]                           [facility]   
[employer]   
[employer]  
Other Phone:   
[phone]  
   
                                                    2023   
15:                              Diastolic blood   
pressure                  60 mm[Hg]                 Cesario Piedra  
Other Phone:   
[phone]                           [facility]   
[employer]   
[employer]  
Other Phone:   
[phone]  
   
                                                    2023   
15:          Respiratory rate    18 /min             Cesario Dorothea  
Other Phone:   
[phone]                           [facility]   
[employer]   
[employer]  
Other Phone:   
[phone]  
   
                                                    2023   
15:                              SaO2% (BldA) [Mass   
fraction]                 97 %                      Cesario Piedra  
Other Phone:   
[phone]                           [facility]   
[employer]   
[employer]  
Other Phone:   
[phone]  
   
                                                    2023   
15:                              Systolic blood   
pressure                  96 mm[Hg]                 Cesario Piedra  
Other Phone:   
[phone]                           [facility]   
[employer]   
[employer]  
Other Phone:   
[phone]  
   
                                                    05-   
16:          Body height         163.83 cm           Cesario Piedra  
Other Phone:   
[phone]                           [facility]   
[employer]   
[employer]  
Other Phone:   
[phone]  
   
                                                    05-   
16:                              Body mass index   
(BMI) [Ratio]             26.26 kg/m2               Cesario Piedra  
Other Phone:   
[phone]                           [facility]   
[employer]   
[employer]  
Other Phone:   
[phone]  
   
                                                    05-   
16:          Body temperature    97.4 [degF]         Cesario Piedra  
Other Phone:   
[phone]                           [facility]   
[employer]   
[employer]  
Other Phone:   
[phone]  
   
                                                    05-   
16:          Body weight         70.49 kg            Cesario Piedra  
Other Phone:   
[phone]                           [facility]   
[employer]   
[employer]  
Other Phone:   
[phone]  
   
                                                    05-   
16:                              Diastolic blood   
pressure                  74 mm[Hg]                 Cesario Piedra  
Other Phone:   
[phone]                           [facility]   
[employer]   
[employer]  
Other Phone:   
[phone]  
   
                                                    05-   
16:          Respiratory rate    18 /min             Cesario Piedra  
Other Phone:   
[phone]                           [facility]   
[employer]   
[employer]  
Other Phone:   
[phone]  
   
                                                    05-   
16:                              SaO2% (BldA) [Mass   
fraction]                 95 %                      Cesario Piedra  
Other Phone:   
[phone]                           [facility]   
[employer]   
[employer]  
Other Phone:   
[phone]  
   
                                                    05-   
16:                              Systolic blood   
pressure                  104 mm[Hg]                Cesario Dorothea  
Other Phone:   
[phone]                           [facility]   
[employer]   
[employer]  
Other Phone:   
[phone]  
   
                                                    2022   
16:          Body height         163.83 cm           Pierce Wallace  
Other Phone:   
[phone]                           [facility]   
[employer]   
[employer]  
Other Phone:   
[phone]  
   
                                                    2022   
16:                              Body mass index   
(BMI) [Ratio]             30.37 kg/m2               Pierce Delks  
Other Phone:   
[phone]                           [facility]   
[employer]   
[employer]  
Other Phone:   
[phone]  
   
                                                    2022   
16:          Body temperature    98.1 [degF]         Pierce Wallace  
Other Phone:   
[phone]                           [facility]   
[employer]   
[employer]  
Other Phone:   
[phone]  
   
                                                    2022   
16:          Body weight         81.51 kg            Pierce Wallace  
Other Phone:   
[phone]                           [facility]   
[employer]   
[employer]  
Other Phone:   
[phone]  
   
                                                    2022   
16:                              Diastolic blood   
pressure                  84 mm[Hg]                 Pierce Wallace  
Other Phone:   
[phone]                           [facility]   
[employer]   
[employer]  
Other Phone:   
[phone]  
   
                                                    2022   
16:          Respiratory rate    18 /min             Pierce Delks  
Other Phone:   
[phone]                           [facility]   
[employer]   
[employer]  
Other Phone:   
[phone]  
   
                                                    2022   
16:                              SaO2% (BldA) [Mass   
fraction]                 98 %                      Pierce Binks  
Other Phone:   
[phone]                           [facility]   
[employer]  
Other Phone:   
[phone]  
   
                                                    2022   
16:                              Systolic blood   
pressure                  122 mm[Hg]                Pierce Binks  
Other Phone:   
[phone]                           [facility]   
[employer]   
[employer]  
Other Phone:   
[phone]  
   
                                                    2022   
16:          Body height         163.83 cm           Cesario Piedra  
Other Phone:   
[phone]                           [facility]   
[employer]   
[employer]  
Other Phone:   
[phone]  
   
                                                    2022   
16:                              Body mass index   
(BMI) [Ratio]             31.21 kg/m2               Cesario Piedra  
Other Phone:   
[phone]                           [facility]   
[employer]   
[employer]  
Other Phone:   
[phone]  
   
                                                    2022   
16:          Body weight         83.78 kg            Cesario Piedra  
Other Phone:   
[phone]                           [facility]   
[employer]   
[employer]  
Other Phone:   
[phone]  
   
                                                    2022   
16:                              Diastolic blood   
pressure                  84 mm[Hg]                 Cesario Piedra  
Other Phone:   
[phone]                           [facility]   
[employer]   
[employer]  
Other Phone:   
[phone]  
   
                                                    2022   
16:          Respiratory rate    18 /min             Cesario Piedra  
Other Phone:   
[phone]                           [facility]   
[employer]   
[employer]  
Other Phone:   
[phone]  
   
                                                    2022   
16:                              SaO2% (BldA) [Mass   
fraction]                 97 %                      Cesario Piedra  
Other Phone:   
[phone]                           [facility]   
[employer]   
[employer]  
Other Phone:   
[phone]  
   
                                                    2022   
16:                              Systolic blood   
pressure                  104 mm[Hg]                Cesario Piedra  
Other Phone:   
[phone]                           [facility]   
[employer]   
[employer]  
Other Phone:   
[phone]  
   
                                                    2022   
16:          Body height         163.83 cm           Cesario Piedra  
Other Phone:   
[phone]                           [facility]   
[employer]   
[employer]  
Other Phone:   
[phone]  
   
                                                    2022   
16:                              Body mass index   
(BMI) [Ratio]             30.47 kg/m2               Cesario Piedra  
Other Phone:   
[phone]                           [facility]   
[employer]   
[employer]  
Other Phone:   
[phone]  
   
                                                    2022   
16:          Body weight         81.78 kg            Cesario Piedra  
Other Phone:   
[phone]                           [facility]   
[employer]   
[employer]  
Other Phone:   
[phone]  
   
                                                    2022   
16:                              Diastolic blood   
pressure                  60 mm[Hg]                 Cesario Dorothea  
Other Phone:   
[phone]                           [facility]   
[facility]  
Other Phone:   
[phone]  
   
                                                    2022   
16:          Respiratory rate    18 /min             Cesario Dorothea  
Other Phone:   
[phone]                           [facility]   
[facility]  
Other Phone:   
[phone]  
   
                                                    2022   
16:                              SaO2% (BldA) [Mass   
fraction]                 95 %                      Cesario Dorothea  
Other Phone:   
[phone]                           [facility]   
[facility]  
Other Phone:   
[phone]  
   
                                                    2022   
16:                              Systolic blood   
pressure                  120 mm[Hg]                Cesario Piedra  
Other Phone:   
[phone]                           [facility]   
[facility]  
Other Phone:   
[phone]  
   
                                                    2022   
11:                              Diastolic blood   
pressure                  60 mm[Hg]                 Cesario Piedra  
Work Phone:   
[phone]                          [facility]   
[address]   
DO  
Work Phone:   
[phone]  
   
                                                    2022   
11:                              Systolic blood   
pressure                  88 mm[Hg]                 Cesario Piedra  
Work Phone:   
[phone]                          [facility]   
[address]   
DO  
Work Phone:   
[phone]  
   
                                                    2022   
11:          Body height         162.56 cm           Cesario Piedra  
Work Phone:   
[phone]                          [facility]   
[address]   
DO  
Work Phone:   
[phone]  
   
                                                    2022   
11:                              Body mass index   
(BMI) [Ratio]             30.9 kg/m2                Cesario Piedra  
Work Phone:   
[phone]                          [facility]   
[address]   
DO  
Work Phone:   
[phone]  
   
                                                    2022   
11:                              Body surface area   
Derived from   
formula                   1.87 m2                   Cesario Piedra  
Work Phone:   
[phone]                          [facility]   
[address]   
DO  
Work Phone:   
[phone]  
   
                                                    2022   
11:          Body weight         81.65 kg            Cesario Piedra  
Work Phone:   
[phone]                          [facility]   
[address]   
DO  
Work Phone:   
[phone]  
   
                                                    2022   
11:          Heart rate          73 /min             Cesario KATHY Dorothea  
Work Phone:   
[phone]                          [facility]   
[address]   
DO  
Work Phone:   
[phone]  
   
                                                    2022   
10:          Body height         162.56 cm           Cesario KATHY PinkDorothea  
Work Phone:   
[phone]                          [facility]   
[address]   
DO  
Work Phone:   
[phone]  
   
                                                    2022   
10:                              Body mass index   
(BMI) [Ratio]             30.9 kg/m2                Cesario KATHY PinkDorothea  
Work Phone:   
[phone]                          [facility]   
[address]   
DO  
Work Phone:   
[phone]  
   
                                                    2022   
10:                              Body surface area   
Derived from   
formula                   1.87 m2                   Cesario KATHY Dorothea  
Work Phone:   
[phone]                          [facility]   
[address]   
DO  
Work Phone:   
[phone]  
   
                                                    2022   
10:          Body weight         81.65 kg            Cesario KATHY PinkDorothea  
Work Phone:   
[phone]                          [facility] [facility]   
[facility]   
DO  
Work Phone:   
[phone]  
   
                                                    2022   
10:                              Diastolic blood   
pressure                  72 mm[Hg]                 Cesario KATHY Dorothea  
Work Phone:   
[phone]                          [facility]   
[facility]   
DO  
Work Phone:   
[phone]  
   
                                                    2022   
10:          Heart rate          67 /min             Cesario KATHY PinkDorothea  
Work Phone:   
[phone]                          [facility]   
[facility]   
DO  
Work Phone:   
[phone]  
   
                                                    2022   
10:                              Systolic blood   
pressure                  98 mm[Hg]                 Cesario Piedra  
Work Phone:   
[phone]                          [facility]   
[facility]   
DO  
Work Phone:   
[phone]  
   
                                                    2022   
00:                              40 1                Cesario Piedra  
Work Phone:   
[phone]                          [facility]   
[facility]   
DO  
Work Phone:   
[phone]  
   
                                                    Comment on   
above:                                  [id number]   
   
                                                    2022   
14:                              Diastolic blood   
pressure                  78 mm[Hg]                 Cesario Piedra  
Work Phone:   
[phone]                          [facility]   
[facility]   
DO  
Work Phone:   
[phone]  
   
                                                    2022   
14:                              Systolic blood   
pressure                  120 mm[Hg]                Cesario Piedra  
Work Phone:   
[phone]                          [facility]   
[facility]   
DO  
Work Phone:   
[phone]  
   
                                                    2022   
14:          Body height         162.56 cm           Cesario Piedra  
Work Phone:   
[phone]                          [facility]   
[facility]   
DO  
Work Phone:   
[phone]  
   
                                                    2022   
14:                              Body mass index   
(BMI) [Ratio]             31.58 kg/m2               Cesario Piedra  
Work Phone:   
[phone]                          [facility]   
[facility]   
DO  
Work Phone:   
[phone]  
   
                                                    2022   
14:                              Body surface area   
Derived from   
formula                   1.89 m2                   Cesario Piedra  
Work Phone:   
[phone]                          [facility]   
[facility]   
DO  
Work Phone:   
[phone]  
   
                                                    2022   
14:          Body weight         83.46 kg            Cesario Piedra  
Work Phone:   
[phone]                          [facility]   
[facility]   
DO  
Work Phone:   
[phone]  
   
                                                    2022   
14:                              Diastolic blood   
pressure                  80 mm[Hg]                 Cesario Piedra  
Work Phone:   
[phone]                          [facility]   
[facility]   
DO  
Work Phone:   
[phone]  
   
                                                    2022   
14:          Heart rate          56 /min             Cesario Piedra  
Work Phone:   
[phone]                          [facility]   
[facility]   
DO  
Work Phone:   
[phone]  
   
                                                    2022   
14:                              Systolic blood   
pressure                  126 mm[Hg]                Cesario Piedra  
Work Phone:   
[phone]                          [facility]   
[facility]   
DO  
Work Phone:   
[phone]  
   
                                                    2022   
10:                              75.8 1              Cesario KATHY PinkDorothea  
Work Phone:   
[phone]                          [facility]   
[facility]   
DO  
Work Phone:   
[phone]  
   
                                                    Comment on   
above:                                  [facility]   
   
                                                    02-   
11:                              Diastolic blood   
pressure                  80 mm[Hg]                 Cesario Piedra  
Work Phone:   
[phone]                          [facility]   
[facility]   
DO  
Work Phone:   
[phone]  
   
                                                    02-   
11:                              Systolic blood   
pressure                  142 mm[Hg]                Cesario Piedra  
Work Phone:   
[phone]                          [facility]   
[facility]   
DO  
Work Phone:   
[phone]  
   
                                                    02-   
11:                              Diastolic blood   
pressure                  71 mm[Hg]                 Cesario Piedra  
Work Phone:   
[phone]                          [facility]   
[facility]   
DO  
Work Phone:   
[phone]  
   
                                                    02-   
11:                              Systolic blood   
pressure                  178 mm[Hg]                Cesario Piedra  
Work Phone:   
[phone]                          [facility]   
[address]   
DO  
Work Phone:   
[phone]  
   
                                                    02-   
11:          Body height         157.48 cm           Cesario Piedra  
Work Phone:   
[phone]                          [facility]   
[facility]   
DO  
Work Phone:   
[phone]  
   
                                                    02-   
11:                              Body mass index   
(BMI) [Ratio]             34.75 kg/m2               Cesario Piedra  
Work Phone:   
[phone]                          [facility]   
[address]   
DO  
Work Phone:   
[phone]  
   
                                                    02-   
11:                              Body surface area   
Derived from   
formula                   1.87 m2                   Cesario Piedra  
Work Phone:   
[phone]                          [facility]   
[address]   
DO  
Work Phone:   
[phone]  
   
                                                    02-   
11:          Body weight         86.18 kg            Cesario Piedra  
Work Phone:   
[phone]                          [facility]   
[address]   
DO  
Work Phone:   
[phone]  
   
                                                    02-   
11:                              Diastolic blood   
pressure                  99 mm[Hg]                 Cesario Piedra  
Work Phone:   
[phone]                          [facility]   
[address]   
DO  
Work Phone:   
[phone]  
   
                                                    02-   
11:          Heart rate          163 /min            Cesario Piedra  
Work Phone:   
[phone]                          [facility]   
[email or web address]   
DO  
Work Phone:   
[phone]  
   
                                                    02-   
11:                              Systolic blood   
pressure                  178 mm[Hg]                Cesario Piedra  
Work Phone:   
[phone]                          [facility]   
[address]   
DO  
Work Phone:   
[phone]  
   
                                                    11-   
14:          Body height         163.83 cm           Cesario Peidra  
Other Phone:   
[phone]                           [employer] [facility]   
Professional   
Corporation  
Other Phone:   
[phone]  
   
                                                    11-   
14:                              Body mass index   
(BMI) [Ratio]             34.62 kg/m2               Cesario Piedra  
Other Phone:   
[phone]                           [facility]   
Professional   
Corporation  
Other Phone:   
[phone]  
   
                                                    11-   
14:          Body temperature    98.8 [degF]         Cesario Piedra  
Other Phone:   
[phone]                           [facility]   
Professional   
Corporation  
Other Phone:   
[phone]  
   
                                                    11-   
14:          Body weight         92.94 kg            Cesario Dorothea  
Other Phone:   
[phone]                           [facility]   
Professional   
Corporation  
Other Phone:   
[phone]  
   
                                                    11-   
14:                              Diastolic blood   
pressure                  88 mm[Hg]                 Cesario Piedra  
Other Phone:   
[phone]                           [facility]   
Professional   
Corporation  
Other Phone:   
[phone]  
   
                                                    11-   
14:          Respiratory rate    18 /min             Cesario Piedra  
Other Phone:   
[phone]                           [facility]   
Professional   
Corporation  
Other Phone:   
[phone]  
   
                                                    11-   
14:                              SaO2% (BldA) [Mass   
fraction]                 94 %                      Cesario Piedra  
Other Phone:   
[phone]                           [facility]   
Professional   
Corporation  
Other Phone:   
[phone]  
   
                                                    11-   
14:                              Systolic blood   
pressure                  132 mm[Hg]                Cesario Piedra  
Other Phone:   
[phone]                           [facility]   
Professional   
Corporation  
Other Phone:   
[phone]  
  
  
  
Encounters  
  
  
                          Encounter Date Encounter Type Care Provider Facility  
   
                                                    Start: 2024  
End: 2024           ambulatory                DO Cesario Piedra  
Work Phone:   
[phone]                          [facility]  
Work Phone:   
[phone]  
   
                                                    Start: 2024  
End: 2024                         Patient encounter   
procedure                               DO Cesario Piedra  
Work Phone:   
[phone]                          Replaced by [facility]   
[facility]   
[place]  
Work Phone:   
[phone]  
   
                                Start: 2024 Registered Recurring DO Maciej Piedra  
Work Phone:   
[phone]                          [facility]  
   
                                Start: 2024 ambulatory      PHYSICIAN NO   
FAMILY                                  Facility:[facility]  
   
                          Start: 2024 ambulatory   [facility]   
Physicians  
   
                                                    Start: 2024  
End: 2024           ambulatory                DO Cesario Piedra  
Work Phone:   
[phone]                          [facility]  
Work Phone:   
[phone]  
   
                                                    Start: 2024  
End: 2024                         Patient encounter   
procedure                               DO Cesario Piedra  
Work Phone:   
[phone]                          Replaced by [facility]   
[facility]   
Gastroenterology  
Work Phone:   
[phone]  
   
                                Start: 2024 Non-patient / Non-visit DO Ashok Piedra  
Work Phone:   
[phone]                          Replaced by [facility]   
[facility]   
Gastroenterology  
Work Phone:   
[phone]  
   
                                Start: 2024 Non-patient / Non-visit DO Mat  
thekrystal PinkDorothea  
Work Phone:   
[phone]                          Replaced by [facility]   
[facility]   
Gastroenterology  
Work Phone:   
[phone]  
   
                                Start: 2024 Non-patient / Non-visit DO Mat  
thekrystal Piedra  
Work Phone:   
[phone]                          Replaced by [facility]   
[facility]   
Gastroenterology  
Work Phone:   
[phone]  
   
                                                    Start: 2024  
End: 2024                         Admission to same day   
surgery center                          DO Cesariosendy Piedra  
Work Phone:   
[phone]                          [facility]   
Health  
Work Phone:   
[phone]  
   
                                                    Start: 2024  
End: 2024     ambulatory          Cesario Piedra    Facility:[facility]  
   
                                                    Start: 2024  
End: 2024           ambulatory                CESARIO PIEDRA                                  Facility:[facility]  
   
                                                    Start: 2024  
End: 2024                         Office outpatient visit   
25 minutes                              Saturnino Butterfield MD  
Work Phone:   
[phone]                          Hematology/Oncology  
   
                                        Comment on above:   Diffuse large B-cell  
 lymphoma of extranodal site excluding   
spleen   
and other solid organs (HCC) (Primary Dx);  
Abnormal weight loss   
   
                                                    Start: 2024  
End: 2024           ambulatory                CESARIO PIEDRA                                  Facility:[facility]  
   
                                Start: 2024 Registered Recurring DO Maciej Piedra  
Work Phone:   
[phone]                          [facility]  
   
                          Start: 2024 Telephone encounter Jen CHAVEZ Hematology/Oncology  
   
                                                    Start: 2024  
End: 2024                         Patient encounter   
procedure                               DO Cesario Piedra  
Work Phone:   
[phone]                          [facility]   
[facility]  
Work Phone:   
[phone]  
   
                                                    Start: 2024  
End: 2024           ambulatory                DO Cesario Piedra  
Work Phone:   
[phone]                          [facility]  
Work Phone:   
[phone]  
   
                                Start: 2024 Registered Recurring DO Maciej Piedra  
Work Phone:   
[phone]                          [facility]  
   
                                                    Start: 2024  
End: 2024                         Office outpatient new 60   
minutes                                 Saturnino Butterfield MD  
Work Phone:   
[phone]                          Hematology/Oncology  
   
                                        Comment on above:   Diffuse large B-cell  
 lymphoma of extranodal site excluding   
spleen   
and other solid organs (HCC) (Primary Dx);  
Abnormal weight loss;  
Diffuse large B-cell lymphoma of solid organ excluding spleen   
(HCC);  
Bipolar II disorder (HCC)   
   
                                Start: 2024 Non-patient / Non-visit DO Ashok mosquera Dorothea  
Work Phone:   
[phone]                          Replaced by [facility]   
[facility]   
[facility]  
Work Phone:   
[phone]  
   
                                                    Start: 2024  
End: 2024           ambulatory                CESARIO PIEDRA                                  Facility:[facility]  
   
                                                    Start: 2024  
End: 2024                         Patient encounter   
procedure                               PHYSICIAN NO   
[facility]  
Work Phone:   
[phone]  
   
                                                    Start: 2024  
End: 2024           ambulatory                PHYSICIAN NO   
[facility]  
Work Phone:   
[phone]  
   
                                Start: 2024 Registered Recurring PHYSICIAN  
 NO   
[facility]  
   
                                                    Start: 2024  
End: 2024           ambulatory                PHYSICIAN NO   
[facility]  
Work Phone:   
[phone]  
   
                                                    Start: 2024  
End: 2024                         Patient encounter   
procedure                               PHYSICIAN NO   
[facility]   
[facility]   
[place]  
Work Phone:   
[phone]  
   
                                Start: 05- Chart abstracting Saturnino paez MD  
Work Phone:   
[phone]                          Hematology/Oncology  
   
                                Start: 2024 Registered Recurring PHYSICIAN  
 NO   
[facility]  
   
                                                    Start: 2024  
End: 2024                         Office outpatient visit   
25 minutes                              Lissette Jennings MD  
Work Phone:   
[phone]                          [facility]   
Cardiology  
   
                                        Comment on above:   Paroxysmal atrial fi  
brillation (CMS-HCC) (Primary Dx);  
Coronary artery disease involving native coronary artery of native   
heart, unspecified whether angina present;  
Primary hypertension;  
Ischemic cardiomyopathy;  
Hyperlipidemia, unspecified hyperlipidemia type   
   
                                Start: 2024 Telephone encounter Brittany snider CMA                                     [facility]   
Cardiology  
   
                                Start: 2024 Non-patient / Non-visit PHYSIC  
LUISITO NO   
[facility]   
[facility]   
Professional Co  
Work Phone:   
[phone]  
   
                                                    Start: 2024  
End: 2024           ambulatory                PHYSICIAN NO   
[facility]  
Work Phone:   
[phone]  
   
                                                    Start: 2024  
End: 2024                         Patient encounter   
procedure                               PHYSICIAN NO   
[facility]   
[facility]   
[place]  
Work Phone:   
[phone]  
   
                          Start: 2024 Refill       [facility]  
 Physicians   
Cardiology  
   
                                        Comment on above:   Med Refill   
   
                                Start: 2023 Registered Recurring PHYSICIAN  
 NO   
[facility]  
   
                                                    Start: 2023  
End: 2023           ambulatory                Cesario Piedra  
Other Phone:   
[phone]                           [facility]   
Corporation  
Other Phone:   
[phone]  
   
                          Start: 2023 Telephone encounter Cesario PYLE  
 Family Medicine   
[place]  
   
                                                    Start: 2023  
End: 2023           ambulatory                Cesario Piedra  
Other Phone:   
[phone]                           [facility]   
Corporation  
Other Phone:   
[phone]  
   
                                        Start: 2023   Office outpatient vi  
sit   
25 minutes                Cesario Piedra            [facility]   
[place]  
   
                                                    Start: 2023  
End: 2023           ambulatory                PHYSICIAN NO   
[facility]  
Work Phone:   
[phone]  
   
                                                    Start: 2023  
End: 2023                         Patient encounter   
procedure                               PHYSICIAN NO   
[facility]   
[facility]  
Work Phone:   
[phone]  
   
                                                    Start: 2023  
End: 2023           ambulatory                Cesario Piedra  
Other Phone:   
[phone]                           [facility]   
Corporation  
Other Phone:   
[phone]  
   
                                        Start: 2023   Office outpatient vi  
sit   
15 minutes                Cesario Piedra            [facility]   
[place]  
   
                                                    Start: 2023  
End: 2023     ambulatory          CESARIO PIEDRA      Facility:[facility]  
   
                                                    Start: 05-  
End: 05-           ambulatory                Cesario Piedra  
Other Phone:   
[phone]                           [facility]   
Corporation  
Other Phone:   
[phone]  
   
                                        Start: 05-   Encounter for genera  
l   
adult medical   
examination without   
abnormal findings         Cesario Piedra            [facility]   
[place]  
   
                                        Start: 05-   Patient encounter   
procedure                 Cesario Piedra            [facility]   
[place]  
   
                                Start: 2023 Registered Recurring PHYSICIAN  
 NO   
[facility]  
   
                                                    Start: 04-  
End: 2023     ambulatory          DR CESARIO JOAQUIN   Facility:[facility]  
   
                                                    Start: 2023  
End: 2023           ambulatory                Cesario Piedra  
Other Phone:   
[phone]                           [facility]   
Corporation  
Other Phone:   
[phone]  
   
                          Start: 2023 Telephone encounter Cesario PYLE  
[facility]   
[place]  
   
                                Start: 2023 Rx Renewal      Cesario Smith  
er  
Work Phone:   
[phone]                          [facility]   
[facility] DO  
Work Phone:   
[phone]  
   
                                                    Start: 2023  
End: 2023           ambulatory                PHYSICIAN NO   
[facility]  
Work Phone:   
[phone]  
   
                                                    Start: 2023  
End: 2023                         Patient encounter   
procedure                               PHYSICIAN NO   
[facility] [address]  
Work Phone:   
[phone]  
   
                                                    Start: 2022  
End: 2022           Departed Referred         PHYSICIAN NO   
[facility]   
[place]  
Work Phone:   
[phone]  
   
                                                    Start: 2022  
End: 2022           ambulatory                Pierce Wallace  
Other Phone:   
[phone]                           [employer]   
[employer]  
Other Phone:   
[phone]  
   
                                        Start: 2022   Office outpatient vi  
sit   
15 minutes                Pierce Rodrigo             [facility]   
[place]  
   
                                                    Start: 2022  
End: 2022           ambulatory                Cesario Piedra  
Other Phone:   
[phone]                           [employer]   
[employer]  
Other Phone:   
[phone]  
   
                          Start: 2022 Telephone encounter Cesario Piedra LASHANDA  
[facility]   
[place]  
   
                                                    Start: 11-  
End: 2022     ambulatory          CESARIO PIEDRA      Facility:H1  
   
                                                    Start: 2022  
End: 2022           ambulatory                Cesario Piedra  
Other Phone:   
[phone]                           [employer]   
[employer]  
Other Phone:   
[phone]  
   
                                        Start: 2022   Office outpatient vi  
sit   
25 minutes                Cesario Piedra            [facility]   
[place]  
   
                                                    Start: 2022  
End: 07-     ambulatory          DR DOCTOR ALMAZAN      Facility:H1  
   
                                                    Start: 2022  
End: 2022     ambulatory          SHAIKH PATRICIA BEE     Facility:H1  
   
                                                    Start: 2022  
End: 2022           ambulatory                Cesario Piedra  
Other Phone:   
[phone]                           [employer]   
[employer]  
Other Phone:   
[phone]  
   
                          Start: 2022 Telephone encounter Cesario Piedra LASHANDA  
[facility]   
[place]  
   
                                Start: 2022 Rx Renewal      Cesario Smith  
er  
Work Phone:   
[phone]                          [facility]   
[facility] [address] DO  
Work Phone:   
[phone]  
   
                                                    Start: 2022  
End: 2022           ambulatory                Cesario Piedra  
Other Phone:   
[phone]                           [employer]   
[employer]  
Other Phone:   
[phone]  
   
                          Start: 2022 Telephone encounter Cesario Pinkmer LASHANDA  
[facility]   
[place]  
   
                                                    Start: 2022  
End: 2022           ambulatory                Cesario Piedra  
Other Phone:   
[phone]                           [employer]   
[employer]  
Other Phone:   
[phone]  
   
                                        Start: 2022   Office outpatient vi  
sit   
15 minutes                Cesaroi Pinkmer            [facility]   
[place]  
   
                                        Start: 2022   Patient encounter   
procedure                 Cesario Piedra            [facility]   
[place]  
   
                                        Start: 2022   EKG, Provider: ANTONIETA SMYTH CARDIOLOGY RN   
1,[id number], Status:   
Pen, Time: 2:00 PM                      Cesario Piedra  
Work Phone:   
[phone]                          [facility]   
[facility] [address] DO  
Work Phone:   
[phone]  
   
                                Start: 2022 Rx Renewal      Cesario Smith  
er  
Work Phone:   
[phone]                          [facility]   
[facility] [address]  
Work Phone:   
[phone]  
   
                                        Start: 2022   Office outpatient vi  
sit   
40 minutes                              Cesario Piedra  
Work Phone:   
[phone]                          [facility]   
[facility] [address] DO  
Work Phone:   
[phone]  
   
                                Start: 2022 Rx Renewal      Cesario Smith  
er  
Work Phone:   
[phone]                          [facility]   
[facility] [address] DO  
Work Phone:   
[phone]  
   
                                        Start: 2022   Patient encounter   
procedure                               Cesario Piedra  
Work Phone:   
[phone]                          [facility]   
[facility] [address]  
Work Phone:   
[phone]  
   
                                        Start: 2022   Office outpatient vi  
sit   
10 minutes                              Cesario Piedra  
Work Phone:   
[phone]                          [facility]   
[facility] [address]  
Work Phone:   
[phone]  
   
                                                    Start: 2022  
End: 2022           ambulatory                Cesario Piedra  
Other Phone:   
[phone]                           [facility] [employer]   
[employer]poration  
Other Phone:   
[phone]  
   
                          Start: 2022 Telephone encounter Cesario PYEL  
[facility] [place]  
   
                                Start: 2022 Rx Renewal      Cesario Smith  
er  
Work Phone:   
[phone]                          [facility]   
[facility] [address] DO  
Work Phone:   
[phone]  
   
                                        Start: 02-   Office outpatient vi  
sit   
25 minutes                              Cesario KATHY PinkDorothea  
Work Phone:   
[phone]                          [facility]   
[facility] [address] DO  
Work Phone:   
[phone]  
   
                                Start: 2022 Rx Renewal      Cesario Smith  
er  
Work Phone:   
[phone]                          [facility]   
[facility] [address] DO  
Work Phone:   
[phone]  
   
                                Start: 2021 Rx Renewal      Cesario Smith  
er  
Work Phone:   
[phone]                          [facility]   
[facility] [address]  
Work Phone:   
[phone]  
   
                                Start: 2021 Rx Renewal      Cesario Smith  
er  
Work Phone:   
[phone]                          [facility]   
[facility] [address] [place]  
Work Phone:   
[phone]  
   
                                                    Start: 11-  
End: 11-           ambulatory                Cesario Piedra  
Other Phone:   
[phone]                           [facility] [employer]   
[employer]  
Other Phone:   
[phone]  
   
                                        Start: 11-   Office outpatient vi  
sit   
25 minutes                Cesario Piedra            [facility]  
  
  
  
Procedures  
  
  
                          Date         Procedure    Procedure Detail Performing   
Clinician  
   
                                                    Start:   
2024          Esophagogastroduodenoscopy                     DO Cesario Thuy denney  
Work Phone:   
[phone]  
   
                                                    Start:   
2024          Dual energy X-ray absorptiometry                     DO Saeed Piedra  
Work Phone:   
[phone]  
   
                                                    Start:   
2024          Lipid 1996 panel - Serum or Plasma                     Saturnino Butterfield MD  
Work Phone:   
[phone]  
   
                                                    Start:   
2024                              Ecg routine ecg w/least 12 lds   
w/i&r                                               Lissette Jennings MD  
Work Phone:   
[phone]  
   
                                                    Start:   
2022          Urine culture                           PHYSICIAN NO   
FAMILY  
   
                                                            History of placement  
 of stent in   
anterior descending branch of left   
coronary artery                                     Cesario Piedra  
Other Phone:   
[phone]  
   
                                                            History of placement  
 of stent in   
anterior descending branch of left   
coronary artery                         Status post   
insertion of   
drug-eluting stent   
into left anterior   
descending (LAD)   
artery                                  PHYSICIAN NO   
FAMILY  
  
  
  
Plan of Treatment  
  
  
                          Date         Care Activity Detail       Author  
   
                          Start: 2029 Lipid panel  Lipid Screening [facility]  
   
                          Start: 2027 Diabetes Screening Diabetes Screenin  
g [facility]  
   
                          Start: 2025 Adult BMI Screening Adult BMI Screen  
ing [facility]  
   
                          Start: 2025 Tobacco Screening Tobacco Screening   
[facility]  
   
                                Start: 2024 Influenza vaccination Influenz  
a Vaccine   
(Season Ended)                          [facility]  
   
                          Start: 2024                           [facility]  
   
                          Start: 2024 Adult BMI Screening Adult BMI Screen  
ing [facility]  
   
                          Start: 2024 Tobacco Screening Tobacco Screening   
[facility]  
   
                                                    Start: 2024  
End: 2024           Follow-up encounter       2024 3:45 PM EDT   
Visit (SP) Office   
Hematology/Oncology   
[address] [place] [address]   
[phone]   
Saturnino Butterfield MD   
[address] [place] [address]   
[phone] (Work)   
[phone] (Fax)   
follow up after PET   
scan to review results                  Hematology/Oncology  
   
                                        Comment on above:   follow up after PET   
scan to review results   
   
                                                    Start: 2024  
End: 2024                         Patient encounter   
procedure                               2024 1:30 PM EDT   
Appointment Radiology   
[place]  [address] [place]   
[address] [phone] PET                  Radiology Pet CT  
   
                                        Comment on above:   PET   
   
                          Start: 2024 Patient referral              [facility]  
Work Phone:   
[phone]  
   
                                                    Start: 05-  
End: 05-           FQ visit new patient    05/15/2024 3:30 PM EDT   
Visit (SP) Office   
Hematology/Oncology   
[address] [place] [address]   
[phone]   
Saturnino Butterfield MD   
[address] [place] [address]   
[phone] (Work)   
[phone] (Fax) New   
Patient                                 Hematology/Oncology  
   
                                        Comment on above:   New Patient   
   
                                                    Start: 2024  
End: 2024                         Patient encounter   
procedure                               2024 1:15 PM EST   
Office Visit [facility]   
[address]   
[place] [address] [phone] Lissette Jennings MD [address]   
[place]   
[address] [phone] (Work)   
[phone] (Fax)                      [facility]   
[facility]  
   
                                        Start: 2024   Behavioral Health   
Screening                               Behavioral Health   
Screening                               [facility]  
   
                                        Start: 2023   COVID-19 Vaccine ( season)                         COVID-19 Vaccine (4 -   
2023-24 season)                         [facility]  
   
                          Start: 2023 Influenza vaccination Influenza Vacc  
ine [facility]  
   
                          Start: 2023 Diabetes Screening Diabetes Screenin  
g [facility]  
   
                                        Start: 2023   Screening mammograph  
y of   
bilateral breasts                       MM screening mammo BI   
w/CAD                                   [facility]  
   
                                        Start: 2022   FUV, Provider:   
Tomi Rodgers, Status:   
Pen, Time: 11:10 AM                     FUV, Provider:   
Tomi Rodgers,   
Status: Pen, Time:   
11:10 AM                                [facility]   
[employer] [facility] [facility] [address]   
DO  
Work Phone:   
[phone]  
   
                                        Start: 2022   ECHO, Provider: GUANAKO MARINO   
[facility] ULTRASOUND   
01,[id number], Status:   
Pen, Time: 1:30 PM                      ECHO, Provider:   
[facility]   
ULTRASOUND   
,[id number], Status:   
Pen, Time: 1:30 PM                      [facility]   
[facility] [address]   
DO  
Work Phone:   
[phone]  
   
                                        Start: 2022   EKG, Provider: ANTONIETA SMYTH CARDIOLOGY RN   
1,[id number], Status:   
Pen, Time: 2:00 PM                      EKG, Provider: ANTONIETA SMYTH CARDIOLOGY   
RN 1,[id number],   
Status: Pen, Time:   
2:00 PM                                 [facility] [address]   
DO  
Work Phone:   
[phone]  
   
                                        Start: 2022   FUV, Provider:   
Tomi Rodgers, Status:   
Pen, Time: 11:10 AM                     FUV, Provider:   
Tomi Rodgers,   
Status: Pen, Time:   
11:10 AM                                [facility]   
DO  
Work Phone:   
[phone]  
   
                                        Start: 2022   NURSEVST, Provider:   
ANTONIETA SMYTH CARDIOLOGY RN   
1,[id number], Status:   
Pen, Time: 2:30 PM                      NURSEVST, Provider:   
ANTONIETA SMYTH   
CARDIOLOGY RN   
1,[id number], Status:   
Pen, Time: 2:30 PM                      [facility] [address]   
DO  
Work Phone:   
[phone]  
   
                                        Start: 2022   FUV, Provider:   
Tomi Rodgers, Status:   
Pen, Time: 1:45 PM                      FUV, Provider:   
Tomi Rodgers,   
Status: Pen, Time:   
1:45 PM                                 [facility] [address]   
[place]  
Work Phone:   
[phone]  
   
                                Start: 2019 Pneumococcal vaccination Pneum  
ococcal Vaccine   
(2 of 2 - PPSV23 or   
PCV20)                                  [facility]  
   
                                        Start: 2019   RSV Vaccine (1 - 1-d  
ose   
60+ series)                             RSV Vaccine (1 -   
1-dose 60+ series)                      [facility]  
   
                                        Start: 2009   Administration of   
varicella zoster vaccine                Zoster (Shingles)   
Vaccine (1 of 2)                        [facility]  
   
                                Start: 2009 Shingrix Vaccine (1 of 2) Shin  
grix Vaccine (1 of   
2)                                      [facility]  
   
                          Start: 2004 Lipid panel  Lipid Screening [facility]  
   
                                        Start: 2004   Screening for malign  
ant   
neoplasm of colon                                   [facility]  
   
                                        Start: 1999   Screening for malign  
ant   
neoplasm of breast        Mammogram Screening       [facility]  
   
                                        Start: 1989   Screening for malign  
ant   
neoplasm of cervix        HPV Testing               [facility]  
   
                                        Start: 1980   Screening for malign  
ant   
neoplasm of cervix                                  [facility]  
   
                                        Start: 1978   DTaP,Tdap and Td Vac  
cines   
(1 - Tdap)                              DTaP,Tdap and Td   
Vaccines (1 - Tdap)                     [facility]  
   
                                        Start: 1978   Urine microalbumin   
profile                                 DTaP,Tdap,Td Vaccine   
(1 - Tdap)                              [facility]  
   
                                Start: 1977 Adult BMI Follow Up Plan Adult  
 BMI Follow Up   
Plan                                    [facility]  
   
                          Start: 1977 HIV screening HIV Screening [facility]  
   
                          Start: 1971 Depression Screening Depression Scre  
ening [facility]  
   
                          Start: 1959 Tobacco Counseling Tobacco Counselin  
g [facility]  
   
                                       Barium swallow              [facility]  
   
                                                            Comprehensive metabo  
lic   
 panel - Serum or   
Plasma                                              [facility]  
   
                                                            Comprehensive metabo  
lic   
 panel - Serum or   
Plasma                                              [facility]  
   
                                                            DXA Skeletal system.  
axial   
Views for bone density                              [facility]  
   
                                                Patient Education Colon polyps   
Hemorrhoids (DC)   
Esophageal Dilation   
Hiatal Hernia (DC)   
Esophageal Stricture   
(DC) Know your Meds                     [facility]  
Work Phone:   
[phone]  
   
                                       Patient referral              [facility]  
Work Phone:   
[phone]  
   
                                                      
End: 2025                         PET+CT Guidance for   
localization of tumor of   
Skull base to mid-thigh--   
W 18F-FDG IV                            NM PET/CT SKULL-THIGH   
INITIAL Radiology   
Routine Diffuse large   
B-cell lymphoma of   
solid organ excluding   
spleen (HCC) 1   
Occurrences starting   
2024 until   
2025                              [facility]  
Work Phone:   
[phone]  
   
                                        Comment on above:   1 Occurrences starti  
ng 2024 until 2025   
   
                                                POCT EKG        POCT EKG ECG Rou  
lasha   
Paroxysmal atrial   
fibrillation (CMS-HCC)   
Coronary artery   
disease involving   
native coronary artery   
of native heart,   
unspecified whether   
angina present   
2024                              [facility]  
Work Phone:   
[phone]  
   
                                                                 [facility]  
  
  
  
Immunizations  
  
  
                      Immunization Date Immunization Notes      Care Provider Messi henry  
   
                                        12-          [employer] COVI  
D-19   
Vacc 30 MCG/0.3ML   
Intramuscular Suspension                            Cesario Piedra  
Work Phone:   
[phone]                          [facility]  
   
                                        2021          [employer] COVI  
D-19   
Vacc 30 MCG/0.3ML   
Intramuscular Suspension                            Cesario Piedra  
Work Phone:   
[phone]                          [facility]  
   
                                        2021          [employer] COVI  
D-19   
Vacc 30 MCG/0.3ML   
Intramuscular Suspension                            Cesario Piedra  
Work Phone:   
[phone]                          [facility]  
   
                                        10-          pneumococcal conjuga  
te   
vaccine, 13 valent                                  Cesario Piedra  
Work Phone:   
[phone]                          [facility]  
  
  
  
Payers  
  
  
                          Date         Payer Category Payer        Policy ID  
   
                          2024   Medicare                  [id number]   
[id number]  
   
                          2023   Self-pay                  [id number]  
[id number]  
   
                          2022   Medicare                  [id number]  
[id number]  
[id number]  
   
                          1960   Medicare                  [id number] 2.  
[id number]  
   
                          1959   Unknown                   [id number] 2.16.84  
[id number]  
   
                          1959   Unknown                   [id number] 2.16.84  
[id number]  
   
                          1959   Unknown                   [id number] 2.16.84  
[id number]  
   
                          1959   Unknown                   [id number] 2.16.84  
[id number]  
   
                          1959   Unknown                   [id number] 2.16.84  
[id number]  
   
                          1959   Unknown                   [id number] 2.16.84  
[id number]  
   
                                       Medicaid [employer]   [id number]   
[id number]  
   
                                       Medicare     Medicare     [id number]   
[id number]  
   
                                       Unknown                     
   
                                       Unknown      HCAP/HFA/FAP Active [id number]  
[id number]   
[id number]  
   
                                       Unknown                   [id number] 2.16.8  
[id number]  
   
                                       Unknown                   [id number] 2.16.8  
[id number]  
   
                                       Unknown                   [id number] 2.16.8  
[id number]  
   
                                       Unknown                   [id number] 2.16.8  
[id number]  
  
  
  
Social History  
  
  
                          Date         Type         Detail       Facility  
   
                                                    Start: 2023  
End: 2024                         Daily caffeine   
consumption                             Daily caffeine   
consumption                             [facility]   
[employer] [address] DO  
Work Phone:   
[phone]  
   
                                        Comment on above:   2 CUPS OF COFFEE IN   
AM;   
   
                                                    Start: 08-  
End: 2024     Sex Assigned At Birth                     [facility]   
Professional   
Corporation  
Other Phone:   
[phone]  
   
                                                    Start: 2022  
End: 2024                         Tobacco smoking status   
[facility]                      Smoker (finding)          [facility]  
   
                          Start: 1960 Sex Assigned At Birth Female       F  
[facility]  
   
                                                    Start: 2023  
End: 05-                         Tobacco smoking status   
NHIS                      Smokes tobacco daily      [facility]  
   
                                       History of tobacco use Cigarette Smoker P  
[facility]  
   
                                        Start: 2023   Tobacco use and   
exposure                                Smokeless tobacco   
non-user                                [facility]  
   
                                                    Start: 2023  
End: 2024           Alcohol intake            Lifetime non-drinker   
(finding)                               [facility]  
   
                                                            How often to you hav  
e   
a drink containing   
alcohol?                  Never                     [facility]  
   
                                                            Average Number of   
Drinks                    Not on file               [facility]  
   
                          Start: 1960 Sex Assigned At Birth Not on file  P  
[facility]  
   
                                       History of tobacco use Passive smoker [facility]  
   
                                Start: 2024 Gender identity Identifies as   
female   
gender (finding)                        [facility]  
  
  
  
Medical Equipment  
  
  
                                Procedure Code  Equipment Code  Equipment Origin  
al   
Text                      Equipment Identifier      Dates  
   
                                                                CL STENT ALEX   
3.0   
X 12                      FDA                       Start:   
2019  
   
                                                                CL STENT ALEX   
3.0   
X 12                      FDA                       Start:   
2019  
   
                                                                Recorder Crd Anastasiya  
q Ii   
Ins - [id number] -   
[id number]                [id number]                Start:   
2022  
   
                                                                CL STENT ALEX   
3.0   
X 12                      FDA                       Start:   
2019  
   
                                                                CL STENT ALEX   
3.0   
X 12                      FDA                       Start:   
2019  
   
                                                                CL STENT ALEX   
3.0   
X 12                      FDA                       Start:   
2019  
   
                                                                CL STENT ALEX   
3.0   
X 12                      FDA                       Start:   
2019  
   
                                                                CL STENT ALEX   
3.0   
X 12                      FDA                       Start:   
2019  
   
                                                                CL STENT ALEX   
3.0   
X 12                      FDA                       Start:   
2019  
  
  
  
Goals  
  
  
                                Date            Patient Goal    Desired Activity  
/State  
   
                                                                  
  
  
  
Clinical Notes 2022 to 2024  
  
  
                                Note Date & Type Note            Facility  
  
  
  
                                                    2024 Evaluation note   
   
                                           
   
                                        Authored            2024 3:41  
pm  
   
                                                    Patient is positive for occa  
sional dyspepsia,   
intermittent dysphagia, patient has been maintaining  
her weight at this point.   
  
  
  
[facility]  
Work Phone: [phone]- Instructions* Patient Instructions*   
  
Kira Champagne - 2024 4:02 PM EDT  
  
Formatting of this note might be different from the original.  
RTC PRN  
Electronically signed by Kira Champagne at 2024 4:02 PM EDT  
  
  
  
documented in [facility]- History of Present 
illness Narrative* Saturnino Butterfield MD - 2024 3:45 PM EDTFormatting of 
  this note is different from the original.  
Images from the original note were not included.  
NAME: Giacomo Berman  
[facility] NO.: [id number]  
DATE OF SERVICE: 2024 (Rodney)  
  
Some elements in this clinic note that are critical to medical decision making 
have been carefully reviewed and included from a prior clinic note dated: 2024 (Rodney)  
  
Referring Provider: Self Referred  
  
Additional Clinicians involved in Giacomo Berman's care:  
  
DIAGNOSIS:  
History of NHL 2004  
Abnormal weight loss.  
  
ASSESSMENT: 63 year old woman with a history of NHL diagnosed after a liver 
biopsy.  
Currently is concerned with abnormal weight loss and possible recurrence of 
disease.  
  
PET/CT was negative. No apparent malignant cause of symptoms were able to be 
identified at this time.  
  
PLAN:  
RTC PRN  
  
 
________________________________________________________________________________  
_______  
HPI:  
CASE HISTORY: Reverse Chronological Order  
2024 - PET/CT:  
CHEST: FDG avid subcentimeter right axillary lymph node, probably reactive from 
radiotracer injection in the right upper extremity. Abnormally dilated pulmonary
 vessels in the lingula, suggestive of pulmonary arteriovenous malformation.  
HEAD/NECK, ABDOMEN/PELVIS, MUSCULOSKELETAL: No FDG avid neoplastic process.  
Followed with Dr. Jer Goodwin for NHL  
2007 - CT CAP:  
Chest: there are some mild old inflammatory changes, but no adenopathy or mass 
is seen within the chest. No convincing change.  
A/P: Small hepatic abnormalities, smaller than before. No new hepatic 
abnormalities seen. No adenopathy. Bilateral L5 pars defects. Small hiatal 
hernia. No pelvis abnormality identified  
2005 - CT CAP:  
Chest: The previously demonstrated pulmonary nodules are no longer visualized. 
Lungs are now clear.The enlarged mediastinal lymph nodes noted on the prior 
study have markedly decreased in size (largest is 7 x 8 mm) in the precarinal 
region.  
A/P: Marked decrease in size of the liver metastases. Several small hypodense 
lesions persist as described above. There are multiple linear areas of low 
attenuation within the liver probably secondary to fibrotic scars. No 
significant pelvis abnormality.  
2005 - CT CAP:  
Chest: No mediastinal or axillary lymphadenopathy.  
A/P: Multiple low areas of attenuation in the liver have decreased in number and
 size compared to the study of 2004. No abnormal mass or pelvis 
lymphadenopathy.  
2005 - PET/CT:  
Negative study  
2005 - Finished chemotherapy  
2004 - Liver, needle biopsy:  
- Malignant Non-Hodgkin's Lymphoma, diffuse large B cell lymphoma (WHO 
classification)  
2004 - CT A/P:  
Multiple liver masses suspicious for metastatic disease. These were also noted 
on 2004 chest CT and I do not see any obvious change. Small hiatal hernia.
 Questionable small ovarian cysts. This could be further evaluated with 
ultrasound. Pelvis otherwise unremarkable.  
2004 - CT Chest:  
Multiple bilateral upper lobe nodules and mediastinal lymphadenopathy with 
associated evidence of metastatic liver disease and lymphadenopathy noted in the
 region of the celiac trunk origin. The differential diagnosis includes 
lymphoproliferative disorder such as lymphoma as well as metastatic disease 
related to a neoplasm of unknown etiology. The most likely causes would be 
adenocarcinoma from either the breast or GI tract. Melanoma could result in 
these findings.  
  
  
Updated Visit, 2024:  
Giacomo returns today with Fide. Her PET scan shows no evidence of NHL recurrence.
 She has gained 1lbsince her last visit. EGD/colonoscopy next week. She may RTC 
with me PRN.  
  
Initial Visit, 2024:  
Giacomo Berman presents today for a Hematology and Oncology evaluation. She is 
joined by her daughter, Fide. She is a 63 year old female who was diagnosed with
 Non Hodgkin's lymphoma with metastases to the liver and lung in . She 
completed chemotherapy with Dr. Jer Goodwin in 2005 and has had no evidence
 of recurrence.  
  
She now reports unintentional weight loss - 70lbs in 3 months. Denies fever, 
chills, and night sweats. Endorses normal bowel movements. Her appetite has been
 stable, she is supplementing with Ensure drinks. Increasing memory problems 
recently. She has an EGD/Colonoscopy scheduled.  
She is a current smoker - 8-10 cigarettes a day.  
  
She had nausea, vomiting, and diarrhea while taking a magnesium supplement in 
the past.  
She has a history or COPD, CHF, and AFIB. She has not had AFIB since  when 
she got the loop implant. She takes Eliquis for a history of CVA in . She 
mentions a history of delirium when admitted in hospitals.  
  
 
________________________________________________________________________________  
_______  
REVIEW OF SYSTEMS  
Per HPI and otherwise negative by full review of organ systems.  
 
________________________________________________________________________________  
_______  
ECOG PERFORMANCE STATUS: 1  
PHYSICAL EXAMINATION:  
Vitals: /76   Pulse 72   Temp (Src) 97.8 (Temporal)   Resp 18   Wt 116 lb 
9.6 oz (52.9kg)   SpO2 96%  
  
Body surface area is 1.55 meters squared.  
Exam limited to gross visualization where appropriate.  
Gen.: This is an age-appropriate patient in no acute distress.  
Head: Appears atraumatic with no visible lesions.  
Eyes: Pupils equally round and reactive to light, extraocular muscles are 
intact.  
Neck: Supple.  
Respiratory: Appears to be respiring comfortably.  
Neurologic: Nonfocal to gross visualization. Alert and oriented 3.  
Psychiatric: No evidence of inappropriate anxiety or depression.  
Skin: Visible areas of skin without rash, lesions, wounds or petechiae.  
  
 
________________________________________________________________________________  
_______  
ALLERGIES:  
ALLERGIES  
Allergen Reactions  
Prochlorperazine Intolerance  
compazine  
  
MEDICATIONS:  
ALPRAZolam 1 mg dissolvable tablet Take 1 mg by mouth once daily.  
apixaban (ELIQUIS) 5 mg tab(s) Take by mouth two times a day.  
empagliflozin (JARDIANCE) 25 mg tablet Take 25 mg by mouth daily with breakfast.  
furosemide (LASIX) 20 mg tablet Take 20 mg by mouth once daily.  
metoprolol succinate ER (TOPROL XL) 25 mg 24 hr tablet Take 12.5 mg by mouth 
once daily.  
pantoprazole DR (PROTONIX) 40 mg tablet Take 40 mg by mouth once daily.  
potassium chloride (KLOR-CON M10 ORAL) Take 10 mEq by mouth once daily.  
amitriptyline (ELAVIL) 25 mg tablet Take 25 mg by mouth.  
albuterol HFA (PROVENTIL HFA, VENTOLIN HFA) 90 mcg/actuation inhaler Inhale 2 
Puffs as instructed.  
atorvastatin (LIPITOR) 40 mg tablet Take 40 mg by mouth daily at bedtime.  
ASPIRIN 81MG TABLET Take one (1) tablet daily .  
magnesium glycinate 100 mg magnesium capsule Take 1 capsule by mouth once daily.
 (Patient not taking: Reported on 2024)  
  
 
________________________________________________________________________________  
_______  
LABORATORY VALUES:  
WBC (k/uL)  
Date Value  
2024 6.65  
  
RBC (m/uL)  
Date Value  
2024 4.57  
  
Hemoglobin (g/dL)  
Date Value  
2024 12.2  
  
Hematocrit (%)  
Date Value  
2024 38.8  
  
MCV (fL)  
Date Value  
2024 84.9  
  
MCH (pg)  
Date Value  
2024 26.7  
  
MCHC (g/dL)  
Date Value  
2024 31.4  
  
RDW-CV (%)  
Date Value  
2024 14.6  
  
Platelet Count (k/uL)  
Date Value  
2024 355  
  
MPV (fL)  
Date Value  
2024 10.2  
  
Glucose (mg/dL)  
Date Value  
2024 92  
  
BUN (mg/dL)  
Date Value  
2024 6 (L)  
  
Creatinine (mg/dL)  
Date Value  
2024 0.78  
  
Sodium (mmol/L)  
Date Value  
2024 139  
  
Potassium (mmol/L)  
Date Value  
2024 3.7  
  
Chloride (mmol/L)  
Date Value  
2024 103  
  
CO2 (mmol/L)  
Date Value  
2024 24  
  
Protein, Total (g/dL)  
Date Value  
2024 8.0  
  
Albumin (g/dL)  
Date Value  
2024 3.8 (L)  
  
Calcium, Total (mg/dL)  
Date Value  
2024 10.0  
  
Alkaline Phosphatase (U/L)  
Date Value  
2024 123  
  
Bilirubin, Total (mg/dL)  
Date Value  
2024 0.4  
  
AST (U/L)  
Date Value  
2024 16  
  
ALT (U/L)  
Date Value  
2024 9  
  
Cholesterol, Total (mg/dL)  
Date Value  
2024 142  
  
 
________________________________________________________________________________  
_______  
DIAGNOSIS: (C83.39) Diffuse large B-cell lymphoma of extranodal site excluding 
spleen and other solid organs (HCC) (primary encounter diagnosis)  
  
(R63.4) Abnormal weight loss  
  
  
PAST MEDICAL HISTORY  
Diagnosis Date  
Anemia  
Anxiety state  
Asthma  
Atherosclerosis of native coronary artery of native heart with angina pectoris 
(HCC)  
Atrial fibrillation (HCC)  
Bipolar 2 disorder (HCC)  
Branchial cleft  
CAD (coronary artery disease)  
CHF (congestive heart failure) (HCC)  
COPD (chronic obstructive pulmonary disease) (HCC)  
CVA (cerebral vascular accident) (HCC)  
Essential hypertension  
GERD (gastroesophageal reflux disease)  
History of arteriography  
History of cardioversion  
Hyperlipidemia  
Hypothyroidism  
Irregular heart rate  
Non Hodgkin's lymphoma (HCC)  
Osteoarthritis of multiple joints  
Rheumatoid arthritis (HCC)  
Unintentional weight loss  
  
PAST SURGICAL HISTORY  
Procedure Laterality Date  
SHX CARDIAC RADIOFREQUENCY ABLATION  
  
Social History  
  
Tobacco Use  
Smoking status: Every Day  
Types: Cigarettes  
Passive exposure: Current  
  
FAMILY HISTORY  
Problem Relation Age of Onset  
Hypertension Mother  
Hypothyroidism Mother  
Hypertension Father  
Heart disease Father  
other (Rheumatoid arthritis) Brother  
  
I spent a total of 30 minutes on the date of the service which included 
preparing to see the patient, face-to-face patient care, completing clinical 
documentation, obtaining and/or reviewing separately obtained history, 
performing a medically appropriate examination, counseling and educating the pat
ient/family/caregiver, ordering medications, tests, or procedures, communicating
 with other HCPs (not separately reported), independently interpreting results 
(not separately reported), communicatingresults to the patient/family/caregiver,
 and care coordination (not separately reported).  
  
Saturnino Butterfield MD, CPE  
Hematology and Oncology Services Provided at:  
[place], [place]  
  
Scribe Attestation:  
This note was scribed by Kira Champagne on 2024 under the direction 
and supervision of Dr. Saturnino Butterfield. I attest that all of the information 
documented is correct to the best of my knowledge.  
  
Provider Attestation:  
I, Saturnino Butterfield MD, attest that all information documented by the above 
scribe is correct, and was supervised by me and under my direction.  
  
CC:  
SELF  
  
Cesario Piedra, DO  
[address] [place] [address]  
Electronically signed by Saturnino Butterfield MD at 2024 6:27 PM EDT  
  
documented in this encounterHock[facility]06- NoteHNO ID: [id number]  
Author: SATURNINO BUTTERFIELD MD  
Service: ?  
Author Type: Physician  
Type: Progress Notes  
Filed: 2024 18:27  
Note Text:  
NAME: Giacomo Berman  
[facility] NO.: [id number]  
DATE OF SERVICE: 2024 (Rodney)  
Some elements in this clinic note that are critical to medical decision making  
have been carefully reviewed and included from a prior clinic note dated: 2024 (Rodney)  
Referring Provider: Self Referred  
Additional Clinicians involved in Giacomo L Cullen's care:  
DIAGNOSIS:  
History of NHL 2004  
Abnormal weight loss.  
ASSESSMENT: 63 year old woman with a history of NHL diagnosed after a liver  
biopsy.  
Currently is concerned with abnormal weight loss and possible recurrence of  
disease.  
PET/CT was negative. No apparent malignant cause of symptoms were able to be  
identified at this time.  
PLAN:  
RTC PRN  
________________________________________________________________________________  
_______  
HPI:  
CASE HISTORY: Reverse Chronological Order  
2024 - PET/CT:  
CHEST: FDG avid subcentimeter right axillary lymph node, probably reactive from  
radiotracer injection in the right upper extremity. Abnormally dilated pulmonary  
vessels in the lingula, suggestive of pulmonary arteriovenous malformation.  
HEAD/NECK, ABDOMEN/PELVIS, MUSCULOSKELETAL: No FDG avid neoplastic process.  
Followed with Dr. Jer Goodwin for NHL  
2007 - CT CAP:  
Chest: there are some mild old inflammatory changes, but no adenopathy or mass  
is seen within the chest. No convincing change.  
A/P: Small hepatic abnormalities, smaller than before. No new hepatic  
abnormalities seen. No adenopathy. Bilateral L5 pars defects. Small hiatal  
hernia. No pelvis abnormality identified  
2005 - CT CAP:  
Chest: The previously demonstrated pulmonary nodules are no longer visualized.  
Lungs are now clear. The enlarged mediastinal lymph nodes noted on the prior  
study have markedly decreased in size (largest is 7 x 8 mm) in the precarinal  
region.  
A/P: Marked decrease in size of the liver metastases. Several small hypodense  
lesions persist as described above. There are multiple linear areas of low  
attenuation within the liver probably secondary to fibrotic scars. No  
significant pelvis abnormality.  
2005 - CT CAP:  
Chest: No mediastinal or axillary lymphadenopathy.  
A/P: Multiple low areas of attenuation in the liver have decreased in number and  
size compared to the study of 2004. No abnormal mass or pelvis  
lymphadenopathy.  
2005 - PET/CT:  
Negative study  
2005 - Finished chemotherapy  
2004 - Liver, needle biopsy:  
- Malignant Non-Hodgkin's Lymphoma, diffuse large B cell lymphoma (WHO  
classification)  
2004 - CT A/P:  
Multiple liver masses suspicious for metastatic disease. These were also noted  
on 2004 chest CT and I do not see any obvious change. Small hiatal hernia.  
Questionable small ovarian cysts. This could be further evaluated with  
ultrasound. Pelvis otherwise unremarkable.  
2004 - CT Chest:  
Multiple bilateral upper lobe nodules and mediastinal lymphadenopathy with  
associated evidence of metastatic liver disease and lymphadenopathy noted in the  
region of the celiac trunk origin. The differential diagnosis includes  
lymphoproliferative disorder such as lymphoma as well as metastatic disease  
related to a neoplasm of unknown etiology. The most likely causes would be  
adenocarcinoma from either the breast or GI tract. Melanoma could result in  
these findings.  
Updated Visit, 2024:  
Giacomo returns today with Fide. Her PET scan shows no evidence of NHL recurrence.  
She has gained 1lb since her last visit. EGD/colonoscopy next week. She may RTC  
with me PRN.  
Initial Visit, 2024:  
Giacomo Berman presents today for a Hematology and Oncology evaluation. She is  
joined by her daughter, Fide. She is a 63 year old female who was diagnosed with  
Non Hodgkin's lymphoma with metastases to the liver and lung in . She  
completed chemotherapy with Dr. Jer Goodwin in 2005 and has had no evidence  
of recurrence.  
She now reports unintentional weight loss - 70lbs in 3 months. Denies fever,  
chills, and night sweats. Endorses normal bowel movements. Her appetite has been  
stable, she is supplementing with Ensure drinks. Increasing memory problems  
recently. She has an EGD/Colonoscopy scheduled.  
She is a current smoker - 8-10 cigarettes a day.  
She had nausea, vomiting, and diarrhea while taking a magnesium supplement in  
the past.  
She has a history or COPD, CHF, and AFIB. She has not had AFIB since  when  
she got the loop implant. She takes Eliquis for a history of CVA in . She  
mentions a history of delirium when admitted in hospitals.  
________________________________________________________________________________  
_______  
REVIEW OF SYSTEMS  
Per HPI and otherwise negative by full review of organ systems.  
__________________ (more content not included)...[facility]
2024 NoteHNO ID: [id number]  
Author: ASHA GALVAN RT(R)  
Service: ?  
Author Type: Technologist  
Type: Progress Notes  
Filed: 2024 14:23  
Note Text:  
RADIOLOGY SERVICE PROGRESS NOTE  
SERVICE DATE: 2024  
SERVICE TIME: 2:23 PM  
PATIENT IDENTITY VERIFICATION COMPLETED USING TWO (2) STANDARD IDENTIFIERS:  
Name and Date of Birth confirmed by patient verbally  
POST EXAM PIV STATUS: Discontinued  
PROCEDURE TYPE: NM INJECT: PET/CT BODY SCAN. 6.4 mCi F18 FDG. No other  
medications given..  
ADMINISTRATION TIME: 1342  
PATIENT DISCHARGED TO: Ambulatory patient, left NM department area.  
A Diagnostic radioactive procedure has taken place, with no further precautions  
necessary other than routine body substance precautions. More information  
regarding radiation safety can be found using this link:  
[email or web address]  
[email or web address]%[email or web address]  
SIGNATURE: RT Scarlet(R) PATIENT NAME: Giacomo Berman  
DATE: 2024 MRN: [id number]  
TIME: 2:23 PM PAGER/CONTACT #:[facility]06- NoteHNO ID: 
[id number]  
Author: TAVIA BARBOSA RN  
Service: ?  
Author Type: Registered Nurse  
Type: Progress Notes  
Filed: 2024 13:36  
Note Text:  
Radiology Service Progress Note  
DATE OF SERVICE: 2024  
TIME: 1:35 PM  
PATIENT IDENTITY VERIFICATION COMPLETED USING TWO (2) STANDARD IDENTIFIERS:  
Name and Date of Birth confirmed by patient verbally.  
FALL SCREENING: Has the patient had 2 falls in the last year or 1 fall with  
injury or currently using an Ambulatory Assistive Device (Walker, Cane,  
Wheelchair, Crutches, etc.)? No  
PATIENT GENDER DATA: Female. Pregnancy status: Pregnant: No Breastfeeding  
status: NO.  
EXAM: CT -CONTRAST INDUCED NEPHROPATHY RISK FACTORS: Not applicable  
CREATININE:  
Creatinine  
Date Value Ref Range Status  
2024 0.78 0.58 - 0.96 mg/dL Final  
2019 0.86 0.58 - 0.96 mg/dL Final  
2019 0.81 0.58 - 0.96 mg/dL Final  
Estimated Glomerular Filtration Rate  
Date Value Ref Range Status  
2024 85 >=60 mL/min/1.73m? Final  
Comment:  
Estimated Glomerular Filtration Rate (eGFR) is calculated using the   
CKD-EPI creatinine equation. This equation utilizes serum creatinine, sex, and  
age as parameters. The creatinine assay has traceable calibration to isotope  
dilution-mass spectrometry. Refer to KDIGO guidelines for clinical  
interpretation. In patients with unstable renal function, e.g. those with acute  
kidney injury, the eGFR may not accurately reflect actual GFR.  
eGFR-  
Date Value Ref Range Status  
2019 >60 Final  
P.O.C.T. RESULTS: N/A 2024  
TREATMENT: N/A  
IV SITE: Ambulatory: A peripheral IV was started in the Right hand with a  
Angio cath: 22 gauge.  
IV SITE APPEARANCE: Clean,Dry and Intact  
SIGNATURE: Tavia Barbosa RN PATIENT NAME: Giacomo Berman  
DATE: 2024 MRN: [id number]  
TIME: 1:35 [facility]06- Telephone encounter Note* 
  Telephone Encounter - Brittany Perez RN - 2024 9:13 AM EDTFormatting of
   this note might be different from the original.  
Left message on  that Dr. Cordova agreeable with plan. Informed her to call back 
with further questions. Left phone number to CB.  
  
Brittany Perez RN  
  
Electronically signed by Brittany Perez RN at 2024 9:15 AM EDT  
  
[facility]06- Miscellaneous Notes* Telephone Encounter - Brittany Perez RN - 2024 9:13 AM EDTFormatting of this note might be 
  different from the original.  
Left message on  that Dr. Cordova agreeable with plan. Informed her to call back 
with further questions. Left phone number to CB.  
  
Brittany Perez RN  
  
Electronically signed by Brittany Perez RN at 2024 9:15 AM EDT  
  
* Telephone Encounter - Saturnino Buttrefield MD - 2024 8:31 AM EDTFormatting 
  of this note might be different from the original.  
Agree thanks  
Electronically signed by Saturnino Butterfield MD at 2024 8:32 AM EDT  
  
* Telephone Encounter - Jen Sosa RN - 2024 2:14 PM EDTFormatting 
  of this note might be different from the original.  
pt having colonoscopy/EGD 24. Pt needs to hold Eliquis and Jardiance for 4 
days prior.  
  
Art: Please advise  
  
Jen  
Electronically signed by Jen Sosa RN at 2024 2:16 PM EDT  
  
[facility]
  * Telephone Encounter - Saturnino Butterfield MD - 2024 8:31 AM EDT
  Formatting of this note might be different from the original.  
Agree thanks  
Electronically signed by Saturnino Butterfield MD at 2024 8:32 AM EDT  
  
[facility]- [facility] encounter Note* Telephone Encounter - 
  Jen Sosa RN - 2024 2:14 PM EDTFormatting of this note might be 
  different from the original.  
pt having colonoscopy/EGD 24. Pt needs to hold Eliquis and Jardiance for 4 
days prior.  
  
Art: Please advise  
  
Jen  
Electronically signed by Jen Sosa RN at 2024 2:16 PM EDT  
  
[facility]- Instructions* Patient Instructions*   
  
Kira Champagne - 2024 4:30 PM EDT  
  
Formatting of this note might be different from the original.  
PET/CT when approved  
RTC after to review scan and today's labs  
Electronically signed by Kira Champagne at 2024 4:30 PM EDT  
  
  
  
[facility]- History of Present 
illness Narrative* Saturnino Butterfield MD - 2024 4:00 PM EDTFormatting of 
  this note is different from the original.  
Images from the original note were not included.  
NAME: Giacomo Berman  
CLINIC NO.: [id number]  
DATE OF SERVICE: 2024 (Rodney)  
  
Referring Provider: Self Referred  
  
Consultation requested by Self Referred for an opinion regarding MsEvans Giacomo Berman, and my final recommendations will be communicated back to the requesting 
physician by way of shared medical record or letter via US mail.  
  
Additional Clinicians involved in Giacomo ROSALES Cullen's care:  
  
DIAGNOSIS:  
History of NHL 2004  
Abnormal weight loss.  
  
ASSESSMENT: 63 year old woman with a history of NHL diagnosed after a liver 
biopsy.  
Currently is concerned with abnormal weight loss and possible recurrence of 
disease.  
  
PLAN:  
PET/CT when approved  
RTC after to review scan and today's labs  
  
 
________________________________________________________________________________  
_______  
HPI:  
CASE HISTORY: Reverse Chronological Order  
2007 - CT CAP:  
Chest: there are some mild old inflammatory changes, but no adenopathy or mass 
is seen within the chest. No convincing change.  
A/P: Small hepatic abnormalities, smaller than before. No new hepatic 
abnormalities seen. No adenopathy. Bilateral L5 pars defects. Small hiatal 
hernia. No pelvis abnormality identified  
2005 - CT CAP:  
Chest: The previously demonstrated pulmonary nodules are no longer visualized. 
Lungs are now clear.The enlarged mediastinal lymph nodes noted on the prior 
study have markedly decreased in size (largest is 7 x 8 mm) in the precarinal 
region.  
A/P: Marked decrease in size of the liver metastases. Several small hypodense 
lesions persist as described above. There are multiple linear areas of low 
attenuation within the liver probably secondary to fibrotic scars. No 
significant pelvis abnormality.  
2005 - CT CAP:  
Chest: No mediastinal or axillary lymphadenopathy.  
A/P: Multiple low areas of attenuation in the liver have decreased in number and
 size compared to the study of 2004. No abnormal mass or pelvis 
lymphadenopathy.  
2005 - PET/CT:  
Negative study  
2005 - Finished chemotherapy  
2004 - Liver, needle biopsy:  
- Malignant Non-Hodgkin's Lymphoma, diffuse large B cell lymphoma (WHO 
classification)  
2004 - CT A/P:  
Multiple liver masses suspicious for metastatic disease. These were also noted 
on 2004 chest CT and I do not see any obvious change. Small hiatal hernia.
 Questionable small ovarian cysts. This could be further evaluated with 
ultrasound. Pelvis otherwise unremarkable.  
2004 - CT Chest:  
Multiple bilateral upper lobe nodules and mediastinal lymphadenopathy with 
associated evidence of metastatic liver disease and lymphadenopathy noted in the
 region of the celiac trunk origin. The differential diagnosis includes 
lymphoproliferative disorder such as lymphoma as well as metastatic disease 
related to a neoplasm of unknown etiology. The most likely causes would be 
adenocarcinoma from either the breast or GI tract. Melanoma could result in 
these findings.  
  
  
Initial Visit, 2024:  
Giacomo Berman presents today for a Hematology and Oncology evaluation. She is 
joined by her daughter, Fide. She is a 63 year old female who was diagnosed with
 Non Hodgkin's lymphoma with metastases to the liver and lung in . She 
completed chemotherapy in 2005 and has had no evidence of recurrence.  
  
She now reports unintentional weight loss - 70lbs in 3 months. Denies fever, 
chills, and night sweats. Endorses normal bowel movements. Her appetite has been
 stable, she is supplementing with Ensure drinks. Increasing memory problems 
recently. She has an EGD/Colonoscopy scheduled.  
She is a current smoker - 8-10 cigarettes a day.  
  
She had nausea, vomiting, and diarrhea while taking a magnesium supplement in 
the past.  
She has a history or COPD, CHF, and AFIB. She has not had AFIB since  when 
she got the loop implant. She takes Eliquis for a history of CVA in . She 
mentions a history of delirium when admitted in hospitals.  
  
 
________________________________________________________________________________  
_______  
REVIEW OF SYSTEMS  
Per HPI and otherwise negative by full review of organ systems.  
 
________________________________________________________________________________  
_______  
ECOG PERFORMANCE STATUS: 1  
PHYSICAL EXAMINATION:  
Vitals: /81   Pulse 77   Temp (Src) 97.6 (Temporal)   Resp 16   Wt 115 lb 
11.9 oz (52.5kg)   SpO2 97%  
  
Body surface area is 1.54 meters squared.  
Exam limited to gross visualization where appropriate.  
Gen.: This is an age-appropriate patient in no acute distress.  
Head: Appears atraumatic with no visible lesions.  
Eyes: Pupils equally round and reactive to light, extraocular muscles are 
intact.  
Neck: Supple.  
Respiratory: Appears to be respiring comfortably.  
Neurologic: Nonfocal to gross visualization. Alert and oriented 3.  
Psychiatric: No evidence of inappropriate anxiety or depression.  
Skin: Visible areas of skin without rash, lesions, wounds or petechiae.  
  
 
________________________________________________________________________________  
_______  
ALLERGIES:  
ALLERGIES  
Allergen Reactions  
Prochlorperazine Intolerance  
compazine  
  
MEDICATIONS:  
apixaban (ELIQUIS) 5 mg tab(s) Take by mouth two times a day.  
empagliflozin (JARDIANCE) 25 mg tablet Take 25 mg by mouth daily with breakfast.  
furosemide (LASIX) 20 mg tablet Take 20 mg by mouth once daily.  
metoprolol succinate ER (TOPROL XL) 25 mg 24 hr tablet Take 12.5 mg by mouth 
once daily.  
pantoprazole DR (PROTONIX) 40 mg tablet Take 40 mg by mouth once daily.  
potassium chloride (KLOR-CON M10 ORAL) Take 10 mEq by mouth once daily.  
amitriptyline (ELAVIL) 25 mg tablet Take 25 mg by mouth.  
atorvastatin (LIPITOR) 40 mg tablet Take 40 mg by mouth daily at bedtime.  
ASPIRIN 81MG TABLET Take one (1) tablet daily .  
magnesium glycinate 100 mg magnesium capsule Take 1 capsule by mouth once daily.  
ALPRAZolam 1 mg dissolvable tablet Take 1 mg by mouth once daily.  
albuterol HFA (PROVENTIL HFA, VENTOLIN HFA) 90 mcg/actuation inhaler Inhale 2 
Puffs as instructed.  
  
 
________________________________________________________________________________  
_______  
LABORATORY VALUES:  
WBC (k/uL)  
Date Value  
2024 6.65  
  
RBC (m/uL)  
Date Value  
2024 4.57  
  
Hemoglobin (g/dL)  
Date Value  
2024 12.2  
  
Hematocrit (%)  
Date Value  
2024 38.8  
  
MCV (fL)  
Date Value  
2024 84.9  
  
MCH (pg)  
Date Value  
2024 26.7  
  
MCHC (g/dL)  
Date Value  
2024 31.4  
  
RDW-CV (%)  
Date Value  
2024 14.6  
  
Platelet Count (k/uL)  
Date Value  
2024 355  
  
MPV (fL)  
Date Value  
2024 10.2  
  
Glucose (mg/dL)  
Date Value  
2019 96  
  
BUN (mg/dL)  
Date Value  
2019 11  
  
Creatinine (mg/dL)  
Date Value  
2019 0.86  
  
Sodium (mmol/L)  
Date Value  
2019 142  
  
Potassium (mmol/L)  
Date Value  
2019 3.8  
  
Chloride (mmol/L)  
Date Value  
2019 102  
  
CO2 (mmol/L)  
Date Value  
2019 24  
  
Protein, Total (g/dL)  
Date Value  
2019 8.0  
  
Albumin (g/dL)  
Date Value  
2019 4.3  
  
Calcium (mg/dL)  
Date Value  
2019 10.1  
  
Alkaline Phosphatase (U/L)  
Date Value  
2019 78  
  
Bilirubin, Total (mg/dL)  
Date Value  
2019 0.4  
  
AST (U/L)  
Date Value  
2019 13  
  
ALT (U/L)  
Date Value  
2019 13  
  
 
________________________________________________________________________________  
_______  
DIAGNOSIS: (C83.39) Diffuse large B-cell lymphoma of extranodal site excluding 
spleen and other solid organs (HCC) (primary encounter diagnosis)  
Plan: LACTATE DEHYDROGENASE, COMPLETE BLOOD COUNT AND  
DIFFERENTIAL, COMPREHENSIVE METABOLIC PANEL,  
IRON AND TIBC, FERRITIN, VITAMIN B12, FOLATE,  
SERUM, TOTAL CHOLESTEROL, FACTOR VIII:C ASSAY,  
FACTOR V:C ASSAY, FACTOR II:C ASSAY  
  
(R63.4) Abnormal weight loss  
Plan: LACTATE DEHYDROGENASE, COMPLETE BLOOD COUNT AND  
DIFFERENTIAL, COMPREHENSIVE METABOLIC PANEL,  
IRON AND TIBC, FERRITIN, VITAMIN B12, FOLATE,  
SERUM, TOTAL CHOLESTEROL, FACTOR VIII:C ASSAY,  
FACTOR V:C ASSAY, FACTOR II:C ASSAY,  
PREALBUMIN, PHOSPHORUS INORGANIC, MAGNESIUM  
  
(C83.39) Diffuse large B-cell lymphoma of solid organ excluding spleen (HCC)  
Plan: NM PET/CT SKULL-THIGH INITIAL  
  
  
PAST MEDICAL HISTORY  
Diagnosis Date  
Anemia  
Anxiety state  
Asthma  
Atherosclerosis of native coronary artery of native heart with angina pectoris 
(HCC)  
Atrial fibrillation (HCC)  
Bipolar 2 disorder (HCC)  
Branchial cleft  
CAD (coronary artery disease)  
CHF (congestive heart failure) (HCC)  
COPD (chronic obstructive pulmonary disease) (HCC)  
CVA (cerebral vascular accident) (HCC)  
Essential hypertension  
GERD (gastroesophageal reflux disease)  
History of arteriography  
History of cardioversion  
Hyperlipidemia  
Hypothyroidism  
Irregular heart rate  
Non Hodgkin's lymphoma (HCC)  
Osteoarthritis of multiple joints  
Rheumatoid arthritis (HCC)  
Unintentional weight loss  
  
PAST SURGICAL HISTORY  
Procedure Laterality Date  
SHX CARDIAC RADIOFREQUENCY ABLATION  
  
Social History  
  
Tobacco Use  
Smoking status: Every Day  
Types: Cigarettes  
Passive exposure: Current  
  
FAMILY HISTORY  
Problem Relation Age of Onset  
Hypertension Mother  
Hypothyroidism Mother  
Hypertension Father  
Heart disease Father  
other (Rheumatoid arthritis) Brother  
  
I spent a total of 60 minutes on the date of the service which included 
preparing to see the patient, face-to-face patient care, completing clinical 
documentation, obtaining and/or reviewing separately obtained history, 
performing a medically appropriate examination, counseling and educating the pat
ient/family/caregiver, ordering medications, tests, or procedures, communicating
 with other HCPs (not separately reported), independently interpreting results 
(not separately reported), communicatingresults to the patient/family/caregiver,
 and care coordination (not separately reported).  
  
Saturnino Butterfield MD, CPE  
Hematology and Oncology Services Provided at:  
[place], [place]  
  
Scribe Attestation:  
This note was scribed by Kira Champagne on 2024 under the direction 
and supervision of Dr. Saturnino Butterfield. I attest that all of the information 
documented is correct to the best of my knowledge.  
  
Provider Attestation:  
I, Saturnino Butterfield MD, attest that all information documented by the above 
scribe is correct, and was supervised by me and under my direction.  
  
CC:  
SELF  
  
Cesario Piedra, DO  
[address]  
[place] [address]  
Electronically signed by Saturnino Butterfield MD at 2024 3:06 PM EDT  
  
documented in this encounterH[facility]- NoteHNO ID: [id number]  
Author: SATURNINO BUTTERFIELD MD  
Service: ?  
Author Type: Physician  
Type: Progress Notes  
Filed: 2024 15:06  
Note Text:  
NAME: Giacomo Berman  
[facility] NO.: [id number]  
DATE OF SERVICE: 2024 (Rodney)  
Referring Provider: Self Referred  
Consultation requested by Self Referred for an opinion regarding Ms. Giacomo Berman, and my final recommendations will be communicated back to the requesting  
physician by way of shared medical record or letter via [place] mail.  
Additional Clinicians involved in Giacomo Berman's care:  
DIAGNOSIS:  
History of NHL 2004  
Abnormal weight loss.  
ASSESSMENT: 63 year old woman with a history of NHL diagnosed after a liver  
biopsy.  
Currently is concerned with abnormal weight loss and possible recurrence of  
disease.  
PLAN:  
PET/CT when approved  
RTC after to review scan and today's labs  
________________________________________________________________________________  
_______  
HPI:  
CASE HISTORY: Reverse Chronological Order  
2007 - CT CAP:  
Chest: there are some mild old inflammatory changes, but no adenopathy or mass  
is seen within the chest. No convincing change.  
A/P: Small hepatic abnormalities, smaller than before. No new hepatic  
abnormalities seen. No adenopathy. Bilateral L5 pars defects. Small hiatal  
hernia. No pelvis abnormality identified  
2005 - CT CAP:  
Chest: The previously demonstrated pulmonary nodules are no longer visualized.  
Lungs are now clear. The enlarged mediastinal lymph nodes noted on the prior  
study have markedly decreased in size (largest is 7 x 8 mm) in the precarinal  
region.  
A/P: Marked decrease in size of the liver metastases. Several small hypodense  
lesions persist as described above. There are multiple linear areas of low  
attenuation within the liver probably secondary to fibrotic scars. No  
significant pelvis abnormality.  
2005 - CT CAP:  
Chest: No mediastinal or axillary lymphadenopathy.  
A/P: Multiple low areas of attenuation in the liver have decreased in number and  
size compared to the study of 2004. No abnormal mass or pelvis  
lymphadenopathy.  
2005 - PET/CT:  
Negative study  
2005 - Finished chemotherapy  
2004 - Liver, needle biopsy:  
- Malignant Non-Hodgkin's Lymphoma, diffuse large B cell lymphoma (WHO  
classification)  
2004 - CT A/P:  
Multiple liver masses suspicious for metastatic disease. These were also noted  
on 2004 chest CT and I do not see any obvious change. Small hiatal hernia.  
Questionable small ovarian cysts. This could be further evaluated with  
ultrasound. Pelvis otherwise unremarkable.  
2004 - CT Chest:  
Multiple bilateral upper lobe nodules and mediastinal lymphadenopathy with  
associated evidence of metastatic liver disease and lymphadenopathy noted in the  
region of the celiac trunk origin. The differential diagnosis includes  
lymphoproliferative disorder such as lymphoma as well as metastatic disease  
related to a neoplasm of unknown etiology. The most likely causes would be  
adenocarcinoma from either the breast or GI tract. Melanoma could result in  
these findings.  
Initial Visit, 2024:  
Giacomo Berman presents today for a Hematology and Oncology evaluation. She is  
joined by her daughter, Fide. She is a 63 year old female who was diagnosed with  
Non Hodgkin's lymphoma with metastases to the liver and lung in . She  
completed chemotherapy in 2005 and has had no evidence of recurrence.  
She now reports unintentional weight loss - 70lbs in 3 months. Denies fever,  
chills, and night sweats. Endorses normal bowel movements. Her appetite has been  
stable, she is supplementing with Ensure drinks. Increasing memory problems  
recently. She has an EGD/Colonoscopy scheduled.  
She is a current smoker - 8-10 cigarettes a day.  
She had nausea, vomiting, and diarrhea while taking a magnesium supplement in  
the past.  
She has a history or COPD, CHF, and AFIB. She has not had AFIB since  when  
she got the loop implant. She takes Eliquis for a history of CVA in . She  
mentions a history of delirium when admitted in hospitals.  
________________________________________________________________________________  
_______  
REVIEW OF SYSTEMS  
Per HPI and otherwise negative by full review of organ systems.  
________________________________________________________________________________  
_______  
ECOG PERFORMANCE STATUS: 1  
PHYSICAL EXAMINATION:  
Vitals: /81   Pulse 77   Temp (Src) 97.6 (Temporal)   Resp 16   Wt 115 lb  
11.9 oz (52.5kg)   SpO2 97%  
Body surface area is 1.54 meters squared.  
Exam limited to gross visualization where appropriate.  
Gen.: This is an age-appropriate patient in no acute distress.  
Head: Appears atraumatic with no visible lesions.  
Eyes: Pupils equally round and reactive to light, extraocular muscles are  
intact.  
Neck: Supple.  
Respiratory: Appears to be respiring comfortably.  
Neurologic: Nonfo (more content not included)...[facility]
2024 History of Present illness Narrative* Lissette Jennings MD - 
  2024 1:15 PM ESTFormatting of this note is different from the original.  
Giacomo Berman  
  
Date of visit: 3/4/2024 YOB: 1960  
Age: 63 y.o. MRN: [id number]  
_______________________  
Patient Active Problem List  
Diagnosis  
Hypothyroidism  
Hypertension  
Osteoporosis  
Asthma  
GERD (gastroesophageal reflux disease)  
Hyperlipidemia  
Dysphasia  
Anxiety  
COPD (chronic obstructive pulmonary disease) (CMS-[facility])  
Paroxysmal atrial fibrillation (CMS-[facility])  
Anemia  
Leg edema  
Elevated brain natriuretic peptide (BNP) level  
Rheumatoid arthritis of hand (CMS-[facility])  
Ischemic cardiomyopathy  
Coronary artery disease involving native coronary artery of native heart  
  
_______________________  
Allergies  
Allergen Reactions  
Prochlorperazine Other (See Comments)  
Neurological sx  
compazine  
  
_______________________  
Current Outpatient Medications  
Medication Sig Dispense Refill  
albuterol (PROVENTIL HFA;VENTOLIN HFA) 90 mcg/actuation inhaler Inhale 2 puffs 4
 (four) times a day.  
ALPRAZolam (XANAX) 1 mg tablet Take 1 tablet (1 mg total) by mouth as needed.  
amitriptyline (ELAVIL) 25 mg tablet Take 1 tablet (25 mg total) by mouth 
nightly.  
apixaban (ELIQUIS) 5 mg tablet Take 1 tablet (5 mg total) by mouth in the 
morning and 1 tablet (5 mg total) before bedtime. 60 tablet 11  
aspirin 81 mg chewable tablet Chew 1 tablet (81 mg total) and swallow in the 
morning.  
atorvastatin (LIPITOR) 80 mg tablet Take 1 tablet (80 mg total) by mouth in the 
morning. 90 tablet 3  
furosemide (LASIX) 20 mg tablet Take 1 tablet (20 mg total) by mouth daily. 90 
tablet 2  
JARDIANCE 10 mg tablet tablet TAKE 1 TABLET BY MOUTH EVERY DAY IN THE MORNING 90
 tablet 2  
levothyroxine (SYNTHROID, LEVOTHROID) 88 MCG tablet Take 1 tablet (88 mcg total)
 by mouth in the morning.  
metoprolol succinate XL (TOPROL XL) 25 mg 24 hr tablet TAKE 1 TABLET BY MOUTH 
EVERY MORNING (Patient taking differently: 0.5 tablets (12.5 mg total) in the 
morning.) 90 tablet 3  
pantoprazole (PROTONIX) 40 mg EC tablet Take 1 tablet (40 mg total) by mouth in 
the morning.  
potassium chloride (KLOR-CON) 20 mEq packet Take 1 packet (20 mEq total) by 
mouth in the morning and 1 packet (20 mEq total) before bedtime.  
predniSONE (DELTASONE) 5 mg tablet TAKE 1-2 TABLETS BY MOUTH EVERY DAY AS NEEDED  
sertraline (ZOLOFT) 100 mg tablet Take 1 tablet (100 mg total) by mouth nightly.  
  
No current facility-administered medications for this visit.  
  
_______________________  
Chief Complaint  
Patient presents with  
Follow-up  
6 month  
Hypertension  
Cardiomyopathy  
Atrial Fibrillation  
Shortness of Breath  
Dizziness  
  
_______________________  
History of Present Illness  
Pleasant 63-year-old lady with past medical history of coronary artery disease, 
ischemic cardiomyopathy, heart failure with recovered ejection fraction, 
paroxysmal atrial fibrillation.  
  
Patient is here for routine office visit.  
  
She denies chest pain. She does endorse mild shortness of breath that has not 
worse than usual. Shealso notes excessive weight loss over the last couple years
 more than 70 lb. Patient also mentions that she has lightheadedness.  
_______________________  
Past Medical History:  
Diagnosis Date  
Atrial fibrillation (CMS-HCC)  
COPD (chronic obstructive pulmonary disease) (CMS-HCC)  
Coronary artery disease  
Hypertension  
Stroke (CMS-HCC)  
  
No data recorded  
_______________________  
Past Surgical History:  
Procedure Laterality Date  
ABLATION OF DYSRHYTHMIC FOCUS 2020  
AF/AFL @   
BRONCHOSCOPY RIGID W/ PLACEMENT TRACHEAL / BRONCHIAL STENT   
bronchial cleft repair  
  
_______________________  
Family History  
Problem Relation Age of Onset  
Hypothyroidism Mother  
Hypertension Mother  
Hypertension Father  
Heart disease Father  
Rheum arthritis Sister  
Rheum arthritis Brother  
  
_______________________  
Social History  
  
Socioeconomic History  
Marital status:   
Spouse name: Not on file  
Number of children: Not on file  
Years of education: Not on file  
Highest education level: Not on file  
Occupational History  
Not on file  
Tobacco Use  
Smoking status: Every Day  
Packs/day: .5  
Types: Cigarettes  
Smokeless tobacco: Never  
Vaping Use  
Vaping Use: Former  
Substances: Nicotine  
Devices: Disposable  
Substance and Sexual Activity  
Alcohol use: Never  
Drug use: Never  
Sexual activity: Not on file  
Other Topics Concern  
Caffeine Use Yes  
Social History Narrative  
Not on file  
  
Social Determinants of Health  
  
Financial Resource Strain: Not on file  
Food Insecurity: No Food Insecurity (3/4/2024)  
Hunger Screening  
Food Insecurity - Worry: Never True  
Food Insecurity - Inability: Never True  
Transportation Needs: Not on file  
Physical Activity: Not on file  
Stress: Not on file  
Social Connections: Not on file  
Interpersonal Safety: Not on file  
Housing Instability: Not on file  
  
_______________________  
Review of Systems  
Review of Systems  
Constitutional: Positive for malaise/fatigue.  
HENT: Positive for nosebleeds.  
Eyes: Negative.  
Respiratory: Positive for cough and shortness of breath.  
Hematologic/Lymphatic: Bruises/bleeds easily.  
Skin: Negative.  
Musculoskeletal: Positive for muscle weakness.  
Gastrointestinal: Negative.  
Neurological: Positive for dizziness, light-headedness, loss of balance and 
numbness.  
Psychiatric/Behavioral: Positive for depression. The patient is nervous/anxious.  
Allergic/Immunologic: Negative.  
  
CARDIOVASCULAR: Please review HPI.  
_______________________  
Physical Examination  
General appearance: Alert, oriented and cooperative. In no acute distress.  
Skin: Warm and dry to touch.  
Head: Normocephalic, without obvious abnormality, atraumatic.  
Ears, Nose, Mouth, Throat: Throat clear without erythema or exudate. Dentition 
intact.  
Eyes: Conjunctivae unremarkable, EOM intact.  
Neck: No JVD, No carotid bruit. Neck supple, trachea midline.  
Respiratory: Clear to auscultation bilaterally, no use of accessory muscles.  
Cardiovascular: RRR with normal S1 and S2 with no murmurs.  
Gastrointestinal: Soft, non-tender. Bowel sounds normal.  
Musculoskeletal: No peripheral edema.  
Neurologic: Oriented to time, person and place, affect appropriate. No 
focal/major motor defects noted.  
Psychiatric: Appropriate mood, memory and judgement.  
_______________________  
VITAL SIGNS:  
BP 90/70 (BP Site: Right Arm, BP Postition: Sitting)   Pulse 71   Ht 162.6 cm 
(5' 4 )   Wt 53.5 kg (118 lb)   SpO2 96%   BMI 20.25 kg/m  
_______________________  
Orders Placed or Reconciled This Encounter  
Medications  
potassium chloride (KLOR-CON) 20 mEq packet  
Sig: Take 1 packet (20 mEq total) by mouth in the morning and 1 packet (20 mEq 
total) before bedtime.  
albuterol (PROVENTIL HFA;VENTOLIN HFA) 90 mcg/actuation inhaler  
Sig: Inhale 2 puffs 4 (four) times a day.  
amitriptyline (ELAVIL) 25 mg tablet  
Sig: Take 1 tablet (25 mg total) by mouth nightly.  
sertraline (ZOLOFT) 100 mg tablet  
Sig: Take 1 tablet (100 mg total) by mouth nightly.  
  
Medications Discontinued During This Encounter  
Medication Reason  
folic acid (FOLVITE) 1 mg tablet Therapy completed  
methotrexate 2.5 mg chemo tablet Therapy completed  
nitroglycerin (NITROSTAT) 0.4 MG SL tablet Therapy completed  
ENTRESTO 24-26 mg tablet Stop Taking at Discharge  
  
______________  
_________  
IMPRESSIONS/PLAN  
1. Paroxysmal atrial fibrillation (CMS-HCC)  
- POCT EKG  
  
2. Coronary artery disease involving native coronary artery of native heart, 
unspecified whether angina present  
- POCT EKG  
  
3. Primary hypertension  
  
4. Ischemic cardiomyopathy  
  
5. Hyperlipidemia, unspecified hyperlipidemia type  
  
From a cardiac standpoint, she is stable.  
Blood pressure is in the 90s. She is lightheaded.  
Will discontinue Entresto.  
Continue Toprol XL 12.5 mg p.o. daily. Continue Lasix 20 mg p.o. daily. Toprol 
at some point.  
  
Last LVEF 50-55% echocardiogram .  
  
I asked her to touch base with her PCP regarding her unintentional weight loss. 
She might also be due for colonoscopy. She has remote history of Hodgkin's 
lymphoma. I asked her to drink ensure with every meal well.  
  
All questions and concerns addressed to the patient's satisfaction.  
  
Follow-up in 12 months or sooner if needed.  
  
This note was created with the assistance of a speech recognition program. While
 intending to generate a timely document that accurately reflects the content of
 the visit, no guarantee can be provided that every grammatical or spelling 
mistake has been or will be identified or corrected. Thank you for your 
understanding!  
  
_______________________  
TODAYS ORDERS  
Orders Placed This Encounter  
Procedures  
POCT EKG  
  
_______________________  
FOLLOW UP  
Return in about 1 year (around 3/4/2025).  
  
PCP: Cesario Piedra DO  
Referring Physician: Cesario Piedra DO  
[address] [place] [address]  
  
  
Electronically signed by Lissette Jennings MD at 2024 2:05 PM EST  
  
documented in this encounterUniversity Hospitals [facility]03- Instructions* 
  Patient Instructions*   
  
Brittany Satnam, [facility] - 2024 1:15 PM EST  
  
Formatting of this note is different from the original.  
Are You Ready To Kick The Habit?  
Free Tobacco Cessation Resources  
  
[facility] Services  
[facility] provide all employees with free tobacco 
cessation services that include:  
Counseling to understand nicotine addiction  
Education about medications that can help you successfully quit  
Assistance with developing a plan to quit  
  
Call to set up an individual appointment or find out when group classes will be 
held: [facility]: [phone]  
[facility]: [phone]  
[facility]: [phone]  
[facility]: [phone]  
  
[address] Quit Smoking Action Plan and Resources  
[facility] offers an eight-week, online smoking cessation plan to all
 [facility] employees, regardless of whether [employer] is your medical insurance 
provider.  
  
Go to [email or web address] and click the Health Risk Assessment 
and Resources link to get started. In the [employer] menu, click Action Plans 
instead of Health Risk Assessment to access the Quit Smoking Action Plan.  
  
Additional smoking cessation resources are also available to all [facility] 
employees on the [employer] web page at 
www.[employer]/quitsmoking.  
  
[place] Tobacco Cessation Program  
If [employer] is your medical insurance provider, there are more free resources 
available to you, including:  
No copays or deductibles on local tobacco cessation counseling services to help 
you quit  
Prescription assistance for tobacco cessation medications to help you quit  
For details about the tobacco cessation program available to [employer] members, 
go to www.[employer] (Search: Tobacco Cessation Program).  
  
[place] Tobacco Quit Line  
-QUIT-NOW (1-489.759.1515) is a toll-free, telephonic service that helps 
[place] residents quit smoking and using tobacco. It is staffed by experts who 
tailor a quit plan for you and provide you with advice.  
  
[place] Tobacco Quit Line  
-QUIT-NOW (1-265.607.7471) is a toll-free, telephonic service that helps 
[place] residents quit smoking and using tobacco.  
  
It is staffed by experts who tailor a quit plan for you and provide you with 
advice. Two weeks of nicotine replacement therapy may be provided at no charge, 
if needed.  
  
Additional Resources  
These national organizations also offer free information and resources to help 
you quit tobacco:  
American Cancer Society--www.cancer.org/healthy/stayawayfromtobacco  
American Heart Association--www.heart.org (Search: Quit Smoking)  
Centers for Disease Control and Prevention--www.cdc.gov/tobacco  
American Lung Association--www.lungusa.org  
  
Electronically signed by Brittany Hay CMA at 2024 1:12 PM EST  
  
  
  
documented in this encounterUniversity of Vermont Medical CenterJuventa Technologies Holdings03- Miscellaneous 
Notes* Telephone Encounter - Brittany Hay CMA - 2024 10:06 AM EST
  Formatting of this note might be different from the original.  
Called patient to remind them to bring their most current copy of their 
medication list with them to their appt. Patient verbalizes understanding.  
Electronically signed by Brittany Hay CMA at 2024 10:06 AM EST  
  
documented in this encounterUniversity of Vermont Medical CenterSustainX [facility]30- Telephone 
encounter Note* Telephone Encounter - Brittany Hay CMA - 2024 10:06 
  AM ESTFormatting of this note might be different from the original.  
Called patient to remind them to bring their most current copy of their 
medication list with them to their appt. Patient verbalizes understanding.  
Electronically signed by Brittany Hay CMA at 2024 10:06 AM EST  
  
[employer]- Evaluation note*   
  
                      Encounter Date Diagnosis  Assessment Notes Treatment Notes  
 Treatment   
Clinical Notes  
   
                                                BMI 38.0-38.9,adult   
(ICD-10 - Z68.38)                                             
   
                                                Chronic obstructive   
pulmonary disease,   
unspecified COPD   
type (ICD-10 -   
J44.9)                                                        
   
                                                Rheumatoid arthritis  
   
of hand, unspecified   
laterality,   
unspecified   
rheumatoid factor   
presence (ICD-10 -   
M06.9)                                                        
  
  
[employer]  
Other Phone: [phone]- Evaluation note*   
  
                      Encounter Date Diagnosis  Assessment Notes Treatment Notes  
 Treatment   
Clinical Notes  
   
                                                Essential   
hypertension (ICD-10   
- I10)                                              Due to patient   
being hypotensive   
which is likely   
contributing to a   
lot of her symptoms   
of feeling weak and   
tired she will have   
her metoprolol   
extended release 25   
mg cut in half. She   
is to continue   
monitoring her   
blood pressure and   
let me know if it   
continues to run   
low.  
                                          
   
                                                Acquired   
hypothyroidism   
(ICD-10 - E03.9)                                    We will check   
thyroid to make   
sure she is well   
controlled on   
levothyroxine 88   
mcg.  
                                          
   
                                                GERD   
(gastroesophageal   
reflux disease)   
(ICD-10 - K21.9)                                    Continue with   
Protonix 40 mg   
daily as it is   
working well for   
her reflux.  
                                          
   
                                                Chronic systolic   
congestive heart   
failure (ICD-10 -   
I50.22)                                             Patient is to check   
with cardiology to   
discuss decreasing   
Entresto to also   
help with her   
hypotension.  
                                          
   
                                                Medication   
monitoring encounter   
(ICD-10 - Z51.81)                                             
   
                                                Chronic fatigue   
(ICD-10 - R53.82)                                   We will check labs   
of B12 and vitamin   
D to assess if   
these are low and   
contributing to her   
fatigue.  
                                          
  
  
[facility]  
Other Phone: [phone]- Evaluation note*   
  
                      Encounter Date Diagnosis  Assessment Notes Treatment Notes  
 Treatment   
Clinical Notes  
   
                                        31 May, 2023        Nausea (ICD-10 -   
R11.0)                                              Nausea has resolved   
at this time. She   
hasn't needed the   
Zofran past 2-3   
days from her   
previous visit.  
                                          
   
                                        31 May, 2023        Stomach pain   
(ICD-10 - R10.9)                                    Stomach pain has   
improved with   
decrease the   
potassium. She will   
continue on only   
once daily   
potassium as she is   
having less stomach   
pains and her   
electrolytes are   
normal on labs.  
                                          
   
                                        31 May, 2023        Hypotension due to   
drugs (ICD-10 -   
I95.2)                                              Patient instructed   
to cut the Lasix in   
half to 20 mg   
daily. She is to   
track her BP over   
the next 2 weeks   
and then let me   
know. She is also   
to monitor her   
weight and if it   
goes up quickly she   
is to call. Also   
call if there is   
swelling.  
                                          
   
                                        31 May, 2023        Acute on chronic   
systolic   
congestive heart   
failure (ICD-10 -   
I50.23)                                                       
  
  
[facility]  
Other Phone: [phone]- Evaluation note*   
  
                      Encounter Date Diagnosis  Assessment Notes Treatment Notes  
 Treatment   
Clinical Notes  
   
                                        10 May, 2023        Essential   
hypertension (ICD-10   
- I10)                                                        
   
                                        10 May, 2023        Well adult exam   
(ICD-10 - Z00.00)                                   62-year-old   
female who is   
doing well with   
her chronic   
medical   
conditions   
however she is   
having some   
increased nausea   
and decreased   
appetite as well   
as stomach pain.   
She thinks it   
might be related   
to the potassium   
tablets though   
there is   
concerned that   
she is having a   
stomach ulcer   
again as she has   
had this in the   
past but she has   
no symptoms   
currently of   
melena or   
hematochezia. We   
will decrease   
potassium to once   
daily to see if   
this improves her   
symptoms and   
place her on   
Zofran so we can   
decrease her   
nausea so she can   
eat and drink   
properly. She is   
due for screening   
mammogram and   
this was ordered   
for her. We will   
recheck a   
metabolic panel   
in a couple weeks   
and see her back   
in the office in   
3 weeks.  
                                          
   
                                        10 May, 2023        Acquired   
hypothyroidism   
(ICD-10 - E03.9)                                              
   
                                        10 May, 2023        GERD   
(gastroesophageal   
reflux disease)   
(ICD-10 - K21.9)                                              
   
                                        10 May, 2023        Anxiety (ICD-10 -   
F41.9)                                                        
   
                                        10 May, 2023        Paroxysmal atrial   
fibrillation (ICD-10   
- I48.0)                                                      
   
                                        10 May, 2023        Chronic systolic   
congestive heart   
failure (ICD-10 -   
I50.22)                                                       
   
                                        10 May, 2023        Encounter for   
screening mammogram   
for malignant   
neoplasm of breast   
(ICD-10 - Z12.31)                                             
   
                                        10 May, 2023        Nausea (ICD-10 -   
R11.0)                                                        
   
                                        10 May, 2023        Stomach pain (ICD-10  
   
- R10.9)                                                      
  
  
[facility]  
Other Phone: [phone]- Evaluation note*   
  
                      Encounter Date Diagnosis  Assessment Notes Treatment Notes  
 Treatment   
Clinical Notes  
   
                                        21 Mar, 2023        Acute on chronic   
systolic congestive   
heart failure (ICD-10   
- I50.23)                                                     
   
                                        21 Mar, 2023        Essential   
hypertension (ICD-10   
- I10)                                                        
   
                                        21 Mar, 2023        Acquired   
hypothyroidism   
(ICD-10 - E03.9)                                              
   
                                        21 Mar, 2023        Medication monitorin  
g   
encounter (ICD-10 -   
Z51.81)                                                       
   
                                        21 Mar, 2023        Encounter for   
screening for   
cardiovascular   
disorders (ICD-10 -   
Z13.6)                                                        
  
  
[facility]  
Other Phone: [phone]- Evaluation note*   
  
                      Encounter Date Diagnosis  Assessment Notes Treatment Notes  
 Treatment   
Clinical Notes  
   
                                        28 Dec, 2022        Burning with   
urination (ICD-10   
- R30.0)                                                      
   
                                        28 Dec, 2022        Dysuria (ICD-10 -   
R30.0)                                              62 y.o. female   
presents to the   
office today with   
symptoms of dysuria   
and painful   
urination.   
Urinalysis was   
performed in the   
office today and   
does show positive   
WBC, small amount of   
blood and for this   
urine will be sent   
for culture. Advised   
to start macrobid   
100mg orally BID x 7   
days. Advised   
patient to take   
antibiotic as   
prescribed, take   
with food,  
complete entire   
course of antibiotic   
therapy even if   
feeling better.   
Advised patient that   
the medication   
Jadiance does cause   
excretion of glucose   
in her urine and   
this can increase   
risk of UTIs.   
Advised on good   
hygiene practices.   
Allergies and recent   
antibiotic use were  
reviewed with   
patient. Advised   
patient  
that urine will be   
sent for culture   
today and we will   
call with results in   
2-5 days. Patient   
instructed to   
increase oral   
hydration. Advised   
patient that   
symptoms should   
improve  
in the next 48   
hours. Advised that   
if symptoms persist   
or experiences flank  
pain, fever, chills,   
N/V or with other   
concerns, then to   
call back   
immediately. Patient   
acknowledges   
understanding and   
agrees  
to treatment.  
                                          
  
  
[facility]  
Other Phone: [phone]- Evaluation note*   
  
                      Encounter Date Diagnosis  Assessment Notes Treatment Notes  
 Treatment   
Clinical Notes  
   
                                                Rheumatoid arthritis  
   
of hand, unspecified   
laterality,   
unspecified   
rheumatoid factor   
presence (ICD-10 -   
M06.9)                                              Patient has a   
history of   
rheumatoid and was   
on medication for   
this but went off   
of it a year ago   
and now is having   
a lot of increased   
joint pain. We   
will refer her   
back to   
rheumatology for   
treatment.  
                                          
   
                                                Chronic systolic   
congestive heart   
failure (ICD-10 -   
I50.22)                                             Continue following   
with cardiology we   
will check   
magnesium levels   
due to her   
decreasing her   
magnesium and   
feeling like she   
is having more   
muscle aches.   
Continue with   
Jardiance and   
Entresto.  
                                          
   
                                                Acquired   
hypothyroidism   
(ICD-10 - E03.9)                                    Patient has not   
had her thyroid   
checked in a while   
and given her   
complaints of   
increased fatigue   
it is certainly   
possible her   
thyroid is not   
properly   
corrected. We will   
contact her with   
the results of the   
testing.  
                                          
   
                                                Medication monitorin  
g   
encounter (ICD-10 -   
Z51.81)                                                       
   
                                                Paroxysmal atrial   
fibrillation (ICD-10   
- I48.0)                                            Continue on   
metoprolol and   
Eliquis and follow   
with cardiology   
for this issue.  
                                          
  
  
[facility]  
Other Phone: [phone]- Evaluation note*   
  
                      Encounter Date Diagnosis  Assessment Notes Treatment Notes  
 Treatment   
Clinical Notes  
   
                                        09 May, 2022        Medicare annual   
wellness visit,   
initial (ICD-10 -   
Z00.00)                                             Mrs. Berman seen   
today doing fairly   
well. She states   
disatisfaction with   
current   
cardiologists in   
regards to recent   
admission to   
hospital. Discussed   
with her the need to   
stay on her Lasix   
40mg. She is very   
satisfied with this   
decsion and will   
refer to new   
cardiologist for   
further evaluauton   
and continued care.   
Her BP is being well   
managed on   
Carvedilol 3.125,   
Amiodarone 200mg,   
and Lasix 40mg. At   
this visit she does   
not exhibit any   
signs of BLE edema,   
CHF exacerbation.   
She acknowledges   
treatment plan   
contiuing Lasix and   
to call if with   
increased signs of   
fluid overload. TSH   
level is 1.767 and   
is being well   
managed with   
Levothyroxine 88mcg.  
                                          
   
                                        09 May, 2022        Acute on chronic   
systolic congestive   
heart failure   
(ICD-10 - I50.23)                                   Lasix 40 mg daily  
                                          
   
                                        09 May, 2022        Essential   
hypertension (ICD-10   
- I10)                                              BP is well   
controlled on   
current medications   
and no adjustments   
since the hospital   
stay.  
                                          
   
                                        09 May, 2022        Paroxysmal atrial   
fibrillation (ICD-10   
- I48.0)                                            Heart is regular on   
todays exam.  
                                          
   
                                        09 May, 2022        Acquired   
hypothyroidism   
(ICD-10 - E03.9)                                              
  
  
[facility]  
Other Phone: [phone]- Evaluation note*   
  
                      Encounter Date Diagnosis  Assessment Notes Treatment Notes  
 Treatment   
Clinical Notes  
   
                                        21 Mar, 2022        Acquired   
hypothyroidism   
(ICD-10 - E03.9)                                      
  
                                          
  
  
  
  
[facility]  
Other Phone: [phone] noteNort Coast Professional Corporation  
Other Phone: [phone] noteNo InformationNort Coast Professional 
Corporation  
Other Phone: [phone] noteNo assessment information available
[facility]  
Work Phone: [phone] note*   
  
                          Diagnosis    Onset Date   Resolution   Status  
   
                          Cyclic vomiting syndrome                           acu  
te  
   
                          Hypothyroidism                           acute  
   
                          Weakness                               acute  
   
                          Anxiety                                chronic  
   
                          Medication monitoring encounter                         
    noneactive  
   
                          Dyspnea                                noneactive  
   
                          Weight loss                            noneactive  
  
  
[facility]  
Work Phone: [phone] note*   
  
                                                    Diagnosis  
   
                                                      
  
  
Paroxysmal atrial fibrillation (CMS-HCC)- Primary  
  
  
Atrial fibrillation  
   
                                                      
  
  
Coronary artery disease involving native coronary artery of native heart, 
unspecified   
whether angina present  
   
                                                      
  
  
Primary hypertension  
  
  
Unspecified essential hypertension  
   
                                                      
  
  
Ischemic cardiomyopathy  
  
  
Other specified forms of chronic ischemic heart disease  
   
                                                      
  
  
Hyperlipidemia, unspecified hyperlipidemia type  
  
documented in this encounter  
[facility]*   
  
                          Diagnosis    Onset Date   Resolution   Status  
   
                          [id number]                           acute  
   
                          Anxiety                                chronic  
   
                          ASHD (arteriosclerotic heart disease)                   
          chronic  
   
                          Bipolar II disorder                           chronic  
   
                          Chronic atrial fibrillation                             
chronic  
   
                          Chronic fatigue                           chronic  
   
                          Chronic obstructive pulmonary disease (COPD)            
                 chronic  
   
                          Chronic systolic congestive heart failure               
              chronic  
   
                          Cigarette nicotine dependence without complication      
                       chronic  
   
                          Essential hypertension                           chron  
ic  
   
                          GERD (gastroesophageal reflux disease)                  
           chronic  
   
                          Hyperlipidemia                           chronic  
   
                          Hypokalemia                            chronic  
   
                          Hypothyroidism                           chronic  
   
                          Osteoporosis                           chronic  
   
                          Pharyngeal dysphagia                           chronic  
   
                          Rheumatoid arthritis                           chronic  
   
                          [id number]                           chronic  
   
                          Transaminitis                           chronic  
   
                          Well adult                             noneactive  
  
  
[facility]  
Work Phone: [phone] note*   
  
                                                    Diagnosis  
   
                                                      
  
  
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 
solid   
organs (HCC)- Primary  
   
                                                      
  
  
Abnormal weight loss  
  
  
Loss of weight  
   
                                                      
  
  
Diffuse large B-cell lymphoma of solid organ excluding spleen (HCC)  
   
                                                      
  
  
Bipolar II disorder (HCC)  
  
  
Other bipolar disorders  
  
documented in this encounter  
[facility]   
  
                                                    Diagnosis  
   
                                                      
  
  
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 
solid   
organs (HCC)- Primary  
   
                                                      
  
  
Abnormal weight loss  
  
  
Loss of weight  
  
documented in this encounter  
[facility]   
  
                                        Author              Juve Marte  
[facility]  
   
                                        Authored            2024 3:41  
pm  
   
                                                    Patient is positive for occa  
sional dyspepsia, intermittent dysphagia, patient   
has   
been maintaining  
her weight at this point.   
  
  
  
[facility]  
Work Phone: [phone] general Narrative - [facility]ortedN[facility] 
[facility]  
Other Phone: [phone] general Narrative - Reported*   
  
                                Type            Description     Date  
   
                                Medical History hypothyroidism    
   
                                Medical History HTN               
   
                                Medical History osteoporosis      
   
                                Medical History asthma            
   
                                Medical History GERD              
   
                                Medical History hyperlipdemia     
   
                                Medical History dysphagia         
   
                                Medical History rheumatoid arthritis   
   
                                Medical History anxiety           
   
                                Medical History copd              
   
                                Medical History A-FIB             
   
                                Surgical History BRONCHIAL CLEFT REPAIR   
   
                                Hospitalization History Child birth x2    
   
                                Hospitalization History Sepsis/Rhabdo   2019  
  
  
[facility]  
Other Phone: [phone] general Narrative - Reported*   
  
                                Type            Description     Date  
   
                                Medical History hypothyroidism    
   
                                Medical History HTN               
   
                                Medical History osteoporosis      
   
                                Medical History asthma            
   
                                Medical History GERD              
   
                                Medical History hyperlipdemia     
   
                                Medical History dysphagia         
   
                                Medical History rheumatoid arthritis   
   
                                Medical History anxiety           
   
                                Medical History copd              
   
                                Medical History A-FIB             
   
                                Surgical History BRONCHIAL CLEFT REPAIR   
   
                                Hospitalization History Child birth x2    
   
                                Hospitalization History Sepsis/Rhabdo   2019  
   
                                Hospitalization History Heart failure   3/2022  
  
  
[facility]  
Other Phone: [phone] Discharge instructionsAmbulatory Orders* 
  Disability Placard Time Frame: 24, Location: Determined By Patient  
[facility]  
Work Phone: [phone] on filedocumented in this encounter
[facility] SystemInstructionsNot on filedocumented in this encounter
[facility] SystemReason for referral (narrative)* Diagnostic Procedure 
  Only (Routine) - Authorized  
  
                          Specialty    Diagnoses / Procedures Referred By Contac  
t Referred To Contact  
   
                                                    MOLECULAR & FUNCTIONAL   
IMAGING                                   
  
  
Diagnoses  
  
  
Diffuse large B-cell   
lymphoma of solid organ   
excluding spleen (HCC)  
  
  
  
Procedures  
  
  
NM PET/CT SKULL-THIGH   
INITIAL  
  
  
PET IMAGING CT   
ATTENUATION SKULL BASE   
MID-THIGH                                 
  
  
Saturnino Butterfield MD  
  
  
[address] [place] [address] [place] [address]  
  
  
Phone: [phone]  
  
  
Fax: [phone]                         
  
  
Molecular &   
Functional Imaging  
  
  
[address] [place] [address]  
  
  
Phone: [phone]  
  
  
  
                          Referral ID  Status       Reason       Start   
Date                                    Expiration   
Date                                    Visits   
Requested                               Visits   
Authorized  
   
                                [id number]        Authorized        
  
  
[employer]  
ed Referral     2024        1               1  
  
  
  
  
Electronically signed by Saturnino Butterfield MD at 2024 4:17 PM EDT  
  
  
[facility]  
  
Summary Purpose  
  
  
                                                      
  
  
  
Family History  
                              Unknown Family Member  
  
                                Name            Dates           Details  
   
                                                    No pertinent family history:  
 Mother, Father, Sister,   
Brother(V49.89, Z78.9)  
                                                    Status:Active  
  
                              Unknown Family Member  
  
                                Name            Dates           Details  
   
                                                    No pertinent family history:  
 Mother, Father, Sister,   
Brother(V49.89, Z78.9)  
                                                    Status:Active  
  
                              Unknown Family Member  
  
                                Name            Dates           Details  
   
                                                    No pertinent family history:  
 Mother, Father, Sister,   
Brother(V49.89, Z78.9)  
                                                    Status:Active  
  
                              Unknown Family Member  
  
                                Name            Dates           Details  
   
                                                    No pertinent family history:  
 Mother, Father, Sister,   
Brother(V49.89, Z78.9)  
                                                    Status:Active  
  
                              Unknown Family Member  
  
                                Name            Dates           Details  
   
                                                    No pertinent family history:  
 Mother, Father, Sister,   
Brother(V49.89, Z78.9)  
                                                    Status:Active  
  
                              Unknown Family Member  
  
                                Name            Dates           Details  
   
                                                    No pertinent family history:  
 Mother, Father, Sister,   
Brother(V49.89, Z78.9)  
                                                    Status:Active  
  
                              Unknown Family Member  
  
                                Name            Dates           Details  
   
                                                    No pertinent family history:  
 Mother, Father, Sister,   
Brother(V49.89, Z78.9)  
                                                    Status:Active  
  
                              Unknown Family Member  
  
                                Name            Dates           Details  
   
                                                    No pertinent family history:  
 Mother, Father, Sister,   
Brother(V49.89, Z78.9)  
                                                    Status:Active  
  
                              Unknown Family Member  
  
                                Name            Dates           Details  
   
                                                    No pertinent family history:  
 Mother, Father, Sister,   
Brother(V49.89, Z78.9)  
                                                    Status:Active  
  
                              Unknown Family Member  
  
                                Name            Dates           Details  
   
                                                    No pertinent family history:  
 Mother, Father, Sister,   
Brother(V49.89, Z78.9)  
                                                    Status:Active  
  
                              Unknown Family Member  
  
                                Name            Dates           Details  
   
                                                    No pertinent family history:  
 Mother, Father, Sister,   
Brother(V49.89, Z78.9)  
                                                    Status:Active  
  
                              Unknown Family Member  
  
                                Name            Dates           Details  
   
                                                    No pertinent family history:  
 Mother, Father, Sister,   
Brother(V49.89, Z78.9)  
                                                    Status:Active  
  
  
  
                          Relationship Condition    Age at Onset Recorded Date/T  
maggi  
   
                          brother      Rheumatoid arthritis Unknown        
   
                          daughter     Rheumatoid arthritis Unknown        
   
                          father            Unknown        
   
                                       Hypertension Unknown        
   
                                       Heart disease Unknown        
   
                          Not Specified Hypothyroidism Unknown        
  
  
  
                          Relationship Condition    Age at Onset Recorded Date/T  
maggi  
   
                          brother      Rheumatoid arthritis Unknown        
   
                          daughter     Rheumatoid arthritis Unknown        
   
                          father            Unknown        
   
                                       Hypertension Unknown        
   
                                       Heart disease Unknown        
   
                          mother       Hypothyroidism Unknown        
  
  
  
Advance Directives  
  
  
                                Advance Directive Response        Recorded Date/  
Time  
   
                                Advance Directives Yes              1:10pm  
  
  
  
                                Advance Directive Response        Recorded Date/  
Time  
   
                                Advance Directives Yes              2:10pm  
  
  
  
Procedure Findings  
  
  
                                                    Note  
   
                                                    Pre-procedure Verification a  
nd Time Out: Pre-Procedure Verification and Time   
Out:   
Procedure [facility]procedure [facility] Pre-procedure Verificationcompleted 
TIME   
OUT - Final Verificationcompleted DEBRIEFcompleted General Information:   
Post-Procedure Diagnosis: Atrial Fibrillation, Atypical Atrial Flutter Procedure
   
Name: Atrial Fibrillation and Atypical Atrial Flutter Ablation Findings: See 
Below   
Procedure performed by: Dr. Rich Mullins Assistant(s): Dr. Juan Diego Ty 
Estimated   
Blood Loss (mL): 10 Specimen: no Procedure Details: Procedure Details: The   
indications, risks, benefits, alternatives, and details of the procedure were   
explained to the patient who expressed understanding of the risks including but 
are   
not limited to: pain, bleeding, and infection for which the risk is less than 
1%.   
More serious risks, including cardiac perforation and tamponade, vascular 
trauma,   
pulmonary vein stenosis, atrio-esophageal fistula, diaphragmatic paralysis,   
life-threatening arrhythmia, need for (more content not included)...  
  
  
  
Chief Complaint  
* GIACOMO BERMAN is being seen for a 6 month follow-up of.  
* Patient is in the office for follow-up for the problems noted below. Since her
   last visit she has not had any major breakthrough atrial fibrillation. She is
   on sotalol 60 mg twice daily and Coumadin therapy. She continues to smoke 
  half pack of cigarettes per day something I always discouraged her from doing.
   She has no symptoms suggestive angina pectoris or heart failure. Her weight 
  is still working progress. Her examination is unremarkable for obesity and 
  mild hypertension. EKG confirmed normal sinus rhythm and normal QTc interval.  
* Assessment/recommendations:  
* 1 coronary artery disease single vessel status post plasty to the LAD in 
  2019. She will remain on Plavix along with statin therapy, there has 
  been no recent angina pectoris patient remains at risk for recurrent disease 
  due to active tobacco abuse, this was made clear to her  
* 2 paroxysmal atrial fibrillation status post radiofrequency ablation at 
   with recurrent atrial fibrillation, presently on sotalol 
  60 mg twice daily. ECG confirmed normal sinus rhythm today. Patient remains on
   Coumadin  
* 3 hyperlipidemia on statin therapy , lipid profile is due and was requested  
* 4 obesity encouraged more weight control with diet and exercise  
* 5 hypertension on medical therapy not completely under control, will increase 
  lisinopril up to 5 mgdaily and check her blood pressure readings in few weeks  
* 6 rheumatoid arthritis on methotrexate and prednisone followed by rheumatology  
* 7 high-risk medication with warfarin and sotalol with no complications.  
* 8 tobacco abuse, was counseled again on tobacco cessation  
* GIACOMO BERMAN is being seen for hypertension and BP Check.  
* Patient is in the office for hypertension management since lisinopril 5 mg 
  daily was added her blood pressure has become under control. She does not 
  require diuretic therapy and potassium which I will discontinue.  
Patient is here for EKG ordred by Dr. Tomi Rodgers MD due to SSS diganosis. 
Dr. Lakeshia Vanegas MD in suite. Pt is here due to prolonged QT Interval. 
Medications updated verbally and patient did not bring in list or bottles. 
Patient has no cardiac complaints. EKG reviewed by BENTON Arroyo RN prior to 
discharge. To Dr. Tomi Rodgers MD for review.* GIACOMO BERMAN is being seen for 
  follow-up of a hospitalization for. # week/ [facility] d/c  
* Patient is in the office for follow-up to recent admission to the hospital 
  with pulmonary edema caused by left ventricular systolic and diastolic 
  dysfunction with atrial fibrillation RVR leading to the decompensation. She 
  had cardiac catheterization which revealed patent stent and no coronary diseas
  e otherwise. Ejection fraction 40%. The patient had problem with prolongation 
  of QTc interval whichwas mostly attributed to antiarrhythmic therapy and 
  antipsychotic medications. She left the hospital in sinus rhythm with no 
  antiarrhythmic therapy but has been anticoagulated. EKG subsequently revealed 
  normalization of QTc interval and today's EKG confirmed the same findings. She
   is in sinus rhythm at the present time. Her blood pressure is under control. 
  We initiated therapy with Entresto in the hospital which so far has been 
  tolerated. She currently has no evidence of volume overload.  
* Assessment/recommendations:  
* 1 coronary artery disease single vessel status post plasty to the LAD in 
  2019. Cardiac catheterization 2022 revealed no indication of 
  recurrent disease. Ejection fraction though was down to 40%  
* 2 paroxysmal atrial fibrillation status post radiofrequency ablation at 
  , patient relapsed while she was on sotalol recently 
  leading to pulmonary edema. Due to QTC prolongation andLV systolic dysfunction
   sotalol was discontinued and the patient not tolerate dofetilide or amiodaro
  ne in the hospital because of QTC prolongation. She is currently in sinus 
  rhythm with normal QTc interval since her antipsychotic medication were 
  eliminated and will resume amiodarone at 200 mg dailyand monitor QTc interval.
   We will initiate amiodarone follow-up. ECG in 1 week will be scheduled. We 
  will continue anticoagulation.  
* 3 hyperlipidemia on statin therapy , LDL will be followed closely.  
* 4 obesity encouraged more weight control with diet and exercise  
* 5 hypertension on medical therapy, currently under control we will continue 
  small dose Coreg and Entresto and will titrate if tolerated down the road.  
* 6 rheumatoid arthritis followed by rheumatology  
* 7 high-risk medication with warfarin and amiodarone, will monitor both  
* 8 left ventricular systolic dysfunction, ejection fraction 40% 2022. 
  Currently on medium doseEntresto and small dose of Coreg which down the road 
  could be uptitrated if tolerated and possible introduction of Aldactone.  
* GIACOMO BERMAN is being seen for follow-up of a hospitalization for. # week/ [facility]
   d/c  
* Patient is in the office for follow-up to recent admission to the hospital 
  with pulmonary edema caused by left ventricular systolic and diastolic 
  dysfunction with atrial fibrillation RVR leading to the decompensation. She 
  had cardiac catheterization which revealed patent stent and no coronary diseas
  e otherwise. Ejection fraction 40%. The patient had problem with prolongation 
  of QTc interval whichwas mostly attributed to antiarrhythmic therapy and 
  antipsychotic medications. She left the hospital in sinus rhythm with no 
  antiarrhythmic therapy but has been anticoagulated. EKG subsequently revealed 
  normalization of QTc interval and today's EKG confirmed the same findings. She
   is in sinus rhythm at the present time. Her blood pressure is under control. 
  We initiated therapy with Entresto in the hospital which so far has been 
  tolerated. She currently has no evidence of volume overload.  
* Assessment/recommendations:  
* 1 coronary artery disease single vessel status post plasty to the LAD in 
  2019. Cardiac catheterization 2022 revealed no indication of 
  recurrent disease. Ejection fraction though was down to 40%  
* 2 paroxysmal atrial fibrillation status post radiofrequency ablation at 
  , patient relapsed while she was on sotalol recently 
  leading to pulmonary edema. Due to QTC prolongation andLV systolic dysfunction
   sotalol was discontinued and the patient not tolerate dofetilide or amiodaro
  ne in the hospital because of QTC prolongation. She is currently in sinus 
  rhythm with normal QTc interval since her antipsychotic medication were 
  eliminated and will resume amiodarone at 200 mg dailyand monitor QTc interval.
   We will initiate amiodarone follow-up. ECG in 1 week will be scheduled. We 
  will continue anticoagulation.  
* 3 hyperlipidemia on statin therapy , LDL will be followed closely.  
* 4 obesity encouraged more weight control with diet and exercise  
* 5 hypertension on medical therapy, currently under control we will continue 
  small dose Coreg and Entresto and will titrate if tolerated down the road.  
* 6 rheumatoid arthritis followed by rheumatology  
* 7 high-risk medication with warfarin and amiodarone, will monitor both  
* 8 left ventricular systolic dysfunction, ejection fraction 40% 2022. 
  Currently on medium doseEntresto and small dose of Coreg which down the road 
  could be uptitrated if tolerated and possible introduction of Aldactone.  
* GIACOMO BERMAN is being seen for follow-up of a hospitalization for. # week/ [facility]
   d/c  
* Patient is in the office for follow-up to recent admission to the hospital 
  with pulmonary edema caused by left ventricular systolic and diastolic 
  dysfunction with atrial fibrillation RVR leading to the decompensation. She 
  had cardiac catheterization which revealed patent stent and no coronary diseas
  e otherwise. Ejection fraction 40%. The patient had problem with prolongation 
  of QTc interval whichwas mostly attributed to antiarrhythmic therapy and 
  antipsychotic medications. She left the hospital in sinus rhythm with no 
  antiarrhythmic therapy but has been anticoagulated. EKG subsequently revealed 
  normalization of QTc interval and today's EKG confirmed the same findings. She
   is in sinus rhythm at the present time. Her blood pressure is under control. 
  We initiated therapy with Entresto in the hospital which so far has been 
  tolerated. She currently has no evidence of volume overload.  
* Assessment/recommendations:  
* 1 coronary artery disease single vessel status post plasty to the LAD in 
  2019. Cardiac catheterization 2022 revealed no indication of 
  recurrent disease. Ejection fraction though was down to 40%  
* 2 paroxysmal atrial fibrillation status post radiofrequency ablation at 
  , patient relapsed while she was on sotalol recently 
  leading to pulmonary edema. Due to QTC prolongation andLV systolic dysfunction
   sotalol was discontinued and the patient not tolerate dofetilide or amiodaro
  ne in the hospital because of QTC prolongation. She is currently in sinus 
  rhythm with normal QTc interval since her antipsychotic medication were 
  eliminated and will resume amiodarone at 200 mg dailyand monitor QTc interval.
   We will initiate amiodarone follow-up. ECG in 1 week will be scheduled. We 
  will continue anticoagulation.  
* 3 hyperlipidemia on statin therapy , LDL will be followed closely.  
* 4 obesity encouraged more weight control with diet and exercise  
* 5 hypertension on medical therapy, currently under control we will continue 
  small dose Coreg and Entresto and will titrate if tolerated down the road.  
* 6 rheumatoid arthritis followed by rheumatology  
* 7 high-risk medication with warfarin and amiodarone, will monitor both  
* 8 left ventricular systolic dysfunction, ejection fraction 40% 2022. 
  Currently on medium doseEntresto and small dose of Coreg which down the road 
  could be uptitrated if tolerated and possible introduction of Aldactone.  
  
  
Reason for Referral  
  
  
                                        Reason              Former patient of Dr Evans Joaquin, wants to re-establish  
   
                                        Diagnosis 1         Rheumatoid arthritis  
 of hand, unspecified laterality,   
unspecified rheumatoid factor presence (M06.9)  
   
                                        Referral Organization [facility]  
selvin Gagnon  
   
                                        Referring Provider First Name Cesario  
   
                                        Referring Provider Last Name Dorothea  
   
                                        Referring Provider Specialty Family Prac  
lily  
   
                                        Referred Organization [place] [facility]  
ogjonna  
   
                                        Referred Address    [address] [place],[address]  
   
                                        Referred Provider Specialty Rheumatology  
   
                                        Referral Priority   Routine  
  
  
  
                                        Reason              ** [facility]  
[facility] in [place]  
   
                                        Diagnosis 1         Acute on chronic sys  
tolic congestive heart failure   
(I50.23)  
   
                                        Diagnosis 2         Paroxysmal atrial fi  
brillation (I48.0)  
   
                                        Diagnosis 3         Coronary artery dise  
ase involving native coronary   
artery of native heart without angina pectoris (I25.10)  
   
                                        [facility]  
[facility] [place]  
   
                                        Referring Provider First Name Cesario  
   
                                        Referring Provider Last Name Dorothea  
   
                                        Referring Provider Specialty Family Prac  
lily  
   
                                        Referred Organization [facility]  
   
                                        Referred Address    [address]  
[place], [place],[address]  
   
                                        Referred Provider Specialty Cardiology  
   
                                        Referral Priority   Routine  
   
                                        General Notes       Celine Meyers 05/  
10/2022 08:21:44 AM > referral   
received waiting for notes to be locked  
   
                                        Clinical Notes      P- [phone] X335  
5  
F - [phone]  
  
  
  
Chief Complaint and Reason for Visit  
  
  
                                        Chief Complaint     R30.0  
  
  
  
                                        Chief Complaint       
Z12.31  
  
  
  
                                        Chief Complaint       
Not feeling well  
   
                                        Reason for Visit    Cyclic vomiting synd  
winsome  
Hypothyroidism  
Weakness  
Anxiety  
Medication monitoring encounter  
Dyspnea  
Weight loss  
  
  
  
                                        Chief Complaint     [facility]  
   
                                        Reason for Visit    [id number]  
Anxiety  
ASHD (arteriosclerotic heart disease)  
Bipolar II disorder  
Chronic atrial fibrillation  
Chronic fatigue  
Chronic obstructive pulmonary disease (COPD)  
Chronic systolic congestive heart failure  
Cigarette nicotine dependence without complication  
Essential hypertension  
GERD (gastroesophageal reflux disease)  
Hyperlipidemia  
Hypokalemia  
Hypothyroidism  
Osteoporosis  
Pharyngeal dysphagia  
Rheumatoid arthritis  
[id number]  
Transaminitis  
Well adult  
  
  
  
                                        Chief Complaint      [facility]  
R13.13 e03.9 e78.2 z51.81 e78.2 z51.81  
   
                                        Reason for Visit    [id number]  
Anxiety  
ASHD (arteriosclerotic heart disease)  
Bipolar II disorder  
Chronic atrial fibrillation  
Chronic fatigue  
Chronic obstructive pulmonary disease (COPD)  
Chronic systolic congestive heart failure  
Cigarette nicotine dependence without complication  
Essential hypertension  
GERD (gastroesophageal reflux disease)  
Hyperlipidemia  
Hypokalemia  
Hypothyroidism  
Osteoporosis  
Pharyngeal dysphagia  
Rheumatoid arthritis  
[id number]  
Transaminitis  
Well adult  
  
  
  
                                        Chief Complaint      [facility]  
R13.13 e03.9 e78.2 z51.81 e78.2 z51.81  
BH  
M81.0  
   
                                        Reason for Visit    [id number]  
Anxiety  
ASHD (arteriosclerotic heart disease)  
Bipolar II disorder  
Chronic atrial fibrillation  
Chronic fatigue  
Chronic obstructive pulmonary disease (COPD)  
Chronic systolic congestive heart failure  
Cigarette nicotine dependence without complication  
Essential hypertension  
GERD (gastroesophageal reflux disease)  
Hyperlipidemia  
Hypokalemia  
Hypothyroidism  
Osteoporosis  
Pharyngeal dysphagia  
Rheumatoid arthritis  
[id number]  
Transaminitis  
Well adult  
  
  
  
                                        Chief Complaint      [facility]  
R13.13 e03.9 e78.2 z51.81 e78.2 z51.81  
M81.0  
BH  
Hx of colon polyps, Dysphagia  
Hx of colon polyps, Dysphagia  
Amb Documentation  
Amb Documentation  
discuss Walters's  
   
                                        Reason for Visit    [id number]  
Anxiety  
ASHD (arteriosclerotic heart disease)  
Bipolar II disorder  
Chronic atrial fibrillation  
Chronic fatigue  
Chronic obstructive pulmonary disease (COPD)  
Chronic systolic congestive heart failure  
Cigarette nicotine dependence without complication  
Essential hypertension  
GERD (gastroesophageal reflux disease)  
Hyperlipidemia  
Hypokalemia  
Hypothyroidism  
Osteoporosis  
Pharyngeal dysphagia  
Rheumatoid arthritis  
[id number]  
Transaminitis  
Well adult  
Barretts esophagus  
GERD (gastroesophageal reflux disease)  
  
  
  
                                        Chief Complaint     M81.0  
Hx of colon polyps, Dysphagia  
Hx of colon polyps, Dysphagia  
Amb Documentation  
Amb Documentation  
discuss Walters's  
BH  
3 month follow up  
   
                                        Reason for Visit    Barretts esophagus  
GERD (gastroesophageal reflux disease)  
Barretts esophagus  
Essential hypertension  
GERD (gastroesophageal reflux disease)  
Hypothyroidism  
Rheumatoid arthritis  
Memory impairment  
  
  
  
Additional Source Comments  
  
  
  
                                                    INFORMATION SOURCE (unrecogn  
ized section and content)  
   
                                          
  
  
  
                                        DATE CREATED        AUTHOR  
   
                                2020                      [facility]  
  
  
  
                                DATE CREATED    AUTHOR          AUTHOR'S ORGANIZ  
ATION  
   
                                2022                       [employer]  
  
  
  
                                DATE CREATED    AUTHOR          AUTHOR'S ORGANIZ  
ATION  
   
                                2023                      [facility]  
  
  
  
                                DATE CREATED    AUTHOR          AUTHOR'S ORGANIZ  
ATION  
   
                                2024                      [facility]  
  
  
  
                                DATE CREATED    AUTHOR          AUTHOR'S ORGANIZ  
ATION  
   
                                2024                      [facility]  
[facility]  
  
  
  
                                DATE CREATED    AUTHOR          AUTHOR'S ORGANIZ  
ATION  
   
                                2024                      [facility]  
[facility]  
  
  
  
  
  
                                                    REASON FOR VISIT (unrecogniz  
ed section and content)  
   
                                          
  
  
  
                                Reason          Onset Date      Comments  
   
                                Med Refill      2024        
  
  
  
                                        Reason              Comments  
   
                                        Follow-up           6 month  
   
                                        Hypertension          
   
                                        Cardiomyopathy        
   
                                        Atrial Fibrillation   
   
                                        Shortness of Breath   
   
                                        Dizziness             
  
  
  
                          Specialty    Diagnoses / Procedures Referred By Contac  
t Referred To Contact  
   
                                                    Hematology/Oncology /   
HEMATOLOGY/ONCOLOGY                       
  
  
Diagnoses  
  
  
Non Hodgkin's lymphoma   
(HCC)  
  
  
New Patient  
  
  
Formerly saw Dr. Goodwin  
  
  
remission from Non   
Hodgekins Lymphome Stage   
4  
  
  
Recent unintentional   
Weightloss  
  
  
PCP Dr. Piedra---please   
get records  
  
  
  
Procedures  
  
  
OFFICE/OUTPATIENT NEW SF   
MDM 15 MINUTES  
  
  
OFFICE/OUTPATIENT NEW LOW   
MDM 30 MINUTES  
  
  
OFFICE/OUTPATIENT NEW   
MODERATE MDM 45 MINUTES  
  
  
OFFICE/OUTPATIENT NEW   
HIGH MDM 60 MINUTES  
  
  
NEW PATIENT MED ONC                       
  
  
Self                                      
  
  
Saturnino Butterfield MD  
  
  
[address] [place] [address]  
  
  
Phone: [phone]  
  
  
Fax: [phone]  
  
  
  
                Referral ID Status  Reason  Start Date Expiration Date Visits Re  
quested Visits   
Authorized  
   
                [id number] Closed          2024 1       1  
  
  
  
                                        Reason              Comments  
   
                                        Lymphoma              
  
  
  
                          Specialty    Diagnoses / Procedures Referred By Contac  
t Referred To Contact  
   
                                                    Hematology/Oncology /   
HEMATOLOGY/ONCOLOGY                       
  
  
Diagnoses  
  
  
follow up after PET   
scan to review results  
  
  
  
Procedures  
  
  
OFFICE/OUTPATIENT NEW   
HIGH MDM 60 MINUTES  
  
  
EST PATIENT                               
  
  
Saturnino Butterfield MD  
  
  
[address] [place] [address] [place] [address]  
  
  
Phone: [phone]  
  
  
Fax: [phone]                         
  
  
Saturnino Butterfield MD  
  
  
[address] [place] [address] [place] [address]  
  
  
Phone: [phone]  
  
  
Fax: [phone]  
  
  
  
                Referral ID Status  Reason  Start Date Expiration Date Visits Re  
quested Visits   
Authorized  
   
                [id number] Closed          2024 1       1  
  
  
  
  
  
                                                    Care Teams (unrecognized sec  
tion and content)  
   
                                          
  
  
  
                                                    Team Status: Active   
   
                          Member       Role         Status       Dates  
   
                          Cesario Piedra DO Primary Care Provider Active      
     
  
  
  
                                                    Team Status: Active   
   
                          Member       Role         Status       Dates  
   
                          Cesario Piedra DO Primary Care Provider Active      
   Start: 2024  
  
   
                          Saturnino Butterfield MD Attending Provider Active        
 Start: 2024  
  
  
  
  
                                                    Team Status: Inactive   
   
                          Member       Role         Status       Dates  
   
                                        Cesario Piedra DO Primary Care Provi  
neli, Attending   
Provider                  Active                    Start: 2024  
End: 2024  
  
  
  
                                                    Team Status: Inactive   
   
                          Member       Role         Status       Dates  
   
                          Cesario Piedra DO Primary Care Provider Active      
   Start: 2024  
End: 2024  
   
                          Francisco Baeza MD Attending Provider Active       Start:  
 2024  
End: 2024  
  
  
  
                                                    Team Status: Active   
   
                          Member       Role         Status       Dates  
   
                          Cesario Piedra , DO Primary Care Provider Active      
   Start: 2024  
  
   
                          Francisco Baeza MD Attending Provider, Other Provider Act  
martha       Start: 2024  
  
  
  
  
                                                    Team Status: Active   
   
                          Member       Role         Status       Dates  
   
                          Cesario Piedra , DO Primary Care Provider Active      
   Start: 2024  
  
   
                          Bela Mota Attending Provider Active       Start: 2024  
  
  
  
  
                                                    Team Status: Active   
   
                          Member       Role         Status       Dates  
   
                          Cesario Piedra , DO Primary Care Provider Active      
   Start: 2024  
  
   
                          Kaitlynn Renteria Attending Provider Active       Start:   
2024  
  
  
  
  
                                                    Team Status: Inactive   
   
                          Member       Role         Status       Dates  
   
                          Cesario Piedra , DO Primary Care Provider Active      
   Start: 2024  
End: 2024  
   
                          JESSE Esteban Attending Provider Active       
  Start: 2024  
End: 2024  
  
  
  
                                                    Team Status: Active   
   
                          Member       Role         Status       Dates  
   
                          PHYSICIAN NO FAMILY Primary Care Provider Active        
 Start: 2024  
  
   
                          Redd So MD Attending Provider Active    
     Start: 2024  
  
  
  
  
                                                    Team Status: Inactive   
   
                          Member       Role         Status       Dates  
   
                                        Cesario Piedra , DO Primary Care Provi  
neli,   
Attending Provider        Active                    Start: 2024  
End: 2024  
  
  
  
                                                    Team Status: Active   
   
                          Member       Role         Status       Dates  
   
                          Cesario Piedra , DO Primary Care Provider Active      
     
  
  
  
                                                    Team Status: Inactive   
   
                          Member       Role         Status       Dates  
   
                                        Cesario Piedra , DO Primary Care Provi  
neli, Attending   
Provider                  Active                    Start: May 22nd, 2024  
End: May 22nd, 2024  
  
  
  
                                                    Team Status: Active   
   
                          Member       Role         Status       Dates  
   
                          PHYSICIAN NO FAMILY Primary Care Provider Active        
 Start: May 23rd, 2024  
  
   
                          Redd So MD Attending Provider Active    
     Start: May 23rd, 2024  
  
  
  
  
                                                    Team Status: Inactive   
   
                          Member       Role         Status       Dates  
   
                                        Cesario Piedra , DO Primary Care Provi  
neli, Attending   
Provider                  Active                    Start: May 29th, 2024  
End: May 29th, 2024  
  
  
  
                                                    Team Status: Inactive   
   
                          Member       Role         Status       Dates  
   
                          JESSE Hopkins Attending Provider Active    
       
   
                          PHYSICIAN NO FAMILY Primary Care Provider Active        
   
  
  
  
                                                    Team Status: Inactive   
   
                          Member       Role         Status       Dates  
   
                          PHYSICIAN NO FAMILY Primary Care Provider Active        
   
   
                          Saeed Joaquin MD Attending Provider Active         
  
  
  
                                                    Team Status: Active   
   
                          Member       Role         Status       Dates  
   
                          PHYSICIAN NO FAMILY Primary Care Provider Active        
   
  
  
  
                                                    Team Status: Active   
   
                          Member       Role         Status       Dates  
   
                          PHYSICIAN NO FAMILY Primary Care Provider Active        
   
   
                          Arnav So MD Attending Provider Active     
      
  
  
  
                                                    Team Status: Inactive   
   
                          Member       Role         Status       Dates  
   
                          Cesario Piedra DO Primary Care Provider, Attending  
 Provider Active         
  
  
  
                      Team Member Relationship Specialty  Start Date End Date  
   
                                                      
  
  
Cesario Piedra KATHY   
  
  
NPI: [id number]  
  
  
[facility] [place] [address]  
  
  
[phone] (Work)  
  
  
[phone] (Fax) PCP - General   Family Medicine 20           
  
  
  
                                                    Team Status: Active   
   
                          Member       Role         Status       Dates  
   
                          PHYSICIAN NO FAMILY Primary Care Provider Active        
 Start: 2023  
  
   
                          Redd So MD Attending Provider Active    
     Start: 2023  
  
  
  
  
                                                    Team Status: Inactive   
   
                          Member       Role         Status       Dates  
   
                                        Cesario Piedra DO Primary Care Provi  
neli,   
Attending Provider        Active                    Start: 2024  
End: 2024  
  
  
  
                      Team Member Relationship Specialty  Start Date End Date  
   
                                                      
  
  
Dorothea Cesario CHAVEZ DO  
  
  
NPI: [id number]  
  
  
[address] [place] [address]  
  
  
[phone] (Work)  
  
  
[phone] (Fax) PCP - General   Family Medicine 20           
  
  
  
                      Team Member Relationship Specialty  Start Date End Date  
   
                                                      
  
  
DorotheaCesario bettencourt DO KATHY  
  
  
NPI: [id number]  
  
  
[address] [place] [address]  
  
  
[phone] (Work)  
  
  
[phone] (Fax) PCP - General   Family Medicine 20           
  
  
  
                      Team Member Relationship Specialty  Start Date End Date  
   
                                                      
  
  
Dorothea Cesario Gudino DO  
  
  
NPI: [id number]  
  
  
[phone] (Work)  
  
  
[phone] (Fax) PCP - General   Family Medicine 3/6/19            
  
  
  
                                                    Team Status: Active   
   
                          Member       Role         Status       Dates  
   
                          Cesario Piedra Attending Provider Active       Start:   
2024  
  
   
                          Cesario Piedra DO Primary Care Provider Active      
   Start: 2024  
  
  
  
  
                                                    Team Status: Active   
   
                          Member       Role         Status       Dates  
   
                          PHYSICIAN NO FAMILY Primary Care Provider Active        
 Start: 2024  
  
   
                          Redd So MD Attending Provider Active    
     Start: 2024  
  
  
  
  
                      Team Member Relationship Specialty  Start Date End Date  
   
                                                      
  
  
Cesario Piedra DO  
  
  
NPI: [id number]  
  
  
[phone] (Work)  
  
  
[phone] (Fax) PCP - General   Family Medicine 3/6/19            
  
  
  
                                                    Team Status: Active   
   
                          Member       Role         Status       Dates  
   
                          Cesario Piedra , DO Primary Care Provider Active      
   Start: 2024  
  
   
                          Saturnino Butterfield MD Attending Provider Active        
 Start: 2024  
  
  
  
  
                                                    Team Status: Active   
   
                          Member       Role         Status       Dates  
   
                          PHYSICIAN NO FAMILY Primary Care Provider Active        
 Start: 2024  
  
   
                          Redd So MD Attending Provider Active    
     Start: 2024  
  
  
  
  
                                                    Team Status: Inactive   
   
                          Member       Role         Status       Dates  
   
                                        Cesario Piedra DO Primary Care Provjose quinteros Attending   
Provider                  Active                    Start: 2024  
End: 2024  
  
  
  
                      Team Member Relationship Specialty  Start Date End Date  
   
                                                      
  
  
Cesario Piedra DO  
  
  
NPI: [id number]  
  
  
[phone] (Work)  
  
  
[phone] (Fax) PCP - General   Family Medicine 3/6/19            
  
  
  
                      Team Member Relationship Specialty  Start Date End Date  
   
                                                      
  
  
Cesario Piedra DO  
  
  
NPI: [id number]  
  
  
[phone] (Work)  
  
  
[phone] (Fax) PCP - General   Family Medicine 3/6/19            
  
  
  
                                                    Team Status: Active   
   
                          Member       Role         Status       Dates  
   
                          PHYSICIAN NO FAMILY Primary Care Provider Active        
 Start: 2024  
  
   
                          Redd So MD Attending Provider Active    
     Start: 2024  
  
  
  
  
                                                    Team Status: Inactive   
   
                          Member       Role         Status       Dates  
   
                          Cesario Piedra DO Primary Care Provider Active      
   Start: 2024  
End: 2024  
   
                          Francisco Baeza MD Attending Provider Active       Start:  
 2024  
End: 2024  
  
  
  
                                                    Team Status: Active   
   
                          Member       Role         Status       Dates  
   
                          Cesario Piedra DO Primary Care Provider Active      
   Start: 2024  
  
   
                          Francisco Baeza MD Attending Provider, Other Provider Act  
martha       Start: 2024  
  
  
  
  
                                                    Team Status: Active   
   
                          Member       Role         Status       Dates  
   
                          Cesario Piedra DO Primary Care Provider Active      
   Start: 2024  
  
   
                          Bela Mota Attending Provider Active       Start: 2024  
  
  
  
  
                                                    Team Status: Active   
   
                          Member       Role         Status       Dates  
   
                          Cesario Piedra DO Primary Care Provider Active      
   Start: 2024  
  
   
                          Kaitlynnsusanne Renteria Attending Provider Active       Start:   
2024  
  
  
  
  
                                                    Team Status: Inactive   
   
                          Member       Role         Status       Dates  
   
                          Cesario Piedra , DO Primary Care Provider Active      
   Start: 2024  
End: 2024  
   
                          JESSE Esteban Attending Provider Active       
  Start: 2024  
End: 2024  
  
  
  
                                                    Team Status: Active   
   
                          Member       Role         Status       Dates  
   
                          PHYSICIAN NO FAMILY Primary Care Provider Active        
 Start: 2024  
  
   
                          Redd So MD Attending Provider Active    
     Start: 2024  
  
  
  
  
                                                    Team Status: Inactive   
   
                          Member       Role         Status       Dates  
   
                                        Cesario CHAVEZ Dorothea , DO Primary Care Provi  
neli,   
Attending Provider        Active                    Start: 2024  
End: 2024  
  
  
  
  
  
                                                    Goals (unrecognized section   
and content)  
   
                                                    Goals may be documented in a  
n alternate section  
  
  
  
  
                                                    Source Comments (unrecognize  
d section and content)  
   
                                                    In the event this informatio  
n is protected by the Federal Confidentiality of   
Alcohol   
and Drug Abuse Patient Records regulations: This information has been disclosed 
to   
you from records protected by Federal confidentiality rules (42 CFR Part 2). The
   
Federal rules prohibit you from making any further disclosure of this 
information   
unless further disclosure is expressly permitted by the written consent of the 
person   
to whom it pertains or as otherwise permitted by 42 CFR Part 2. A general   
authorization for the release of medical or other information is NOT sufficient 
for   
this purpose. The Federal rules restrict any use of the information to 
criminally   
investigate or prosecute any alcohol or drug abuse patient.[facility] 
the   
event this information is protected by the Federal Confidentiality of Alcohol 
and   
Drug Abuse Patient Records regulations: This information has been disclosed to 
you   
from records protected by Federal confidentiality rules (42 CFR Part 2). The 
Federal   
rules prohibit you from making any further disclosure of this information unless
   
further disclosure is expressly permitted by the written consent of the person 
to   
whom it pertains or as otherwise permitted by 42 CFR Part 2. A general 
authorization   
for the release of medical or other information is NOT sufficient for this 
purpose.   
The Federal rules restrict any use of the information to criminally investigate 
or   
prosecute any alcohol or drug abuse patient.[facility] the event this   
information is protected by the Federal Confidentiality of Alcohol and Drug 
Abuse   
Patient Records regulations: This information has been disclosed to you from 
records   
protected by Federal confidentiality rules (42 CFR Part 2). The Federal rules   
prohibit you from making any further disclosure of this information unless 
further   
disclosure is expressly permitted by the written consent of the person to whom 
it   
pertains or as otherwise permitted by 42 CFR Part 2. A general authorization for
 the   
release of medical or other information is NOT sufficient for this purpose. The   
Federal rules restrict any use of the information to criminally investigate or   
prosecute any alcohol or drug abuse patient.[facility] the event this   
information is protected by the Federal Confidentiality of Alcohol and Drug 
Abuse   
Patient Records regulations: This information has been disclosed to you from 
records   
protected by Federal confidentiality rules (42 CFR Part 2). The Federal rules   
prohibit you from making any further disclosure of this information unless 
further   
disclosure is expressly permitted by the written consent of the person to whom 
it   
pertains or as otherwise permitted by 42 CFR Part 2. A general authorization for
 the   
release of medical or other information is NOT sufficient for this purpose. The   
Federal rules restrict any use of the information to criminally investigate or   
prosecute any alcohol or drug abuse patient.[facility]  
  
FOR RECORDS PERTAINING TO PATIENTS WHO ARE OR HAVE BEEN ENROLLED IN A CHEMICAL 
DEPENDENCY/SUBSTANCEABUSE PROGRAM, SOME INFORMATION MAY BE OMITTED. This 
clinical summary was aggregated from multiple sources. Caution should be 
exercised in using it in the provision of clinical care. This summary normalizes
 information from multiple sources, and as a consequence, information in this 
document may materially change the coding, format and clinical context of 
patient data. In addition, data may be omitted in some cases. CLINICAL DECISIONS
 SHOULD BE BASED ON THE PRIMARY CLINICAL RECORDS. [facility] [employer] [facility] provides
 no warranty or guarantee of the accuracy or completeness of information in this
 document.

## 2025-07-08 ENCOUNTER — HOSPITAL ENCOUNTER
Dept: HOSPITAL 101 - LAB | Age: 65
Discharge: HOME | End: 2025-07-08
Payer: MEDICARE

## 2025-07-08 DIAGNOSIS — G31.84: Primary | ICD-10-CM

## 2025-07-08 DIAGNOSIS — E87.6: ICD-10-CM

## 2025-07-08 DIAGNOSIS — I25.10: ICD-10-CM

## 2025-07-08 DIAGNOSIS — E03.9: ICD-10-CM

## 2025-07-08 DIAGNOSIS — K22.710: ICD-10-CM

## 2025-07-08 DIAGNOSIS — Z95.5: ICD-10-CM

## 2025-07-08 DIAGNOSIS — I48.20: ICD-10-CM

## 2025-07-08 LAB
ADD MANUAL DIFF: NO
ALANINE AMINOTRANSFERASE: 22 U/L (ref 14–59)
ALBUMIN GLOBULIN RATIO: 0.7
ALBUMIN LEVEL: 3.1 G/DL (ref 3.4–5)
ALKALINE PHOSPHATASE: 133 U/L (ref 46–116)
ANION GAP: 10
ASPARTATE AMINO TRANSFERASE: 71 U/L (ref 15–37)
BASOPHILS # BLD AUTO: 0 10^3/UL (ref 0–0.1)
BASOPHILS NFR BLD AUTO: 0.5 % (ref 0.2–2)
BILIRUBIN TOTAL: 0.7 MG/DL (ref 0.2–1)
BUN SERPL-MCNC: 11 MG/DL (ref 7–18)
BUN/CREAT SERPL: 11.8
CALCIUM: 9.5 MG/DL (ref 8.5–10.1)
CHLORIDE: 97 MMOL/L (ref 98–107)
CHOL HDL RATIO: 2.7
CHOLESTEROL: 143 MG/DL (ref ?–200)
CO2 BLD-SCNC: 35.8 MMOL/L (ref 21–32)
CREAT SERPL-SCNC: 0.93 MG/DL (ref 0.55–1.02)
EOSINOPHIL # BLD AUTO: 0.2 10^3/UL (ref 0–0.7)
EOSINOPHIL NFR BLD AUTO: 2.1 % (ref 0.9–7)
ERYTHROCYTE [DISTWIDTH] IN BLOOD BY AUTOMATED COUNT: 16 % (ref 11–15)
ESTIMATED GFR (AFRICAN AMERICA: >60
ESTIMATED GFR (NON-AFRICAN AME: >60
GLOBULIN: 4.5 G/DL
GLUCOSE SERPLBLD-MCNC: 90 MG/DL (ref 74–106)
HCT VFR BLD CALC: 39.6 % (ref 36–48)
HDL CHOLESTEROL: 53 MG/DL (ref 40–60)
HEMOGLOBIN: 12.9 G/DL (ref 12–16)
IMMATURE GRANULOCYTES ABS AUTO: 0.02 10^3/UL (ref 0–0.03)
IMMATURE GRANULOCYTES PCT AUTO: 0.3 % (ref 0–0.5)
LDL CHOLESTEROL CALCULATED: 71.4 MG/DL
LYMPHOCYTES  ABSOLUTE AUTO: 2.2 10^3/UL (ref 1.2–3.8)
LYMPHOCYTES PERCENT AUTO: 29.7 % (ref 20.5–60)
MAGNESIUM: 2 MG/DL (ref 1.8–2.4)
MCH RBC QN AUTO: 31 PG (ref 26.7–34)
MCV RBC: 95.2 FL (ref 81–99)
MEAN CORPUSCULAR HGB CONC: 32.6 G/DL (ref 29.9–35.2)
MEAN PLATELET VOLUME: 10.4 FL (ref 9.5–13.5)
MONOCYTES # BLD AUTO: 0.4 10^3/UL (ref 0.3–0.8)
MONOCYTES NFR BLD AUTO: 5.9 % (ref 1.7–12)
NEUTROPHILS # BLD AUTO: 4.6 10^3/UL (ref 1.4–6.5)
NEUTROPHILS NFR BLD AUTO: 61.5 % (ref 43–75)
PLATELET # BLD: 346 10^3/UL (ref 150–450)
POTASSIUM SERPLBLD-SCNC: 2.8 MMOL/L (ref 3.5–5.1)
RBC # BLD AUTO: 4.16 10^6/UL (ref 4.2–5.4)
SODIUM BLD-SCNC: 140 MMOL/L (ref 136–145)
TOTAL PROTEIN: 7.6 G/DL (ref 6.4–8.2)
TRIGLYCERIDES: 93 MG/DL (ref ?–150)
TSH W/ REFLEX FT4: 0.59 UIU/ML (ref 0.36–3.74)
VLDL CHOLESTEROL: 18.6 MG/DL
WBC # BLD: 7.5 10^3/UL (ref 4–11)

## 2025-07-08 PROCEDURE — 83735 ASSAY OF MAGNESIUM: CPT

## 2025-07-08 PROCEDURE — 80053 COMPREHEN METABOLIC PANEL: CPT

## 2025-07-08 PROCEDURE — 80061 LIPID PANEL: CPT

## 2025-07-08 PROCEDURE — 36415 COLL VENOUS BLD VENIPUNCTURE: CPT

## 2025-07-08 PROCEDURE — 85025 COMPLETE CBC W/AUTO DIFF WBC: CPT

## 2025-07-08 PROCEDURE — 84443 ASSAY THYROID STIM HORMONE: CPT
